# Patient Record
Sex: FEMALE | Race: WHITE | NOT HISPANIC OR LATINO | Employment: OTHER | URBAN - METROPOLITAN AREA
[De-identification: names, ages, dates, MRNs, and addresses within clinical notes are randomized per-mention and may not be internally consistent; named-entity substitution may affect disease eponyms.]

---

## 2017-01-16 DIAGNOSIS — I10 ESSENTIAL HYPERTENSION: ICD-10-CM

## 2017-01-16 RX ORDER — LOSARTAN POTASSIUM 25 MG/1
25 TABLET ORAL DAILY
Qty: 90 TAB | Refills: 0 | Status: SHIPPED | OUTPATIENT
Start: 2017-01-16 | End: 2017-03-08 | Stop reason: SDUPTHER

## 2017-01-29 ENCOUNTER — PATIENT OUTREACH (OUTPATIENT)
Dept: HEALTH INFORMATION MANAGEMENT | Facility: OTHER | Age: 72
End: 2017-01-29

## 2017-02-06 ENCOUNTER — TELEPHONE (OUTPATIENT)
Dept: HEALTH INFORMATION MANAGEMENT | Facility: OTHER | Age: 72
End: 2017-02-06

## 2017-02-06 DIAGNOSIS — Z12.11 SCREENING FOR COLON CANCER: ICD-10-CM

## 2017-02-06 NOTE — TELEPHONE ENCOUNTER
This patient is overdue for health maintenance topics that need orders so she can complete them.     Please review the pended orders in  to sign or make adjustments as appropriate.    Close the encounter when complete.  If declining these orders, please document a reason and route to the Outreach MA pool (p AMB  Outreach).  I will call the patient to cancel the appointment.

## 2017-02-06 NOTE — PROGRESS NOTES
Outcome:SCHEDULED AWV    Attempt # INCOMMING CALL    Annual Wellness Visit Scheduling  Scheduling Status: Scheduled    Care Gap Scheduling (Attempt to Schedule EACH Overdue Care Gap!)  Health Maintenance Due   Topic Date Due   • Annual Wellness Visit  SCHEDULED   • MAMMOGRAM  SCHEDULED   • COLONOSCOPY  ORDER SENT   • BONE DENSITY  SCHEDULED         MyChart Activation:  Already Active/Declined/Sent Activation Code

## 2017-02-08 ENCOUNTER — HOSPITAL ENCOUNTER (OUTPATIENT)
Dept: RADIOLOGY | Facility: MEDICAL CENTER | Age: 72
End: 2017-02-08
Attending: FAMILY MEDICINE
Payer: MEDICARE

## 2017-02-08 DIAGNOSIS — Z12.31 VISIT FOR SCREENING MAMMOGRAM: ICD-10-CM

## 2017-02-08 PROCEDURE — 77063 BREAST TOMOSYNTHESIS BI: CPT

## 2017-02-16 ENCOUNTER — TELEPHONE (OUTPATIENT)
Dept: MEDICAL GROUP | Age: 72
End: 2017-02-16

## 2017-02-16 DIAGNOSIS — R92.8 ABNORMAL MAMMOGRAM OF LEFT BREAST: Primary | ICD-10-CM

## 2017-02-16 NOTE — TELEPHONE ENCOUNTER
Called patient about mammogram-abnormal left breast finding.  Advice will order diagnostic mammogram and ultrasound for left breast.  Patient to call [phone] to schedule if she does not receive a call within the next couple days.

## 2017-02-20 ENCOUNTER — OFFICE VISIT (OUTPATIENT)
Dept: URGENT CARE | Facility: CLINIC | Age: 72
End: 2017-02-20
Payer: MEDICARE

## 2017-02-20 VITALS
HEIGHT: 64 IN | WEIGHT: 145 LBS | SYSTOLIC BLOOD PRESSURE: 114 MMHG | OXYGEN SATURATION: 97 % | HEART RATE: 84 BPM | DIASTOLIC BLOOD PRESSURE: 66 MMHG | TEMPERATURE: 97.9 F | BODY MASS INDEX: 24.75 KG/M2 | RESPIRATION RATE: 16 BRPM

## 2017-02-20 DIAGNOSIS — J39.2 PHARYNGEAL IRRITATION: ICD-10-CM

## 2017-02-20 PROCEDURE — G8482 FLU IMMUNIZE ORDER/ADMIN: HCPCS | Performed by: NURSE PRACTITIONER

## 2017-02-20 PROCEDURE — 4040F PNEUMOC VAC/ADMIN/RCVD: CPT | Performed by: NURSE PRACTITIONER

## 2017-02-20 PROCEDURE — 1101F PT FALLS ASSESS-DOCD LE1/YR: CPT | Mod: 8P | Performed by: NURSE PRACTITIONER

## 2017-02-20 PROCEDURE — 1036F TOBACCO NON-USER: CPT | Performed by: NURSE PRACTITIONER

## 2017-02-20 PROCEDURE — G8420 CALC BMI NORM PARAMETERS: HCPCS | Performed by: NURSE PRACTITIONER

## 2017-02-20 PROCEDURE — 3017F COLORECTAL CA SCREEN DOC REV: CPT | Mod: 1P | Performed by: NURSE PRACTITIONER

## 2017-02-20 PROCEDURE — 99213 OFFICE O/P EST LOW 20 MIN: CPT | Performed by: NURSE PRACTITIONER

## 2017-02-20 PROCEDURE — 3014F SCREEN MAMMO DOC REV: CPT | Performed by: NURSE PRACTITIONER

## 2017-02-20 PROCEDURE — G8432 DEP SCR NOT DOC, RNG: HCPCS | Performed by: NURSE PRACTITIONER

## 2017-02-20 ASSESSMENT — ENCOUNTER SYMPTOMS
CHILLS: 0
VOMITING: 0
NAUSEA: 0
FEVER: 0
NECK PAIN: 0
MYALGIAS: 0

## 2017-02-20 NOTE — PROGRESS NOTES
Subjective:      Katie Monroy is a 72 y.o. female who presents with Swallowed Foreign Body    Past Medical History   Diagnosis Date   • Arthritis    • Asthma    • Cancer (CMS-HCC)      skin   • Prediabetes    • Hypertension    • Irritable bowel syndrome    • Plantar fasciitis of right foot      Social History     Social History   • Marital Status:      Spouse Name: N/A   • Number of Children: N/A   • Years of Education: N/A     Occupational History   • retired-  , Couple      Social History Main Topics   • Smoking status: Never Smoker    • Smokeless tobacco: Never Used   • Alcohol Use: 0.0 oz/week     0 Standard drinks or equivalent per week      Comment: occasional   • Drug Use: No   • Sexual Activity: Not on file     Other Topics Concern   • Not on file     Social History Narrative    [place]ian.     .     2 daughters.          Family History   Problem Relation Age of Onset   • Cancer Mother      leukemia   • Cancer Father      lung   • Heart Disease Mother      arrhythmia   • Diabetes Neg Hx      Allergies: Review of patient's allergies indicates no known allergies.    This patient is a 72-year-old female who presents today with complaint of foreign body sensation to the right side of her throat. She states yesterday she was at a party and ate some fish. She believes a fishbone became lodged in her throat. She has not had any difficulty swallowing or speaking, and she can tolerate food and fluids. No shortness of breath or difficulty breathing.            Swallowed Foreign Body  This is a new problem. The problem occurs constantly. The problem has been unchanged. Pertinent negatives include no chills, fever, myalgias, nausea, neck pain or vomiting. Nothing aggravates the symptoms. She has tried nothing for the symptoms. The treatment provided no relief.       Review of Systems   Constitutional: Negative for fever and chills.   HENT:        FB sensation in the throat   "  Gastrointestinal: Negative for nausea and vomiting.   Musculoskeletal: Negative for myalgias and neck pain.       All other systems reviewed and are negative      Objective:     /66 mmHg  Pulse 84  Temp(Src) 36.6 °C (97.9 °F)  Resp 16  Ht 1.626 m (5' 4\")  Wt 65.772 kg (145 lb)  BMI 24.88 kg/m2  SpO2 97%     Physical Exam   Constitutional: She is oriented to person, place, and time. She appears well-developed and well-nourished. No distress.   HENT:   Mouth/Throat: Oropharynx is clear and moist. No oropharyngeal exudate.   Oropharynx is clear. There is no obvious FB noted.    Neck: Normal range of motion. Neck supple.   Cardiovascular: Normal rate and regular rhythm.    Neurological: She is alert and oriented to person, place, and time.   Skin: Skin is warm and dry. She is not diaphoretic.   Vitals reviewed.    Advised patient that after lengthy and careful inspection, I do not see an obvious foreign body. Patient still appears to be anxious regarding this. I recommended warm salt water gargles and Chloraseptic spray or lozenges for irritation. I advised her that she may have a scratch on the mucosal surface, and then this may take a day or 2 to heal. I advised her that if her symptoms are persistent, but I do recommend follow-up with ENT. Given strict emergency room precautions for any acute inability to swallow food or fluids, any difficulty speaking or breathing.            Assessment/Plan:   Pharyngeal irritation   -ER precautions as discussed above     -chloraseptic spray or lozenges   -warm saltwater gargles as needed.   There are no diagnoses linked to this encounter.      "

## 2017-02-20 NOTE — MR AVS SNAPSHOT
"        Katie Munozding   2017 9:00 AM   Office Visit   MRN: [id number]    Department:  [facility]   Dept Phone:  [phone]    Description:  Female : 1945   Provider:  Cathey J Hamman, A.P.N.           Reason for Visit     Swallowed Foreign Body possible fish bone, felt it yesterday and this morning can still feel it       Allergies as of 2017     No Known Allergies      You were diagnosed with     Pharyngeal irritation   [244026]         Vital Signs     Blood Pressure Pulse Temperature Respirations Height Weight    114/66 mmHg 84 36.6 °C (97.9 °F) 16 1.626 m (5' 4\") 65.772 kg (145 lb)    Body Mass Index Oxygen Saturation Smoking Status             24.88 kg/m2 97% Never Smoker          Basic Information     Date Of Birth Sex Race Ethnicity Preferred Language    1945 Female White Non- English      Your appointments     Mar 03, 2017  7:45 AM   US BREAST with [address] BREAST [facility] ([address])    [address]  [facility] [id number] [phone]           No deodorant, powder, perfume or lotion under the arm or breast area.            Mar 08, 2017 10:40 AM   ANNUAL WELLNESS with Barbi Guzman M.D., [facility]    [address] [address] [phone]              Problem List              ICD-10-CM Priority Class Noted - Resolved    Asthma, mild persistent J45.30   2016 - Present    Hyperlipidemia E78.5   2016 - Present    Essential hypertension I10   2016 - Present    Skin lesion of face L98.9   2016 - Present    History of skin cancer of unknown type Z80.8   2016 - Present    Arthritis M19.90   2016 - Present    Acute laryngopharyngitis J06.0   2016 - Present      Health Maintenance        Date Due Completion Dates    COLONOSCOPY 2/10/1996 ---    BONE DENSITY 2/10/2011 ---    MAMMOGRAM 2018    IMM DTaP/Tdap/Td Vaccine (2 - " Td) 8/31/2025 8/31/2015            Current Immunizations     13-VALENT PCV PREVNAR 9/3/2015    Influenza Vaccine Adult HD 10/27/2016, 9/14/2015    Pneumococcal polysaccharide vaccine (PPSV-23) 4/14/2011    SHINGLES VACCINE 11/4/2011    Tdap Vaccine 8/31/2015      Below and/or attached are the medications your provider expects you to take. Review all of your home medications and newly ordered medications with your provider and/or pharmacist. Follow medication instructions as directed by your provider and/or pharmacist. Please keep your medication list with you and share with your provider. Update the information when medications are discontinued, doses are changed, or new medications (including over-the-counter products) are added; and carry medication information at all times in the event of emergency situations     Allergies:  No Known Allergies          Medications  Valid as of: February 20, 2017 -  9:32 AM    Generic Name Brand Name Tablet Size Instructions for use    Alum & Mag Hydroxide-Simeth (MAALOX PLUS-BENADRYL-XYLOCAINE) maalox plus-benadryl-xylocaine  Take 5 mL by mouth every 6 hours as needed.        Amoxicillin-Pot Clavulanate (Tab) AUGMENTIN 875-125 MG Take 1 Tab by mouth 2 times a day.        Cholecalciferol (Tab) cholecalciferol 1000 UNIT Take 1,000 Units by mouth every day.        Coenzyme Q10   Take  by mouth.        Cyanocobalamin (Tab) VITAMIN B-12 500 MCG Take 500 mcg by mouth every day.        Losartan Potassium (Tab) COZAAR 25 MG Take 1 Tab by mouth every day.        Multiple Vitamins-Minerals (Tab) THERAGRAN-M  Take 1 Tab by mouth every day.        .                 Medicines prescribed today were sent to:     [facility] [address] [address] [facility]    [address] [facility] [place] [address]    Phone: [phone] Fax: [phone]    Open 24 Hours?: No      Medication refill instructions:       If your prescription bottle indicates you have medication refills left, it is not necessary to  call your provider’s office. Please contact your pharmacy and they will refill your medication.    If your prescription bottle indicates you do not have any refills left, you may request refills at any time through one of the following ways: The online [employer] system (except Urgent Care), by calling your provider’s office, or by asking your pharmacy to contact your provider’s office with a refill request. Medication refills are processed only during regular business hours and may not be available until the next business day. Your provider may request additional information or to have a follow-up visit with you prior to refilling your medication.   *Please Note: Medication refills are assigned a new Rx number when refilled electronically. Your pharmacy may indicate that no refills were authorized even though a new prescription for the same medication is available at the pharmacy. Please request the medicine by name with the pharmacy before contacting your provider for a refill.           [employer] Access Code: Activation code not generated  Current [employer] Status: Active

## 2017-02-22 ENCOUNTER — HOSPITAL ENCOUNTER (OUTPATIENT)
Dept: RADIOLOGY | Facility: MEDICAL CENTER | Age: 72
End: 2017-02-22

## 2017-03-01 ENCOUNTER — TELEPHONE (OUTPATIENT)
Dept: MEDICAL GROUP | Age: 72
End: 2017-03-01

## 2017-03-01 NOTE — TELEPHONE ENCOUNTER
ANNUAL WELLNESS VISIT PRE-VISIT PLANNING     1.  Reviewed last PCP office visit assessment and plan notes: Yes    2.  If any orders were placed last visit do we have Results/Consult Notes?        •  Labs? Yes complete       •  Imaging? Yes complete       •  Referrals? Yes pending for colonoscopy    3.  Patient Care Coordination Note was updated with diagnosis information:  Yes    4.  Patient is due for these Health Maintenance Topics:   Health Maintenance Due   Topic Date Due   • Annual Wellness Visit  1945   • COLONOSCOPY  02/10/1996   • BONE DENSITY  02/10/2011           5.  Immunizations were updated in [facility] using Web[employer]?: Yes       •  Web [employer] Recommendations:  Td, Hep A, Hep B       •  Is patient due for Tdap/Shingles? No.  If yes, was patient alerted of copay? No    6.  Patient has:       •   Diabetes: no       •   COPD: no       •   CHF: no       •   Depression: no    7.  Updated Care Team with [employer] and all specialists?        •   Gait devices, O2, CPAP, etc: N\A        •   Eye professional: yes       •   Other specialists (GYN, cardiology, endo, etc): yes    8.  Is patient in need of any refills prior to office visit? No       •    Separate refill encounter created?: no    9.  Patient was informed to arrive 15 min prior to their scheduled appointment and bring in their medication bottles? yes    10.  Patient was advised: “This is a free wellness visit. The provider will screen for medical conditions to help you stay healthy. If you have other concerns to address you may be asked to discuss these at a separate visit or there may be an additional fee.”  Yes

## 2017-03-03 ENCOUNTER — HOSPITAL ENCOUNTER (OUTPATIENT)
Dept: RADIOLOGY | Facility: MEDICAL CENTER | Age: 72
End: 2017-03-03
Attending: FAMILY MEDICINE
Payer: MEDICARE

## 2017-03-03 ENCOUNTER — TELEPHONE (OUTPATIENT)
Dept: MEDICAL GROUP | Age: 72
End: 2017-03-03

## 2017-03-03 DIAGNOSIS — N63.0 BREAST MASS: ICD-10-CM

## 2017-03-03 DIAGNOSIS — R92.8 ABNORMAL MAMMOGRAM OF LEFT BREAST: ICD-10-CM

## 2017-03-03 PROCEDURE — 19083 BX BREAST 1ST LESION US IMAG: CPT

## 2017-03-03 PROCEDURE — 88305 TISSUE EXAM BY PATHOLOGIST: CPT

## 2017-03-03 PROCEDURE — 76642 ULTRASOUND BREAST LIMITED: CPT | Mod: LT

## 2017-03-03 PROCEDURE — 88360 TUMOR IMMUNOHISTOCHEM/MANUAL: CPT | Mod: 91

## 2017-03-06 ENCOUNTER — TELEPHONE (OUTPATIENT)
Dept: RADIOLOGY | Facility: MEDICAL CENTER | Age: 72
End: 2017-03-06

## 2017-03-06 DIAGNOSIS — C50.412 MALIGNANT NEOPLASM OF UPPER-OUTER QUADRANT OF LEFT FEMALE BREAST (HCC): ICD-10-CM

## 2017-03-06 NOTE — TELEPHONE ENCOUNTER
Spoke to pt let her know that I spoke w/Megan's office and requested that a urgent referral be placed by another provider since Megan isn't in the office till Wed/also let her know RN Miguel Angel will be calling to follow up/chaim

## 2017-03-06 NOTE — TELEPHONE ENCOUNTER
Spoke to Megan's office requested an urgent referral to a breast surgeon be started by another provider since Megan is out of office/chaim

## 2017-03-07 ENCOUNTER — TELEPHONE (OUTPATIENT)
Dept: MEDICAL GROUP | Age: 72
End: 2017-03-07

## 2017-03-07 DIAGNOSIS — C50.912 INVASIVE DUCTAL CARCINOMA OF BREAST, LEFT (HCC): ICD-10-CM

## 2017-03-07 NOTE — TELEPHONE ENCOUNTER
Spoke with patient- she spoke with radiology, received biopsy results already yesterday. She is awaiting call for referral. Appears referral to cancer care coordinator done, but unclear.   New urgent referral done to Dr. Mcleod.   Please call pt when with appt scheduled.

## 2017-03-07 NOTE — TELEPHONE ENCOUNTER
1. Caller Name: Katie Monroy                                         Call Back Number: [phone] (home)       Patient approves a detailed voicemail message: yes    Patient called and was concerned about her results of biopsy of breast. She does have appointment on 3/8/17.   Phone Number Called: [phone] (home)     Message:Spoke with patient and let them know that results were not reviewed by provider. Patient is very upset and would like a call back.    Left Message for patient to call back: yes

## 2017-03-08 ENCOUNTER — OFFICE VISIT (OUTPATIENT)
Dept: MEDICAL GROUP | Age: 72
End: 2017-03-08
Payer: MEDICARE

## 2017-03-08 VITALS
OXYGEN SATURATION: 95 % | BODY MASS INDEX: 26.46 KG/M2 | HEIGHT: 64 IN | TEMPERATURE: 98.2 F | DIASTOLIC BLOOD PRESSURE: 78 MMHG | SYSTOLIC BLOOD PRESSURE: 102 MMHG | WEIGHT: 155 LBS | HEART RATE: 82 BPM

## 2017-03-08 DIAGNOSIS — Z00.00 MEDICARE ANNUAL WELLNESS VISIT, INITIAL: ICD-10-CM

## 2017-03-08 DIAGNOSIS — R10.9 RT FLANK PAIN: ICD-10-CM

## 2017-03-08 DIAGNOSIS — R79.89 ELEVATED TSH: ICD-10-CM

## 2017-03-08 DIAGNOSIS — Z78.0 POSTMENOPAUSAL: ICD-10-CM

## 2017-03-08 DIAGNOSIS — C50.412 MALIGNANT NEOPLASM OF UPPER-OUTER QUADRANT OF LEFT FEMALE BREAST (HCC): ICD-10-CM

## 2017-03-08 DIAGNOSIS — M54.9 UPPER BACK PAIN ON LEFT SIDE: ICD-10-CM

## 2017-03-08 DIAGNOSIS — D05.12 INTRADUCTAL CARCINOMA OF LEFT BREAST: ICD-10-CM

## 2017-03-08 DIAGNOSIS — E78.00 PURE HYPERCHOLESTEROLEMIA: ICD-10-CM

## 2017-03-08 DIAGNOSIS — Z12.11 ENCOUNTER FOR FIT (FECAL IMMUNOCHEMICAL TEST) SCREENING: ICD-10-CM

## 2017-03-08 DIAGNOSIS — Z13.820 SCREENING FOR OSTEOPOROSIS: ICD-10-CM

## 2017-03-08 DIAGNOSIS — I10 ESSENTIAL HYPERTENSION: ICD-10-CM

## 2017-03-08 DIAGNOSIS — M19.90 ARTHRITIS: ICD-10-CM

## 2017-03-08 DIAGNOSIS — J45.20 MILD INTERMITTENT ASTHMA WITHOUT COMPLICATION: ICD-10-CM

## 2017-03-08 DIAGNOSIS — H61.92 LESION OF SKIN OF LEFT EAR: ICD-10-CM

## 2017-03-08 LAB
APPEARANCE UR: CLEAR
BILIRUB UR STRIP-MCNC: NEGATIVE MG/DL
COLOR UR AUTO: NORMAL
GLUCOSE UR STRIP.AUTO-MCNC: NEGATIVE MG/DL
KETONES UR STRIP.AUTO-MCNC: NEGATIVE MG/DL
LEUKOCYTE ESTERASE UR QL STRIP.AUTO: NEGATIVE
NITRITE UR QL STRIP.AUTO: NEGATIVE
PH UR STRIP.AUTO: 5 [PH] (ref 5–8)
PROT UR QL STRIP: NEGATIVE MG/DL
RBC UR QL AUTO: NEGATIVE
SP GR UR STRIP.AUTO: 1.02
UROBILINOGEN UR STRIP-MCNC: NEGATIVE MG/DL

## 2017-03-08 PROCEDURE — G0438 PPPS, INITIAL VISIT: HCPCS | Performed by: FAMILY MEDICINE

## 2017-03-08 PROCEDURE — 81002 URINALYSIS NONAUTO W/O SCOPE: CPT | Performed by: FAMILY MEDICINE

## 2017-03-08 RX ORDER — LOSARTAN POTASSIUM 25 MG/1
25 TABLET ORAL DAILY
Qty: 90 TAB | Refills: 3 | Status: SHIPPED | OUTPATIENT
Start: 2017-03-08 | End: 2018-02-05 | Stop reason: SDUPTHER

## 2017-03-08 ASSESSMENT — PAIN SCALES - GENERAL: PAINLEVEL: 3=SLIGHT PAIN

## 2017-03-08 ASSESSMENT — PATIENT HEALTH QUESTIONNAIRE - PHQ9: CLINICAL INTERPRETATION OF PHQ2 SCORE: 0

## 2017-03-08 NOTE — TELEPHONE ENCOUNTER
Phone Number Called: [phone] (home)     Message: called pt notified that the referral information has been sent to Dr. Rebecca Mcleod, Dr. Mcleod will review information and contact patient based on severity of the case.     Left Message for patient to call back: no

## 2017-03-08 NOTE — MR AVS SNAPSHOT
"Katie Monroy   3/8/2017 10:40 AM   Office Visit   MRN: [id number]    Department:  [facility]   Dept Phone:  [phone]    Description:  Female : 1945   Provider:  Barbi Guzman M.D.; [facility]            Reason for Visit     Annual Wellness Visit           Allergies as of 3/8/2017     No Known Allergies      You were diagnosed with     Medicare annual wellness visit, initial   [078554]       Essential hypertension   [1659515]       Pure hypercholesterolemia   [272.0.ICD-9-CM]       Mild intermittent asthma without complication   [715879]       Arthritis   [109458]       Upper back pain on left side   [711018]       Rt flank pain   [981002]       Elevated TSH   [821317]       Malignant neoplasm of upper-outer quadrant of left female breast (CMS-HCC)   [857984]       Intraductal carcinoma of left breast   [9335695]       Lesion of skin of left ear   [2873774]       Postmenopausal   [573376]       Screening for osteoporosis   [377573]       Encounter for FIT (fecal immunochemical test) screening   [1712378]         Vital Signs     Blood Pressure Pulse Temperature Height Weight Body Mass Index    102/78 mmHg 82 36.8 °C (98.2 °F) 1.623 m (5' 3.9\") 70.308 kg (155 lb) 26.69 kg/m2    Oxygen Saturation Smoking Status                95% Never Smoker           Basic Information     Date Of Birth Sex Race Ethnicity Preferred Language    1945 Female White Non- English      Problem List              ICD-10-CM Priority Class Noted - Resolved    Asthma, mild persistent J45.30   2016 - Present    Hyperlipidemia E78.5   2016 - Present    Essential hypertension I10   2016 - Present    Skin lesion of face L98.9   2016 - Present    History of skin cancer of unknown type Z80.8   2016 - Present    Arthritis M19.90   2016 - Present    Malignant neoplasm of upper-outer quadrant of left female breast (CMS-HCC) C50.412   3/6/2017 - Present    Intraductal carcinoma " of left breast D05.12   3/8/2017 - Present      Health Maintenance        Date Due Completion Dates    COLONOSCOPY 2/10/1996 ---    BONE DENSITY 2/10/2011 ---    MAMMOGRAM 2/22/2018 2/22/2017, 2/22/2017, 2/8/2017    IMM DTaP/Tdap/Td Vaccine (2 - Td) 8/31/2025 8/31/2015            Current Immunizations     13-VALENT PCV PREVNAR 9/3/2015    Influenza Vaccine Adult HD 10/27/2016, 9/14/2015    Pneumococcal polysaccharide vaccine (PPSV-23) 4/14/2011    SHINGLES VACCINE 11/4/2011    Tdap Vaccine 8/31/2015      Below and/or attached are the medications your provider expects you to take. Review all of your home medications and newly ordered medications with your provider and/or pharmacist. Follow medication instructions as directed by your provider and/or pharmacist. Please keep your medication list with you and share with your provider. Update the information when medications are discontinued, doses are changed, or new medications (including over-the-counter products) are added; and carry medication information at all times in the event of emergency situations     Allergies:  No Known Allergies          Medications  Valid as of: March 08, 2017 - 12:54 PM    Generic Name Brand Name Tablet Size Instructions for use    Cholecalciferol (Tab) cholecalciferol 1000 UNIT Take 1,000 Units by mouth every day.        Coenzyme Q10   Take  by mouth.        Cyanocobalamin (Tab) VITAMIN B-12 500 MCG Take 500 mcg by mouth every day.        Losartan Potassium (Tab) COZAAR 25 MG Take 1 Tab by mouth every day.        Multiple Vitamins-Minerals (Tab) THERAGRAN-M  Take 1 Tab by mouth every day.        .                 Medicines prescribed today were sent to:     [facility] [address] [facility] - [address] [facility]    [address] [facility] [place] [address]    Phone: [phone] Fax: [phone]    Open 24 Hours?: No      Medication refill instructions:       If your prescription bottle indicates you have medication refills left, it is not necessary to  call your provider’s office. Please contact your pharmacy and they will refill your medication.    If your prescription bottle indicates you do not have any refills left, you may request refills at any time through one of the following ways: The online [employer] system (except Urgent Care), by calling your provider’s office, or by asking your pharmacy to contact your provider’s office with a refill request. Medication refills are processed only during regular business hours and may not be available until the next business day. Your provider may request additional information or to have a follow-up visit with you prior to refilling your medication.   *Please Note: Medication refills are assigned a new Rx number when refilled electronically. Your pharmacy may indicate that no refills were authorized even though a new prescription for the same medication is available at the pharmacy. Please request the medicine by name with the pharmacy before contacting your provider for a refill.        Your To Do List     Future Labs/Procedures Complete By Expires    CBC WITH DIFFERENTIAL  As directed 3/8/2018    COMP METABOLIC PANEL  As directed 9/5/2017    DS-BONE DENSITY STUDY (DEXA)  As directed 9/7/2017    FREE THYROXINE  As directed 3/8/2018    LIPID PROFILE  As directed 9/5/2017    OCCULT BLOOD FECES IMMUNOASSAY  As directed 3/8/2018    TRIIDOTHYRONINE  As directed 3/8/2018    TSH  As directed 3/8/2018    US-ABDOMEN COMPLETE SURVEY  As directed 3/8/2018      Referral     A referral request has been sent to our patient care coordination department. Please allow 3-5 business days for us to process this request and contact you either by phone or mail. If you do not hear from us by the 5th business day, please call us at [phone].           [employer] Access Code: Activation code not generated  Current [employer] Status: Active

## 2017-03-08 NOTE — PROGRESS NOTES
Chief Complaint   Patient presents with   • Annual Wellness Visit       HPI:  Katie is a 72 y.o. female here for Medicare Annual Wellness Visit  Hypertension- losartan 25 mg daily. Has tolerated medication well.     Hyperlipidemia- not on medication therapy. Fiber and not much red meat.     Asthma-  Inhaler as needed, currently without inhalers. Has dulera- as needed throughout the year.     Arthritis-uses ibuprofen as needed, a few times a week.     She had noticed some pain on left side of her shoulder blade area- vicks vapor rub helped. No further with symptoms currently.     Mild cold symptoms past few days but has been getting better.Sore throat, rhinorrhea better now. Had slight chills. A couple days ago.     Right side kidney area- seems to bother her, when she gets up, does not bother. Last couple months. Has a lipoma on left side back.   6-7 month right side flank pain- would like urine checked. Intermittent. No blood in urine or dysuria.     Elevated TSH.     Recent diagnosis of left side breast cancer- this week. Appointment with Dr. Mcleod on Monday scheduled. She feels the appointment may be too far out.   Hx of hormone therapy for 15 years. Still has occasional hot flashes.     Left ear lesion- noticed recently. Has seen dermatology in past.       Patient Active Problem List    Diagnosis Date Noted   • Intraductal carcinoma of left breast 03/08/2017   • Malignant neoplasm of upper-outer quadrant of left female breast (CMS-HCC) 03/06/2017   • Arthritis 07/08/2016   • Essential hypertension 05/25/2016   • Skin lesion of face 05/25/2016   • History of skin cancer of unknown type 05/25/2016   • Asthma, mild persistent 05/24/2016   • Hyperlipidemia 05/24/2016       Current Outpatient Prescriptions   Medication Sig Dispense Refill   • losartan (COZAAR) 25 MG Tab Take 1 Tab by mouth every day. 90 Tab 3   • Coenzyme Q10 (COQ-10 PO) Take  by mouth.     • therapeutic multivitamin-minerals (THERAGRAN-M) Tab Take 1  Tab by mouth every day.     • cyanocobalamin (VITAMIN B-12) 500 MCG Tab Take 500 mcg by mouth every day.     • vitamin D (CHOLECALCIFEROL) 1000 UNIT Tab Take 1,000 Units by mouth every day.       No current facility-administered medications for this visit.      The patient reports adherence to this regimen   Current supplements as per medication list.   Chronic narcotic pain medicines: no    Allergies: Review of patient's allergies indicates no known allergies.    Current social contact/activities: spending time with      Is patient current with immunizations?  yes       She  reports that she has never smoked. She has never used smokeless tobacco. She reports that she drinks alcohol. She reports that she does not use illicit drugs.  Counseling given: Yes        DPA/Advanced Directive:  Patient does not have an advanced directive.  If not on file, instructed to bring in a copy to scan into her chart. If no advanced directive exists, a packet and workshop information was provided    ROS:    Gait: Uses no assistive device   Ostomy: no   Other tubes: no   Amputations: no   Chronic oxygen use no   Last eye exam 2014   : Denies incontinence.     Depression Screening    Little interest or pleasure in doing things?  0 - not at all  Feeling down, depressed, or hopeless?  0 - not at all  Patient Health Questionnaire Score: 0    If depressive symptoms identified deferred to follow up visit unless specifically addressed in assessment and plan.    Screening for Cognitive Impairment    Three Minute Recall (banana, sunrise, fence)  3/3    Draw clock face with all 12 numbers set to the hand to show 10 minutes past 11 o'clock  1 5/5  Cognitive concerns identified deferred for follow up unless specifically addressed in assessment and plan.    Fall Risk Assessment    Has the patient had two or more falls in the last year or any fall with injury in the last year?  No    Safety Assessment    Throw rugs on floor.  Yes  Handrails  on all stairs.  Yes  Good lighting in all hallways.  Yes  Difficulty hearing.  No  Patient counseled about all safety risks that were identified.    Functional Assessment ADLs    Are there any barriers preventing you from cooking for yourself or meeting nutritional needs?  No.    Are there any barriers preventing you from driving safely or obtaining transportation?  No.    Are there any barriers preventing you from using a telephone or calling for help?  No.    Are there any barriers preventing you from shopping?  No.    Are there any barriers preventing you from taking care of your own finances?  No.    Are there any barriers preventing you from managing your medications?  No.    Are currently engaging any exercise or physical activity?  Yes.  Goes to gym 2-3 times    Health Maintenance Summary                Annual Wellness Visit Overdue 1945     COLONOSCOPY Overdue 2/10/1996     BONE DENSITY Overdue 2/10/2011     MAMMOGRAM Next Due 2/22/2018      Done 2/22/2017 [id number] FILM MAMMOGRAPHY     Patient has more history with this topic...    IMM DTaP/Tdap/Td Vaccine Next Due 8/31/2025      Done 8/31/2015 Imm Admin: Tdap Vaccine          Patient Care Team:  Barbi Guzman M.D. as PCP - General (Family Medicine)    Social History   Substance Use Topics   • Smoking status: Never Smoker    • Smokeless tobacco: Never Used   • Alcohol Use: 0.0 oz/week     0 Standard drinks or equivalent per week      Comment: occasional     Family History   Problem Relation Age of Onset   • Cancer Mother      leukemia   • Cancer Father      lung   • Heart Disease Mother      arrhythmia   • Diabetes Neg Hx      She  has a past medical history of Arthritis; Asthma; Prediabetes; Hypertension; Irritable bowel syndrome; Plantar fasciitis of right foot; Cancer (CMS-HCC); Breast cancer (CMS-HCC); and Bunion.   Past Surgical History   Procedure Laterality Date   • Other       right ovary removed       Exam:     Blood pressure 102/78,  "pulse 82, temperature 36.8 °C (98.2 °F), height 1.623 m (5' 3.9\"), weight 70.308 kg (155 lb), SpO2 95 %. Body mass index is 26.69 kg/(m^2).  Hearing good.    Dentition good  Alert, oriented in no acute distress.  Eye contact is good, speech goal directed, affect calm  Constitutional: She appears well-developed and well-nourished. She appears not diaphoretic. No distress.   HENT: Right Ear: External ear normal. Left Ear: External ear normal. Cerumen filled bilaterally. See skin below.   Nose: Nose normal.   Mouth/Throat: Oropharynx is clear and moist. No oropharyngeal exudate.     Eyes: Conjunctivae and extraocular motions are normal. Pupils are equal, round, and reactive to light. No scleral icterus.   Neck: Normal range of motion. Neck supple. No thyromegaly present.   Cardiovascular: Normal rate, regular rhythm, normal heart sounds and intact distal pulses.  Exam reveals no gallop and no friction rub.  No murmur heard. No carotid bruits.   Pulmonary/Chest: Effort normal and breath sounds normal. No respiratory distress. She has no wheezes. She has no rales.   Abdominal: Soft. Bowel sounds are normal. She exhibits no distension and no mass. No tenderness. She has no rebound and no guarding.   Lymphadenopathy:  She has no cervical adenopathy. No CVAT.   Neurological: She is alert. She has normal reflexes. No cranial nerve deficit. She exhibits normal muscle tone.   Skin: Skin is warm and dry. No rash noted. She is not diaphoretic. No erythema. Right ear -central external ear region with  ~2-3 mm skin colored papule  Psychiatric: She has a normal mood and affect. Her behavior is normal.   Musculoskeletal: She exhibits no edema.   BREAST EXAM: left breast with ecchymosis, left upper outer quadrant with palpable nodule ~1.5 cm. No nipple discharge or bleeding. No axillary MICHAEL.     Urine dip: negative.     Assessment and Plan. The following treatment and monitoring plan is recommended:      1. Medicare annual wellness " visit, initial   -Discussed healthy diet and routine exercise.      2. Essential hypertension- slightly low bp today. Advised to monitor BP. Otherwise asymptomatic.   losartan (COZAAR) 25 MG Tab    CBC WITH DIFFERENTIAL    COMP METABOLIC PANEL    Initial Wellness Visit - Includes PPPS ()   3. Pure hypercholesterolemia- stable, diet controlled. Monitor. Low cholesterol, high fiber diet.   TSH    FREE THYROXINE    TRIIDOTHYRONINE    LIPID PROFILE    Initial Wellness Visit - Includes PPPS ()   4. Mild intermittent asthma without complication- albuterol as needed. Dulera as needed.   Initial Wellness Visit - Includes PPPS ()   5. Arthritis - stable. Advised to limit ibuprofen use as needed, reviewed possible side effects.  Initial Wellness Visit - Includes PPPS ()   6. Upper back pain on left side- appears musculoskeletal in etiology. Currently symptoms resolved. Monitor. Reviewed possible signs of concern and ER precautions. Follow-up as needed.     7. Rt flank pain-no significant findings on exam today. Obtain ultrasound to assess kidneys and liver.  POCT Urinalysis    US-ABDOMEN COMPLETE SURVEY    Initial Wellness Visit - Includes PPPS ()   8. Elevated TSH-recheck lab work. Asymptomatic at this time.  TSH    FREE THYROXINE    TRIIDOTHYRONINE    Initial Wellness Visit - Includes PPPS ()   9. 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 She does have an appointment to see the surgeon next Monday, March 13. She would like a sooner appointment if possible. Advised we will inquire about any sooner appointments available with a surgeon. Had a long conversation about current findings and future treatment. Provided counseling and support.  Initial Wellness Visit - Includes PPPS ()   10. Intraductal carcinoma of left breast-as above.     11. Lesion of skin of left ear- we'll refer to dermatology to assess for possible basal cell  etiology.  REFERRAL TO DERMATOLOGY    Initial Wellness Visit - Includes PPPS ()   12. Postmenopausal  DS-BONE DENSITY STUDY (DEXA)    Initial Wellness Visit - Includes PPPS ()   13. Screening for osteoporosis  DS-BONE DENSITY STUDY (DEXA)    Initial Wellness Visit - Includes PPPS ()   14. Encounter for FIT (fecal immunochemical test) screening  OCCULT BLOOD FECES IMMUNOASSAY    Initial Wellness Visit - Includes PPPS ()     Services needed: Coordinated care to include -specialist appointment- for recent diagnosis of breast cancer.   Health Care Screening recommendations as per orders if indicated.  Referrals offered: PT/OT/Nutrition counseling/Behavioral Health/Smoking cessation as per orders if indicated.    Discussion today about general wellness and lifestyle habits:    · Prevent falls and reduce trip hazards; Cautioned about securing or removing rugs.  · Have a working fire alarm and carbon monoxide detector;   · Engage in regular physical activity and social activities    Follow-up: after imaging/labs.   Routine annual exam.

## 2017-03-15 DIAGNOSIS — Z01.810 PRE-OPERATIVE CARDIOVASCULAR EXAMINATION: ICD-10-CM

## 2017-03-15 DIAGNOSIS — Z01.812 PRE-OPERATIVE LABORATORY EXAMINATION: ICD-10-CM

## 2017-03-15 LAB
ANION GAP SERPL CALC-SCNC: 6 MMOL/L (ref 0–11.9)
BASOPHILS # BLD AUTO: 0.3 % (ref 0–1.8)
BASOPHILS # BLD: 0.02 K/UL (ref 0–0.12)
BUN SERPL-MCNC: 15 MG/DL (ref 8–22)
CALCIUM SERPL-MCNC: 9.5 MG/DL (ref 8.5–10.5)
CHLORIDE SERPL-SCNC: 102 MMOL/L (ref 96–112)
CO2 SERPL-SCNC: 26 MMOL/L (ref 20–33)
CREAT SERPL-MCNC: 0.52 MG/DL (ref 0.5–1.4)
EKG IMPRESSION: NORMAL
EOSINOPHIL # BLD AUTO: 0.14 K/UL (ref 0–0.51)
EOSINOPHIL NFR BLD: 2 % (ref 0–6.9)
ERYTHROCYTE [DISTWIDTH] IN BLOOD BY AUTOMATED COUNT: 42.1 FL (ref 35.9–50)
GFR SERPL CREATININE-BSD FRML MDRD: >60 ML/MIN/1.73 M 2
GLUCOSE SERPL-MCNC: 93 MG/DL (ref 65–99)
HCT VFR BLD AUTO: 46.1 % (ref 37–47)
HGB BLD-MCNC: 14.9 G/DL (ref 12–16)
IMM GRANULOCYTES # BLD AUTO: 0.04 K/UL (ref 0–0.11)
IMM GRANULOCYTES NFR BLD AUTO: 0.6 % (ref 0–0.9)
LYMPHOCYTES # BLD AUTO: 2.23 K/UL (ref 1–4.8)
LYMPHOCYTES NFR BLD: 31.1 % (ref 22–41)
MCH RBC QN AUTO: 29.1 PG (ref 27–33)
MCHC RBC AUTO-ENTMCNC: 32.3 G/DL (ref 33.6–35)
MCV RBC AUTO: 90 FL (ref 81.4–97.8)
MONOCYTES # BLD AUTO: 0.48 K/UL (ref 0–0.85)
MONOCYTES NFR BLD AUTO: 6.7 % (ref 0–13.4)
NEUTROPHILS # BLD AUTO: 4.26 K/UL (ref 2–7.15)
NEUTROPHILS NFR BLD: 59.3 % (ref 44–72)
NRBC # BLD AUTO: 0 K/UL
NRBC BLD AUTO-RTO: 0 /100 WBC
PLATELET # BLD AUTO: 230 K/UL (ref 164–446)
PMV BLD AUTO: 9.5 FL (ref 9–12.9)
POTASSIUM SERPL-SCNC: 4.3 MMOL/L (ref 3.6–5.5)
RBC # BLD AUTO: 5.12 M/UL (ref 4.2–5.4)
SODIUM SERPL-SCNC: 134 MMOL/L (ref 135–145)
WBC # BLD AUTO: 7.2 K/UL (ref 4.8–10.8)

## 2017-03-15 PROCEDURE — 80048 BASIC METABOLIC PNL TOTAL CA: CPT

## 2017-03-15 PROCEDURE — 36415 COLL VENOUS BLD VENIPUNCTURE: CPT

## 2017-03-15 PROCEDURE — 85025 COMPLETE CBC W/AUTO DIFF WBC: CPT

## 2017-03-17 ENCOUNTER — APPOINTMENT (OUTPATIENT)
Dept: RADIOLOGY | Facility: MEDICAL CENTER | Age: 72
End: 2017-03-17
Attending: SURGERY
Payer: MEDICARE

## 2017-03-17 ENCOUNTER — HOSPITAL ENCOUNTER (OUTPATIENT)
Facility: MEDICAL CENTER | Age: 72
End: 2017-03-17
Attending: SURGERY | Admitting: RADIOLOGY
Payer: MEDICARE

## 2017-03-17 VITALS
SYSTOLIC BLOOD PRESSURE: 134 MMHG | BODY MASS INDEX: 26.42 KG/M2 | RESPIRATION RATE: 22 BRPM | OXYGEN SATURATION: 95 % | TEMPERATURE: 97.4 F | HEART RATE: 71 BPM | HEIGHT: 64 IN | WEIGHT: 154.76 LBS | DIASTOLIC BLOOD PRESSURE: 66 MMHG

## 2017-03-17 DIAGNOSIS — C50.412 MALIGNANT NEOPLASM OF UPPER-OUTER QUADRANT OF LEFT FEMALE BREAST (HCC): ICD-10-CM

## 2017-03-17 PROBLEM — C50.419 MALIGNANT NEOPLASM OF UPPER-OUTER QUADRANT OF FEMALE BREAST (HCC): Status: ACTIVE | Noted: 2017-03-17

## 2017-03-17 PROCEDURE — 110382 HCHG SHELL REV 271: Performed by: SURGERY

## 2017-03-17 PROCEDURE — 160047 HCHG PACU  - EA ADDL 30 MINS PHASE II: Performed by: SURGERY

## 2017-03-17 PROCEDURE — 160041 HCHG SURGERY MINUTES - EA ADDL 1 MIN LEVEL 4: Performed by: SURGERY

## 2017-03-17 PROCEDURE — 88331 PATH CONSLTJ SURG 1 BLK 1SPC: CPT

## 2017-03-17 PROCEDURE — 500122 HCHG BOVIE, BLADE: Performed by: SURGERY

## 2017-03-17 PROCEDURE — 502240 HCHG MISC OR SUPPLY RC 0272: Performed by: SURGERY

## 2017-03-17 PROCEDURE — 500888 HCHG PACK, MINOR BASIN: Performed by: SURGERY

## 2017-03-17 PROCEDURE — 501838 HCHG SUTURE GENERAL: Performed by: SURGERY

## 2017-03-17 PROCEDURE — 160048 HCHG OR STATISTICAL LEVEL 1-5: Performed by: SURGERY

## 2017-03-17 PROCEDURE — A9270 NON-COVERED ITEM OR SERVICE: HCPCS

## 2017-03-17 PROCEDURE — 160046 HCHG PACU - 1ST 60 MINS PHASE II: Performed by: SURGERY

## 2017-03-17 PROCEDURE — 76098 X-RAY EXAM SURGICAL SPECIMEN: CPT | Mod: LT

## 2017-03-17 PROCEDURE — 700101 HCHG RX REV CODE 250

## 2017-03-17 PROCEDURE — 700111 HCHG RX REV CODE 636 W/ 250 OVERRIDE (IP)

## 2017-03-17 PROCEDURE — 88307 TISSUE EXAM BY PATHOLOGIST: CPT | Mod: 59

## 2017-03-17 PROCEDURE — 110371 HCHG SHELL REV 272: Performed by: SURGERY

## 2017-03-17 PROCEDURE — A4606 OXYGEN PROBE USED W OXIMETER: HCPCS | Performed by: SURGERY

## 2017-03-17 PROCEDURE — 38792 RA TRACER ID OF SENTINL NODE: CPT | Mod: LT

## 2017-03-17 PROCEDURE — 160035 HCHG PACU - 1ST 60 MINS PHASE I: Performed by: SURGERY

## 2017-03-17 PROCEDURE — 500700 HCHG HEMOCLIP, SMALL (RED): Performed by: SURGERY

## 2017-03-17 PROCEDURE — 160025 RECOVERY II MINUTES (STATS): Performed by: SURGERY

## 2017-03-17 PROCEDURE — 500887 HCHG PACK, MAJOR BASIN: Performed by: SURGERY

## 2017-03-17 PROCEDURE — 700102 HCHG RX REV CODE 250 W/ 637 OVERRIDE(OP)

## 2017-03-17 PROCEDURE — 501445 HCHG STAPLER, SKIN DISP: Performed by: SURGERY

## 2017-03-17 PROCEDURE — 160009 HCHG ANES TIME/MIN: Performed by: SURGERY

## 2017-03-17 PROCEDURE — 19285 PERQ DEV BREAST 1ST US IMAG: CPT | Mod: LT

## 2017-03-17 PROCEDURE — 160029 HCHG SURGERY MINUTES - 1ST 30 MINS LEVEL 4: Performed by: SURGERY

## 2017-03-17 PROCEDURE — A6402 STERILE GAUZE <= 16 SQ IN: HCPCS | Performed by: SURGERY

## 2017-03-17 PROCEDURE — 160002 HCHG RECOVERY MINUTES (STAT): Performed by: SURGERY

## 2017-03-17 RX ORDER — MIDAZOLAM HYDROCHLORIDE 1 MG/ML
INJECTION INTRAMUSCULAR; INTRAVENOUS
Status: DISPENSED
Start: 2017-03-17 | End: 2017-03-17

## 2017-03-17 RX ORDER — MIDAZOLAM HYDROCHLORIDE 1 MG/ML
INJECTION INTRAMUSCULAR; INTRAVENOUS
Status: DISCONTINUED
Start: 2017-03-17 | End: 2017-03-17 | Stop reason: HOSPADM

## 2017-03-17 RX ORDER — OXYCODONE HYDROCHLORIDE AND ACETAMINOPHEN 5; 325 MG/1; MG/1
TABLET ORAL
Status: COMPLETED
Start: 2017-03-17 | End: 2017-03-17

## 2017-03-17 RX ORDER — LIDOCAINE HYDROCHLORIDE 10 MG/ML
INJECTION, SOLUTION INFILTRATION; PERINEURAL
Status: COMPLETED
Start: 2017-03-17 | End: 2017-03-17

## 2017-03-17 RX ORDER — IBUPROFEN 200 MG
400 TABLET ORAL EVERY 8 HOURS PRN
COMMUNITY
End: 2019-05-15

## 2017-03-17 RX ORDER — SODIUM CHLORIDE, SODIUM LACTATE, POTASSIUM CHLORIDE, CALCIUM CHLORIDE 600; 310; 30; 20 MG/100ML; MG/100ML; MG/100ML; MG/100ML
1000 INJECTION, SOLUTION INTRAVENOUS
Status: COMPLETED | OUTPATIENT
Start: 2017-03-17 | End: 2017-03-17

## 2017-03-17 RX ADMIN — OXYCODONE AND ACETAMINOPHEN 1 TABLET: 5; 325 TABLET ORAL at 12:45

## 2017-03-17 RX ADMIN — FENTANYL CITRATE 25 MCG: 50 INJECTION, SOLUTION INTRAMUSCULAR; INTRAVENOUS at 13:30

## 2017-03-17 RX ADMIN — SODIUM CHLORIDE, SODIUM LACTATE, POTASSIUM CHLORIDE, CALCIUM CHLORIDE 1000 ML: 600; 310; 30; 20 INJECTION, SOLUTION INTRAVENOUS at 06:55

## 2017-03-17 RX ADMIN — FENTANYL CITRATE 25 MCG: 50 INJECTION, SOLUTION INTRAMUSCULAR; INTRAVENOUS at 12:45

## 2017-03-17 RX ADMIN — LIDOCAINE HYDROCHLORIDE: 10 INJECTION, SOLUTION INFILTRATION; PERINEURAL at 06:30

## 2017-03-17 ASSESSMENT — PAIN SCALES - GENERAL
PAINLEVEL_OUTOF10: 0
PAINLEVEL_OUTOF10: 3
PAINLEVEL_OUTOF10: 0
PAINLEVEL_OUTOF10: 0
PAINLEVEL_OUTOF10: 3
PAINLEVEL_OUTOF10: 8
PAINLEVEL_OUTOF10: 3

## 2017-03-17 NOTE — DISCHARGE INSTRUCTIONS
ACTIVITY: Rest and take it easy for the first 24 hours.  A responsible adult is recommended to remain with you during that time.  It is normal to feel sleepy.  We encourage you to not do anything that requires balance, judgment or coordination.    MILD FLU-LIKE SYMPTOMS ARE NORMAL. YOU MAY EXPERIENCE GENERALIZED MUSCLE ACHES, THROAT IRRITATION, HEADACHE AND/OR SOME NAUSEA.    FOR 24 HOURS DO NOT:  Drive, operate machinery or run household appliances.  Drink beer or alcoholic beverages.   Make important decisions or sign legal documents.    SPECIAL INSTRUCTIONS:Discharge Instructions for Lumpectomy and [place] Lymph Node Biospy:    On the day of surgery we will be removing the lump of your breast cancer (lumpectomy) and through a separate incision under your arm we will be taking out the 2-3 lymph nodes that drain your breast (sentinel lymph node dissection).    Wound Care  1. You will have 2 incisions: 1) on your breast (lumpectomy) and 2) under your arm (sentinel lymph node biopsy).  2. All of your stitches are buried under the skin and dissolveable. There are no sutures to remove. Your dressing is waterproog.  3. You can shower the day after your surgery and get your wound wet (it will be sealed by the waterproof dressings)  4. You may have some bruising after surgery. This is normal.  5. There may be a small amount of drainage from your wounds, this is usually normal and nothing to worry about. Place a dry gauze or bandage (available at any drug store) on the wound to absorb any drainage.  6. You can remove the dressing 2 days after surgery.    For Pain  1. Wear your bra as much as possible including to bed. The less your breast moves the less it will hurt.  2. You may take Tylenol or Advil every 4-6 hours as directed on the bottle.  3. You will be given a prescription for more severe pain. You can use it as needed.    Work  1. If you would like, you may return to work the day after your biopsy.  2. If you  need a work excuse for the day of surgery or for any days thereafter, please let us know.    When to call the doctor  1. If you have severe, uncontrolled pain at the biopsy site.  2. If you run at fever of 100.5°F or higher within a few days of the biopsy.  3. If you have a large amount of drainage that is soaking the bandage more than once a day.  4. If the area around your wound becomes red/inflamed.  5. Make sure you schedule and attend all follow-up visits with your doctor. You should have a follow up appointment in about a week after surgery.    If you have any additional questions, please do not hesitate to call the office and speak to either myself or the physician on call.    Office address:  [address] [place] [address]      Katy Gastelum MD  [facility]  [phone]    DIET: To avoid nausea, slowly advance diet as tolerated, avoiding spicy or greasy foods for the first day.  Add more substantial food to your diet according to your physician's instructions.  Babies can be fed formula or breast milk as soon as they are hungry.  INCREASE FLUIDS AND FIBER TO AVOID CONSTIPATION.    SURGICAL DRESSING/BATHING: MAY SHOWER TOMORROW    FOLLOW-UP APPOINTMENT:  A follow-up appointment should be arranged with your doctor in 1 WEEK; call to schedule.    You should CALL YOUR PHYSICIAN if you develop:  Fever greater than 101 degrees F.  Pain not relieved by medication, or persistent nausea or vomiting.  Excessive bleeding (blood soaking through dressing) or unexpected drainage from the wound.  Extreme redness or swelling around the incision site, drainage of pus or foul smelling drainage.  Inability to urinate or empty your bladder within 8 hours.  Problems with breathing or chest pain.    You should call 911 if you develop problems with breathing or chest pain.  If you are unable to contact your doctor or surgical center, you should go to the nearest emergency room or urgent care center.  Physician's  telephone #: DR AARON [phone]    If any questions arise, call your doctor.  If your doctor is not available, please feel free to call the Surgical Center at [phone].  The Center is open Monday through Friday from 7AM to 7PM.  You can also call the HEALTH HOTLINE open 24 hours/day, 7 days/week and speak to a nurse at [phone], or toll free at [phone].    A registered nurse may call you a few days after your surgery to see how you are doing after your procedure.    MEDICATIONS: Resume taking daily medication.  Take prescribed pain medication with food.  If no medication is prescribed, you may take non-aspirin pain medication if needed.  PAIN MEDICATION CAN BE VERY CONSTIPATING.  Take a stool softener or laxative such as senokot, pericolace, or milk of magnesia if needed.    Prescription given for NORCO  Last pain medication given at 1245    If your physician has prescribed pain medication that includes Acetaminophen (Tylenol), do not take additional Acetaminophen (Tylenol) while taking the prescribed medication.    Depression / Suicide Risk    As you are discharged from this [facility] facility, it is important to learn how to keep safe from harming yourself.    Recognize the warning signs:  · Abrupt changes in personality, positive or negative- including increase in energy   · Giving away possessions  · Change in eating patterns- significant weight changes-  positive or negative  · Change in sleeping patterns- unable to sleep or sleeping all the time   · Unwillingness or inability to communicate  · Depression  · Unusual sadness, discouragement and loneliness  · Talk of wanting to die  · Neglect of personal appearance   · Rebelliousness- reckless behavior  · Withdrawal from people/activities they love  · Confusion- inability to concentrate     If you or a loved one observes any of these behaviors or has concerns about self-harm, here's what you can do:  · Talk about it- your feelings and  reasons for harming yourself  · Remove any means that you might use to hurt yourself (examples: pills, rope, extension cords, firearm)  · Get professional help from the community (Mental Health, Substance Abuse, psychological counseling)  · Do not be alone:Call your Safe Contact- someone whom you trust who will be there for you.  · Call your local CRISIS HOTLINE 938-0644 or 042-567-6429  · Call your local Children's Mobile Crisis Response Team [place] (848) 891-5923 or www.Programeter  · Call the toll free National Suicide Prevention Hotlines   · National Suicide Prevention Lifeline 984-132-KOAL (7135)  · National Hope Line Network 800-SUICIDE (902-8122)

## 2017-03-17 NOTE — PROCEDURES
EXAMINATION:    HISTORY/REASON FOR EXAM:    Left upper outer quadrant mass    TECHNIQUE/EXAM DESCRIPTION:   Ultrasound wire localization with subsequent mammogram.    COMPARISON: 4/8/2010, 4/6/2010, 5/11/2010    Attending:  Dr. Mg performed the entire procedure.    Informed consent was confirmed.  There was a time out.  Comparisons were reviewed.    There is a mass in the left breast which was targeted with this localization.    After preparation in a sterile fashion, superficial and deeper local anesthesia was obtained with 1% lidocaine.  A 5-cm 20-gauge Grewal needle was then advanced into the mass with ultrasound guidance.  Longitudinal imaging then confirmed that the needle is in the mass.  The wire was then deployed and post procedure CC and ML imaging confirmed placement.    Printed mammographic and sonographic images were then marked by myself and transported with the patient.    The patient tolerated the procedure well and no complication was experienced.    IMPRESSION:  Sonographic localization of the left breast mass.          INTERPRETING LOCATION: [address], [id number]

## 2017-03-17 NOTE — PROCEDURES
EXAMINATION:  3/17/2017 7:45 AM    HISTORY/REASON FOR EXAM:  Left breast cancer         TECHNIQUE/EXAM DESCRIPTION AND NUMBER OF VIEWS:  [place] node injection.    COMPARISON: None    TECHNIQUE:    Four locations around the areola were anesthetized with lidocaine.  0.538 mCi technetium 99m sulphur colloid was given intradermally, at four locations surrounding the areola of the LEFT breast.    FINDINGS:    Procedure was performed under aseptic conditions with local anesthesia.  Radionuclide was injected intradermally at four locations surrounding the areola of the breast.  No complications were encountered.    IMPRESSION:  Successful injection of radionuclide at four locations surrounding the LEFT breast areola.

## 2017-03-17 NOTE — OP REPORT
Operative Report    Date: 3/17/2017    Surgeon: Katy Gastelum M.D.    Assistant: Nena VASQUES    Anesthesiologist: Jerzy HUBER     Pre-operative Diagnosis:  C50.412 Malignant neoplasm of upper-outer quadrant of left female breast    Post-operative Diagnosis: C50.412 Malignant neoplasm of upper-outer quadrant of left female breast     Procedure: left breast needle localized partial mastectomy, left axillary sentinel lymph node biopsy, injection for identification of sentinel lymph node    Findings: negative left axillary sentinel lymph node. Tumor and biopsy clip within excised specimen.     Procedure in detail: The patient was identified in the pre-operative holding area and brought to the operating room. Prior to the procedure, she underwent needle localization with radiology due to the non-palpable nature of the lesion. As well, she underwent radionucleotide injection by nuclear medicine.    Correct side and site were identified. Pre-operative antibiotics of ancef were administered prior to the procedure. Anesthesia was smoothly induced.    I injected 5 cc of methylene blue under the areola of the left breast, and the breast was then massaged briefly. The patient was then prepped and draped in the usual sterile fashion.    I began with the sentinel lymph node biopsy portion of the procedure. A transverse incision was made in the lower end of the axilla after anesthetizing the skin. The incision was carried deeper into the axillary tissue and a blue-stained lymphatic was identified and traced distally to a left lymph node, which was partially blue stained and highly radioactive. This was dissected free from its surrounding tissue. This was sent to pathology for immediate evaluation. The residual radioactivity in the surgical bed was <10% of the initial count. Pathology results no evidence of metastatic carcinoma.     After ensuring hemostasis, and irrigating the wound, I then closed the wound in two layers  with vicryl and monocryl.    I then turned my attention to the partial mastectomy. The skin was infiltrated with local anesthetic and a curvilinear incision was made in the upper outer quadrant. The breast tissue was grasped with Allis clamps and a core of tissue was removed around the localization wire. The specimen was then completely removed, marked with suture for orientation, and was sent to Radiology for mammogram. This revealed the mass and biopsy clip to be within the excised specimen. Additional tissue from the superior, inferior, deep, medial and lateral margins were excised, marked for new margin, and sent for permanent pathology.Meticulous hemostasis was achieved with electrocautery. The area was irrigated and suctioned. The skin was closed in two layers with vicryl and monocryl. Sterile dressings were applied.     The patient was awakened from anesthetic, and was taken to the recovery room in stable condition.    Sponge and needle counts were correct at the end of the case.     Specimen:   1. left axillary sentinel lymph node   2. left breast partial mastecomy  3. Additional tissue, deep margin, suture marks new margin  4. Additional tissue, lateral margin, suture marks new margin  5. Additional tissue, medial margin, suture marks new margin  6. Additional tissue, superior margin, suture marks new margin  7. Additional tissue, inferior margin, suture marks new margin    EBL: 50 mL    Dispo: stable, extubated, to PACU    Katy Gastelum M.D.  [facility]  [phone]

## 2017-03-17 NOTE — IP AVS SNAPSHOT
" Home Care Instructions                                                                                                                Name:Katie Monroy  Medical Record Number:[id number]  CSN: [id number]    YOB: 1945   Age: 72 y.o.  Sex: female  HT:1.626 m (5' 4\") WT: 70.2 kg (154 lb 12.2 oz)          Admit Date: 3/17/2017     Discharge Date:   Today's Date: 3/17/2017  Attending Doctor:  Katy Gastelum M.D.                  Allergies:  Review of patient's allergies indicates no known allergies.                Discharge Instructions         ACTIVITY: Rest and take it easy for the first 24 hours.  A responsible adult is recommended to remain with you during that time.  It is normal to feel sleepy.  We encourage you to not do anything that requires balance, judgment or coordination.    MILD FLU-LIKE SYMPTOMS ARE NORMAL. YOU MAY EXPERIENCE GENERALIZED MUSCLE ACHES, THROAT IRRITATION, HEADACHE AND/OR SOME NAUSEA.    FOR 24 HOURS DO NOT:  Drive, operate machinery or run household appliances.  Drink beer or alcoholic beverages.   Make important decisions or sign legal documents.    SPECIAL INSTRUCTIONS:Discharge Instructions for Lumpectomy and [place] Lymph Node Biospy:    On the day of surgery we will be removing the lump of your breast cancer (lumpectomy) and through a separate incision under your arm we will be taking out the 2-3 lymph nodes that drain your breast (sentinel lymph node dissection).    Wound Care  1. You will have 2 incisions: 1) on your breast (lumpectomy) and 2) under your arm (sentinel lymph node biopsy).  2. All of your stitches are buried under the skin and dissolveable. There are no sutures to remove. Your dressing is waterproog.  3. You can shower the day after your surgery and get your wound wet (it will be sealed by the waterproof dressings)  4. You may have some bruising after surgery. This is normal.  5. There may be a small amount of drainage from your wounds, this is usually " normal and nothing to worry about. Place a dry gauze or bandage (available at any drug store) on the wound to absorb any drainage.  6. You can remove the dressing 2 days after surgery.    For Pain  1. Wear your bra as much as possible including to bed. The less your breast moves the less it will hurt.  2. You may take Tylenol or Advil every 4-6 hours as directed on the bottle.  3. You will be given a prescription for more severe pain. You can use it as needed.    Work  1. If you would like, you may return to work the day after your biopsy.  2. If you need a work excuse for the day of surgery or for any days thereafter, please let us know.    When to call the doctor  1. If you have severe, uncontrolled pain at the biopsy site.  2. If you run at fever of 100.5°F or higher within a few days of the biopsy.  3. If you have a large amount of drainage that is soaking the bandage more than once a day.  4. If the area around your wound becomes red/inflamed.  5. Make sure you schedule and attend all follow-up visits with your doctor. You should have a follow up appointment in about a week after surgery.    If you have any additional questions, please do not hesitate to call the office and speak to either myself or the physician on call.    Office address:  [facility] [place] [address]      Katy Gastelum MD  [facility]  [phone]    DIET: To avoid nausea, slowly advance diet as tolerated, avoiding spicy or greasy foods for the first day.  Add more substantial food to your diet according to your physician's instructions.  Babies can be fed formula or breast milk as soon as they are hungry.  INCREASE FLUIDS AND FIBER TO AVOID CONSTIPATION.    SURGICAL DRESSING/BATHING: MAY SHOWER TOMORROW    FOLLOW-UP APPOINTMENT:  A follow-up appointment should be arranged with your doctor in 1 WEEK; call to schedule.    You should CALL YOUR PHYSICIAN if you develop:  Fever greater than 101 degrees F.  Pain not relieved by  medication, or persistent nausea or vomiting.  Excessive bleeding (blood soaking through dressing) or unexpected drainage from the wound.  Extreme redness or swelling around the incision site, drainage of pus or foul smelling drainage.  Inability to urinate or empty your bladder within 8 hours.  Problems with breathing or chest pain.    You should call 911 if you develop problems with breathing or chest pain.  If you are unable to contact your doctor or surgical center, you should go to the nearest emergency room or urgent care center.  Physician's telephone #: DR AARON [phone]    If any questions arise, call your doctor.  If your doctor is not available, please feel free to call the Surgical Center at [phone].  The Center is open Monday through Friday from 7AM to 7PM.  You can also call the [facility] HOTLINE open 24 hours/day, 7 days/week and speak to a nurse at [phone], or toll free at [phone].    A registered nurse may call you a few days after your surgery to see how you are doing after your procedure.    MEDICATIONS: Resume taking daily medication.  Take prescribed pain medication with food.  If no medication is prescribed, you may take non-aspirin pain medication if needed.  PAIN MEDICATION CAN BE VERY CONSTIPATING.  Take a stool softener or laxative such as senokot, pericolace, or milk of magnesia if needed.    Prescription given for NORCO  Last pain medication given at 7738    If your physician has prescribed pain medication that includes Acetaminophen (Tylenol), do not take additional Acetaminophen (Tylenol) while taking the prescribed medication.    Depression / Suicide Risk    As you are discharged from this [facility] facility, it is important to learn how to keep safe from harming yourself.    Recognize the warning signs:  · Abrupt changes in personality, positive or negative- including increase in energy   · Giving away possessions  · Change in eating patterns- significant  weight changes-  positive or negative  · Change in sleeping patterns- unable to sleep or sleeping all the time   · Unwillingness or inability to communicate  · Depression  · Unusual sadness, discouragement and loneliness  · Talk of wanting to die  · Neglect of personal appearance   · Rebelliousness- reckless behavior  · Withdrawal from people/activities they love  · Confusion- inability to concentrate     If you or a loved one observes any of these behaviors or has concerns about self-harm, here's what you can do:  · Talk about it- your feelings and reasons for harming yourself  · Remove any means that you might use to hurt yourself (examples: pills, rope, extension cords, firearm)  · Get professional help from the community (Mental Health, Substance Abuse, psychological counseling)  · Do not be alone:Call your Safe Contact- someone whom you trust who will be there for you.  · Call your local CRISIS HOTLINE 810-8140 or 782-062-4503  · Call your local Children's Mobile Crisis Response Team [place] (783) 887-1351 or [employer]  · Call the toll free National Suicide Prevention Hotlines   · National Suicide Prevention Lifeline 201-408-QFAR (9988)  · National Hope Line Network 800-SUICIDE (279-4188)       Medication List      ASK your doctor about these medications        Instructions    COQ-10 PO    Take 1 Tab by mouth every day.   Dose:  1 Tab       cyanocobalamin 500 MCG Tabs   Commonly known as:  VITAMIN B-12    Take 500 mcg by mouth every day.   Dose:  500 mcg       ibuprofen 200 MG Tabs   Commonly known as:  MOTRIN    Take 400 mg by mouth every 8 hours as needed.   Dose:  400 mg       losartan 25 MG Tabs   Commonly known as:  COZAAR    Take 1 Tab by mouth every day.   Dose:  25 mg       therapeutic multivitamin-minerals Tabs    Take 1 Tab by mouth every day.   Dose:  1 Tab       vitamin D 1000 UNIT Tabs   Commonly known as:  cholecalciferol    Take 1,000 Units by mouth every day.   Dose:  1000 Units                Medication Information     Above and/or attached are the medications your physician expects you to take upon discharge. Review all of your home medications and newly ordered medications with your doctor and/or pharmacist. Follow medication instructions as directed by your doctor and/or pharmacist. Please keep your medication list with you and share with your physician. Update the information when medications are discontinued, doses are changed, or new medications (including over-the-counter products) are added; and carry medication information at all times in the event of emergency situations.        Resources     Quit Smoking / Tobacco Use:    I understand the use of any tobacco products increases my chance of suffering from future heart disease or stroke and could cause other illnesses which may shorten my life. Quitting the use of tobacco products is the single most important thing I can do to improve my health. For further information on smoking / tobacco cessation call a Toll Free Quit Line at 1-744.626.4375 (*National Cancer [place]) or 1-271.396.6561 (American Lung Association) or you can access the web based program at www.lungEmergent One.org.    [place] Tobacco Users Help Line:  (887) 758-5063       Toll Free: 1-878.909.4674  Quit Tobacco Program [facility] (193)711-4033    Crisis Hotline:    [place] Crisis Hotline:  6-963-BFNQVYN or 1-136.761.8313    [place] Crisis Hotline:    1-510.741.4087 or 004-923-7782    Discharge Survey:   Thank you for choosing [facility]. We hope we did everything we could to make your hospital stay a pleasant one. You may be receiving a survey and we would appreciate your time and participation in answering the questions. Your input is very valuable to us in our efforts to improve our service to our patients and their families.            Signatures     My signature on this form indicates that:    1. I acknowledge receipt and understanding of  these Home Care Instruction.  2. My questions regarding this information have been answered to my satisfaction.  3. I have formulated a plan with my discharge nurse to obtain my prescribed medications for home.    __________________________________      __________________________________                   Patient Signature                                 Guardian/Responsible Adult Signature      __________________________________                 __________       ________                       Nurse Signature                                               Date                 Time

## 2017-03-17 NOTE — IP AVS SNAPSHOT
3/17/2017          Katie Monroy  [address] [place] [address]    Dear Kaite:    On license of [facility] wants to ensure your discharge home is safe and you or your loved ones have had all your questions answered regarding your care after you leave the hospital.    You may receive a telephone call within two days of your discharge.  This call is to make certain you understand your discharge instructions as well as ensure we provided you with the best care possible during your stay with us.     The call will only last approximately 3-5 minutes and will be done by a nurse.    Once again, we want to ensure your discharge home is safe and that you have a clear understanding of any next steps in your care.  If you have any questions or concerns, please do not hesitate to contact us, we are here for you.  Thank you for choosing [facility] for your healthcare needs.    Sincerely,    Yusef Raman    [facility]

## 2017-03-17 NOTE — DISCHARGE SUMMARY
Discharge Instructions for Lumpectomy and [place] Lymph Node Biospy:    On the day of surgery we will be removing the lump of your breast cancer (lumpectomy) and through a separate incision under your arm we will be taking out the 2-3 lymph nodes that drain your breast (sentinel lymph node dissection).    Wound Care  1. You will have 2 incisions: 1) on your breast (lumpectomy) and 2) under your arm (sentinel lymph node biopsy).  2. All of your stitches are buried under the skin and dissolveable. There are no sutures to remove. Your dressing is waterproog.  3. You can shower the day after your surgery and get your wound wet (it will be sealed by the waterproof dressings)  4. You may have some bruising after surgery. This is normal.  5. There may be a small amount of drainage from your wounds, this is usually normal and nothing to worry about. Place a dry gauze or bandage (available at any drug store) on the wound to absorb any drainage.  6. You can remove the dressing 2 days after surgery.     For Pain  1. Wear your bra as much as possible including to bed. The less your breast moves the less it will hurt.  2. You may take Tylenol or Advil every 4-6 hours as directed on the bottle.  3. You will be given a prescription for more severe pain. You can use it as needed.    Work  1. If you would like, you may return to work the day after your biopsy.  2. If you need a work excuse for the day of surgery or for any days thereafter, please let us know.    When to call the doctor  1. If you have severe, uncontrolled pain at the biopsy site.  2. If you run at fever of 100.5?F or higher within a few days of the biopsy.  3. If you have a large amount of drainage that is soaking the bandage more than once a day.  4. If the area around your wound becomes red/inflamed.  5. Make sure you schedule and attend all follow-up visits with your doctor. You should have a follow up appointment in about a week after surgery.    If you have  any additional questions, please do not hesitate to call the office and speak to either myself or the physician on call.    Office address:  [address] [place] [address]      Katy Gastelum MD  [facility]  [phone]

## 2017-04-07 ENCOUNTER — HOSPITAL ENCOUNTER (OUTPATIENT)
Dept: RADIATION ONCOLOGY | Facility: MEDICAL CENTER | Age: 72
End: 2017-04-30
Attending: RADIOLOGY
Payer: MEDICARE

## 2017-04-07 DIAGNOSIS — C50.412 MALIGNANT NEOPLASM OF UPPER-OUTER QUADRANT OF LEFT FEMALE BREAST (HCC): ICD-10-CM

## 2017-04-07 PROCEDURE — 99214 OFFICE O/P EST MOD 30 MIN: CPT | Performed by: RADIOLOGY

## 2017-04-07 PROCEDURE — 99205 OFFICE O/P NEW HI 60 MIN: CPT | Performed by: RADIOLOGY

## 2017-04-07 NOTE — CONSULTS
DATE OF SERVICE:  2017    REFERRING PHYSICIAN:  Katy Gastelum MD.    OTHER PHYSICIANS:  Rosalva Martins MD and Barbi Guzman MD.    Dear Katy, I had the pleasure of seeing this patient today.  As you know,   she is a 72-year-old female who was recently diagnosed with a T1cN0 triple   negative breast cancer, grade III, status post lumpectomy and node dissection   with negative margins.    HISTORY OF PRESENT ILLNESS:  She originally presented with a routine mammogram   on  showing a slightly ill-defined margined left upper lobe mass.   Ultrasound and mammogram were subsequently done and she ultimately underwent   a biopsy on  showing a triple negative, high-grade infiltrating   ductal carcinoma.  Lumpectomy and sentinel node dissection on  again   revealed triple negative breast cancer, margins negative.  One sentinel node   was taken out and negative.  There was no DCIS.  The tumor measured 1.1 cm in   greatest dimension.  There was no lymphovascular invasion.  Postoperatively,   she has been doing well.  She has a little bit of soreness.  She is here to   discuss radiation therapy.  She discussed chemotherapy with Dr. Martins who   is also going to be seeing her again to discuss it in 2 weeks.    ALLERGIES:  None.    MEDICATIONS:  1.  Omega-3 fatty acids.  2.  Motrin.  3.  Cozaar.  4.  Coenzyme Q10.  5.  Theragran.  6.  Vitamin B12.  7.  Cholecalciferol.    PAST MEDICAL HISTORY:  She has a history of arthritis, asthma, skin cancers   removed in the left nasal ala and in the left arm; hyperlipidemia, left breast   cancer as described above.    PAST SURGICAL HISTORY:  She had right ovary removed at age 22 due to assist   and the needle biopsy and partial mastectomy as described above.  She is    7, para 2, 4 interrupted pregnancies.  Menarche at age 14.  She is   postmenopausal following menopause.  She was on hormones for 10 years.  She   quit about 6 years  ago.    SOCIAL HISTORY:  She drinks about 2 glasses a month, does not smoke.  She is a   retired  for 40 years and her Gnosticist is Citizen of [place] Protestant.    She has 2 children, 1 grandchild.    FAMILY HISTORY:  Her father had lung cancer.    REVIEW OF SYSTEMS:  She has arthritis in the right knee, no pain at this   point, has hot flashes, easy bruising.  The rest of the review of systems is   negative and is in the EMR.    PHYSICAL EXAMINATION:  GENERAL:  She has a KPS of 100.  VITAL SIGNS:  Weight 155, height 64, pulse 85, respirations 16, temperature   97.9, blood pressure 117/70, and O2 sat is 95%.  HEENT:  Normocephalic and atraumatic.  Sclerae are anicteric.  Extraocular   movements are intact.  Ears, nose, and mouth, within normal limits.  No   preauricular neck, supraclavicular, or axillary rogerio adenopathy.  I looked at   her left ear and she does have an irregularity inside the right ear and on   the pinna.  HEART:  Regular rate.  LUNGS:  Clear.  ABDOMEN:  Obese without hepatomegaly.  EXTREMITIES:  Without clubbing, cyanosis, or edema.  NEUROLOGIC:  Nonfocal.  BREASTS:  On examination of the left breast, she has the lumpectomy site in   the upper outer quadrant and in the axilla, both are well healed.  There is   induration and there are associated ecchymotic changes throughout the breast.    She has dense fibroglandular changes in both breasts.    IMPRESSION:  A 72-year-old femal, status post lumpectomy and node dissection   for a triple negative breast cancer.    RECOMMENDATIONS:  Discussed with the patient, she is definitely a candidate   for radiation therapy to decrease her chance of local recurrence within the   breast.  She is trying to decide whether she wants chemotherapy or not.  I   told her that she is to discuss that with Dr. Martins.  She has another   appointment with Dr. Martins in 2 weeks and so at this point, she is   thinking she may not do chemotherapy.  Because of  that, we have her   tentatively scheduled for simulation the day after her appointment with Dr. Martins.  She notes we will cancel this if she does decide to proceed with   chemotherapy.  I have discussed the details of the radiation along with the   side effects, both acute and chronic including, but not exclusive to   generalized fatigue, damage to the tissues within the treatment field   including local soreness, sunburn type of reaction on the skin, damage to the   heart, lungs, and chest wall.  She understands that.  I have told her that the   abnormality in her left ear could be a pre skin cancer and she just needs to   make an appointment to see her dermatologist within the next 6 months.       ____________________________________     MD ANIBAL STOKES / CHETAN    DD:  04/07/2017 11:46:20  DT:  04/07/2017 12:14:04    D#:  [id number]  Job#:  [id number]

## 2017-04-07 NOTE — LETTER
April 7, 2017        Katie Monroy            [id number]        Current Outpatient Prescriptions   Medication Sig Dispense Refill   • Omega-3 Fatty Acids (OMEGA 3 PO) Take  by mouth.     • ibuprofen (MOTRIN) 200 MG Tab Take 400 mg by mouth every 8 hours as needed.     • losartan (COZAAR) 25 MG Tab Take 1 Tab by mouth every day. 90 Tab 3   • Coenzyme Q10 (COQ-10 PO) Take 1 Tab by mouth every day.     • therapeutic multivitamin-minerals (THERAGRAN-M) Tab Take 1 Tab by mouth every day.     • cyanocobalamin (VITAMIN B-12) 500 MCG Tab Take 500 mcg by mouth every day.     • vitamin D (CHOLECALCIFEROL) 1000 UNIT Tab Take 1,000 Units by mouth every day.       No current facility-administered medications for this encounter.                       Iliana Oliveira

## 2017-04-19 ENCOUNTER — HOSPITAL ENCOUNTER (OUTPATIENT)
Dept: RADIATION ONCOLOGY | Facility: MEDICAL CENTER | Age: 72
End: 2017-04-19

## 2017-04-19 PROCEDURE — 77290 THER RAD SIMULAJ FIELD CPLX: CPT | Performed by: RADIOLOGY

## 2017-04-19 PROCEDURE — 77263 THER RADIOLOGY TX PLNG CPLX: CPT | Performed by: RADIOLOGY

## 2017-04-19 PROCEDURE — 77290 THER RAD SIMULAJ FIELD CPLX: CPT | Mod: 26 | Performed by: RADIOLOGY

## 2017-04-19 PROCEDURE — 77334 RADIATION TREATMENT AID(S): CPT | Mod: 26 | Performed by: RADIOLOGY

## 2017-04-19 PROCEDURE — 77334 RADIATION TREATMENT AID(S): CPT | Performed by: RADIOLOGY

## 2017-04-27 PROCEDURE — 77295 3-D RADIOTHERAPY PLAN: CPT | Mod: 26 | Performed by: RADIOLOGY

## 2017-04-27 PROCEDURE — 77295 3-D RADIOTHERAPY PLAN: CPT | Performed by: RADIOLOGY

## 2017-04-27 PROCEDURE — 77334 RADIATION TREATMENT AID(S): CPT | Mod: 26 | Performed by: RADIOLOGY

## 2017-04-27 PROCEDURE — 77300 RADIATION THERAPY DOSE PLAN: CPT | Performed by: RADIOLOGY

## 2017-04-27 PROCEDURE — 77300 RADIATION THERAPY DOSE PLAN: CPT | Mod: 26 | Performed by: RADIOLOGY

## 2017-04-27 PROCEDURE — 77334 RADIATION TREATMENT AID(S): CPT | Performed by: RADIOLOGY

## 2017-05-01 ENCOUNTER — HOSPITAL ENCOUNTER (OUTPATIENT)
Dept: RADIATION ONCOLOGY | Facility: MEDICAL CENTER | Age: 72
End: 2017-05-01

## 2017-05-01 ENCOUNTER — HOSPITAL ENCOUNTER (OUTPATIENT)
Dept: RADIATION ONCOLOGY | Facility: MEDICAL CENTER | Age: 72
End: 2017-05-31
Attending: RADIOLOGY
Payer: MEDICARE

## 2017-05-01 PROCEDURE — 77280 THER RAD SIMULAJ FIELD SMPL: CPT | Mod: 26 | Performed by: RADIOLOGY

## 2017-05-01 PROCEDURE — 77412 RADIATION TX DELIVERY LVL 3: CPT | Performed by: RADIOLOGY

## 2017-05-01 PROCEDURE — 77280 THER RAD SIMULAJ FIELD SMPL: CPT | Performed by: RADIOLOGY

## 2017-05-02 PROCEDURE — 77412 RADIATION TX DELIVERY LVL 3: CPT | Performed by: RADIOLOGY

## 2017-05-02 PROCEDURE — 77417 THER RADIOLOGY PORT IMAGE(S): CPT | Performed by: RADIOLOGY

## 2017-05-03 PROCEDURE — 77336 RADIATION PHYSICS CONSULT: CPT | Performed by: RADIOLOGY

## 2017-05-03 PROCEDURE — 77412 RADIATION TX DELIVERY LVL 3: CPT | Performed by: RADIOLOGY

## 2017-05-04 PROCEDURE — 77412 RADIATION TX DELIVERY LVL 3: CPT | Performed by: RADIOLOGY

## 2017-05-05 PROCEDURE — 77412 RADIATION TX DELIVERY LVL 3: CPT | Performed by: RADIOLOGY

## 2017-05-05 PROCEDURE — 77427 RADIATION TX MANAGEMENT X5: CPT | Performed by: RADIOLOGY

## 2017-05-08 PROCEDURE — 77412 RADIATION TX DELIVERY LVL 3: CPT | Performed by: RADIOLOGY

## 2017-05-08 PROCEDURE — 77417 THER RADIOLOGY PORT IMAGE(S): CPT | Performed by: RADIOLOGY

## 2017-05-09 PROCEDURE — 77412 RADIATION TX DELIVERY LVL 3: CPT | Performed by: RADIOLOGY

## 2017-05-10 PROCEDURE — 77412 RADIATION TX DELIVERY LVL 3: CPT | Performed by: RADIOLOGY

## 2017-05-10 PROCEDURE — 77336 RADIATION PHYSICS CONSULT: CPT | Performed by: RADIOLOGY

## 2017-05-11 PROCEDURE — 77412 RADIATION TX DELIVERY LVL 3: CPT | Performed by: RADIOLOGY

## 2017-05-12 ENCOUNTER — PATIENT OUTREACH (OUTPATIENT)
Dept: OTHER | Facility: MEDICAL CENTER | Age: 72
End: 2017-05-12

## 2017-05-12 PROCEDURE — 77427 RADIATION TX MANAGEMENT X5: CPT | Performed by: RADIOLOGY

## 2017-05-12 PROCEDURE — 77412 RADIATION TX DELIVERY LVL 3: CPT | Performed by: RADIOLOGY

## 2017-05-12 NOTE — PROGRESS NOTES
Pt returns call.  Denies any barriers at this time.  Agreed to meet after her next XRT.  Request sent to PAR to call when pt checks in.

## 2017-05-15 ENCOUNTER — PATIENT OUTREACH (OUTPATIENT)
Dept: OTHER | Facility: MEDICAL CENTER | Age: 72
End: 2017-05-15

## 2017-05-15 ENCOUNTER — TELEPHONE (OUTPATIENT)
Dept: OTHER | Facility: MEDICAL CENTER | Age: 72
End: 2017-05-15

## 2017-05-15 PROCEDURE — 77417 THER RADIOLOGY PORT IMAGE(S): CPT | Performed by: RADIOLOGY

## 2017-05-15 PROCEDURE — 77412 RADIATION TX DELIVERY LVL 3: CPT | Performed by: RADIOLOGY

## 2017-05-15 NOTE — PROGRESS NOTES
Met with pt and  post XRT.  She states she has some mild redness at tx site.  She is using Cetaphil and feels it is working well.  She also reports bruising around her nipple that began last Thursday.  Requested she report this to her radiation oncologist for evaluation.    Education provided on lymphedema.  She states that this was discussed with her physician and massage techniques were reviewed.  A referral was mentioned but pt has not heard back.    Pt does also admit to fatigue.  She is interested in increasing her activity level with exercise.  Referred pt to [facility] program including information on [employer] scholarship.     admits that they are accessing [employer] funds to help cover cost of co pays.  Provided [employer] application and encouraged them to apply asap.    Contact information provided.  Will be available.

## 2017-05-16 PROCEDURE — 77412 RADIATION TX DELIVERY LVL 3: CPT | Performed by: RADIOLOGY

## 2017-05-17 PROCEDURE — 77334 RADIATION TREATMENT AID(S): CPT | Mod: 26 | Performed by: RADIOLOGY

## 2017-05-17 PROCEDURE — 77412 RADIATION TX DELIVERY LVL 3: CPT | Performed by: RADIOLOGY

## 2017-05-17 PROCEDURE — 77307 TELETHX ISODOSE PLAN CPLX: CPT | Mod: 26 | Performed by: RADIOLOGY

## 2017-05-17 PROCEDURE — 77336 RADIATION PHYSICS CONSULT: CPT | Mod: XU | Performed by: RADIOLOGY

## 2017-05-17 PROCEDURE — 77334 RADIATION TREATMENT AID(S): CPT | Performed by: RADIOLOGY

## 2017-05-17 PROCEDURE — 77307 TELETHX ISODOSE PLAN CPLX: CPT | Performed by: RADIOLOGY

## 2017-05-18 PROCEDURE — 77412 RADIATION TX DELIVERY LVL 3: CPT | Performed by: RADIOLOGY

## 2017-05-19 PROCEDURE — 77427 RADIATION TX MANAGEMENT X5: CPT | Performed by: RADIOLOGY

## 2017-05-19 PROCEDURE — 77412 RADIATION TX DELIVERY LVL 3: CPT | Performed by: RADIOLOGY

## 2017-05-22 ENCOUNTER — HOSPITAL ENCOUNTER (OUTPATIENT)
Dept: RADIATION ONCOLOGY | Facility: MEDICAL CENTER | Age: 72
End: 2017-05-22

## 2017-05-22 ENCOUNTER — PATIENT OUTREACH (OUTPATIENT)
Dept: HEALTH INFORMATION MANAGEMENT | Facility: OTHER | Age: 72
End: 2017-05-22

## 2017-05-22 PROCEDURE — 77280 THER RAD SIMULAJ FIELD SMPL: CPT | Performed by: RADIOLOGY

## 2017-05-22 PROCEDURE — 77014 PR CT GUIDANCE PLACEMENT RAD THERAPY FIELDS: CPT | Mod: 26 | Performed by: RADIOLOGY

## 2017-05-22 PROCEDURE — 77412 RADIATION TX DELIVERY LVL 3: CPT | Performed by: RADIOLOGY

## 2017-05-22 NOTE — PROGRESS NOTES
On May 22, 2017,  Ernestina Gupta met with pt. following radiation treatment.  Pt. shared she had a little discharge at the nipple this weekend.  Pt. informed radiation therapist and states she spoke to Dr. Burks. Pt. shared she was diagnosed with breast cancer in February 2017 and was unexpected.  Pt. began crying stating its been really hard and she does not like to talk about it with her  who is worried about her.  Pt. reports  is supportive and comes with her to all of her appointments.  Pt. is on her last week of radiation treatments.  Pt. had a total of 4 weeks of treatment.   Pt. shared financially it has been challenging as they have a $3400 copay amount.  Pt. has [facility].  Pt. states she is an 8 on distress primarily due to feeling overwhelmed.  Pt. was given information on [facility].  Pt. agreed she would call to make an appointment.  Pt. also informed ELIECER Gupta she completed the [employer] application this weekend even though she thinks other people who have more need should have the assistance before her.  Pt. declined counseling referral at this time.

## 2017-05-23 PROCEDURE — 77387 GUIDANCE FOR RADJ TX DLVR: CPT | Performed by: RADIOLOGY

## 2017-05-23 PROCEDURE — 77412 RADIATION TX DELIVERY LVL 3: CPT | Performed by: RADIOLOGY

## 2017-05-23 PROCEDURE — 77014 PR CT GUIDANCE PLACEMENT RAD THERAPY FIELDS: CPT | Mod: 26 | Performed by: RADIOLOGY

## 2017-05-24 PROCEDURE — 77336 RADIATION PHYSICS CONSULT: CPT | Performed by: RADIOLOGY

## 2017-05-24 PROCEDURE — 77014 PR CT GUIDANCE PLACEMENT RAD THERAPY FIELDS: CPT | Mod: 26 | Performed by: RADIOLOGY

## 2017-05-24 PROCEDURE — 77412 RADIATION TX DELIVERY LVL 3: CPT | Performed by: RADIOLOGY

## 2017-05-24 PROCEDURE — 77387 GUIDANCE FOR RADJ TX DLVR: CPT | Performed by: RADIOLOGY

## 2017-05-25 PROCEDURE — 77412 RADIATION TX DELIVERY LVL 3: CPT | Performed by: RADIOLOGY

## 2017-05-25 PROCEDURE — 77387 GUIDANCE FOR RADJ TX DLVR: CPT | Performed by: RADIOLOGY

## 2017-05-25 PROCEDURE — 77014 PR CT GUIDANCE PLACEMENT RAD THERAPY FIELDS: CPT | Mod: 26 | Performed by: RADIOLOGY

## 2017-05-26 PROCEDURE — 77014 PR CT GUIDANCE PLACEMENT RAD THERAPY FIELDS: CPT | Mod: 26 | Performed by: RADIOLOGY

## 2017-05-26 PROCEDURE — 77387 GUIDANCE FOR RADJ TX DLVR: CPT | Performed by: RADIOLOGY

## 2017-05-26 PROCEDURE — 77427 RADIATION TX MANAGEMENT X5: CPT | Performed by: RADIOLOGY

## 2017-05-26 PROCEDURE — 77412 RADIATION TX DELIVERY LVL 3: CPT | Performed by: RADIOLOGY

## 2017-06-08 ENCOUNTER — RX ONLY (OUTPATIENT)
Age: 72
Setting detail: RX ONLY
End: 2017-06-08

## 2017-06-08 PROBLEM — D49.2 NEOPLASM OF UNSPECIFIED BEHAVIOR OF BONE, SOFT TISSUE, AND SKIN: Status: ACTIVE | Noted: 2017-06-08

## 2017-06-09 DIAGNOSIS — T30.0 BURN OF SKIN: ICD-10-CM
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
## 2017-06-14 ENCOUNTER — NON-PROVIDER VISIT (OUTPATIENT)
Dept: WOUND CARE | Facility: MEDICAL CENTER | Age: 72
End: 2017-06-14
Attending: INTERNAL MEDICINE
Payer: MEDICARE

## 2017-06-14 PROCEDURE — 97161 PT EVAL LOW COMPLEX 20 MIN: CPT

## 2017-06-14 PROCEDURE — 97140 MANUAL THERAPY 1/> REGIONS: CPT

## 2017-06-14 NOTE — CERTIFICATION
"Advanced Wound Care  [facility]  [address]  [address]  [place] [address]  [phone] Fax: [phone]      Initial Evaluation  For Certification Period:      Referring Physician: Katy Gastelum MD  Primary Physician:      Barbi Guzman MD  Consulting Physicians:    Vitaliy     Lymphedema: L breast and UE      Start of Care:     6/14/17  Subjective:        HPI:        Pt felt fullness in left breast after lumpectomy on 3/17/17         Pain:        'Discomfort\" L breast    Past Medical History:  Past Medical History   Diagnosis Date   • Prediabetes    • Irritable bowel syndrome    • Plantar fasciitis of right foot    • Breast cancer (CMS-[facility])    • Bunion      both feet   • Arthritis      osteo   • Cancer (CMS-[facility])      skin   • Cancer (CMS-[facility]) 2017     breast   • Hypertension 2017     pt states well controlled on meds   • Asthma      does not use inhalers     Current Medications:  Current Outpatient Prescriptions on File Prior to Visit   Medication Sig Dispense Refill   • silver sulfADIAZINE (SILVADENE) 1 % Cream Apply 1 g to affected area(s) every day. 1 Each 1   • Omega-3 Fatty Acids (OMEGA 3 PO) Take  by mouth.     • ibuprofen (MOTRIN) 200 MG Tab Take 400 mg by mouth every 8 hours as needed.     • losartan (COZAAR) 25 MG Tab Take 1 Tab by mouth every day. 90 Tab 3   • Coenzyme Q10 (COQ-10 PO) Take 1 Tab by mouth every day.     • therapeutic multivitamin-minerals (THERAGRAN-M) Tab Take 1 Tab by mouth every day.     • cyanocobalamin (VITAMIN B-12) 500 MCG Tab Take 500 mcg by mouth every day.     • vitamin D (CHOLECALCIFEROL) 1000 UNIT Tab Take 1,000 Units by mouth every day.       No current facility-administered medications on file prior to visit.     Allergies:   Review of patient's allergies indicates no known allergies.  Past Surgical History:   Past Surgical History   Procedure Laterality Date   • Other       right ovary removed   • Mastectomy Left 3/17/2017     Procedure: MASTECTOMY - " PARTIAL, WIRE LOCALIZED;  Surgeon: Katy Gastelum M.D.;  Location: SURGERY [facility];  Service:    • Node biopsy sentinel Left 3/17/2017     Procedure: NODE BIOPSY SENTINEL;  Surgeon: Katy Gastelum M.D.;  Location: SURGERY [facility];  Service:      Social History:     pt is    Objective:    Tests and Measures:    Orthotic, protective, supportive devices:      Wound Characteristics                                                    Location:   Initial Evaluation  Date: 6/15/17   ADL    ROM    Posture Left scap wininging   Edema Pt states left breast cu size is a DD - used to be a 34 C 5 years ago and both breasts have enlarged over the years   activities Pt used to go to gym just prior to diagnosis, likes to swim   skin Pt has a small pink area in axilla that is resolving wound from radiation.  Slightly sunken appearing nipple, erythema, light brown discoloration, enlarged pores, hyperkeratosis lower half of left breast   tissue Left breast is full at midline with pt in supine (right is not).  Pt feels greater fullness at waist line on the left                             Measurements: Initial Evaluation  Date: 6/15/17                            Procedures: Pt has a power port on the right chest wall   PT evaluation manual therapy 2 units  MLD for left brest and upper arm using contra ax and ipsi inguinal  Taught pt self MLD  Garment - pt has a good bra she obtained from [employer].  Showed pt the wear ease bras and she may order a large of the basic bra.  Compression - placed a gray foam 1/4 inch in a tubi  and into lower bra to add support    Education - importance of managing lymphedema    Professional Collaboration: 6/14/17 eval sent referring provider      Assessment:      Lymphedema etiology: secondary to surgery    Progress: - pt states the edema seems to be improving     Rationale for Treatment: improve circulation of the left breast and upper arm    Patient tolerance/compliance: pt  agreeable to POC    Complicating factors: surgery, scar tissue    Need for ongoing Advanced Wound Care services: manual therapy, therex, functional activity      Plan:      Treatment Plan and Recommendations:  Diagnosis/ICD9: secondary lymphedema  Procedures/CPT:  Manual therapy, therex, functional therapy    Frequency: 2x/week      Treatment Goals:     Perfrom therex I     Perform self MLD I     Obtain compression garment     Left scap position more symmetrical to right                                         At the time of each visit a thorough assessment of the patient is completed to assure the  appropriateness of our plan of care.  The dressings or modalities may need to be adapted   from the original plan to address any significant changes in the wound environment.          Clinician Signature:_______________________________Date__________________      Physician Signature:______________________________Date:__________________

## 2017-06-15 PROCEDURE — 97161 PT EVAL LOW COMPLEX 20 MIN: CPT

## 2017-06-15 PROCEDURE — 97140 MANUAL THERAPY 1/> REGIONS: CPT

## 2017-06-28 ENCOUNTER — NON-PROVIDER VISIT (OUTPATIENT)
Dept: WOUND CARE | Facility: MEDICAL CENTER | Age: 72
End: 2017-06-28
Attending: INTERNAL MEDICINE
Payer: MEDICARE

## 2017-06-28 PROCEDURE — 97140 MANUAL THERAPY 1/> REGIONS: CPT

## 2017-06-28 NOTE — WOUND TEAM
"Advanced Wound Care  [facility]  [address]  [address]  [place] [address]  [phone] Fax: [phone]      Lymphedema Encounter Note  For Certification Period:  6-14-17 to 7-14-17      Referring Physician: Katy Gastelum MD  Primary Physician:      Barbi Guzman MD  Consulting Physicians:    Vitaliy     Lymphedema: L breast and UE      Start of Care:     6/14/17  Subjective:        SUBJECTIVE:  6-28-17 - pt is performing self massage daily but needs reinforcement for proper velocity and pressure.  C/o slight L nipple drainage after radiation.    HPI:        Pt felt fullness in left breast after lumpectomy on 3/17/17         Pain:        'Discomfort\" L breast    Past Medical History:  Past Medical History   Diagnosis Date   • Prediabetes    • Irritable bowel syndrome    • Plantar fasciitis of right foot    • Breast cancer (CMS-[facility])    • Bunion      both feet   • Arthritis      osteo   • Cancer (CMS-[facility])      skin   • Cancer (CMS-[facility]) 2017     breast   • Hypertension 2017     pt states well controlled on meds   • Asthma      does not use inhalers     Current Medications:  Current Outpatient Prescriptions on File Prior to Visit   Medication Sig Dispense Refill   • silver sulfADIAZINE (SILVADENE) 1 % Cream Apply 1 g to affected area(s) every day. 1 Each 1   • Omega-3 Fatty Acids (OMEGA 3 PO) Take  by mouth.     • ibuprofen (MOTRIN) 200 MG Tab Take 400 mg by mouth every 8 hours as needed.     • losartan (COZAAR) 25 MG Tab Take 1 Tab by mouth every day. 90 Tab 3   • Coenzyme Q10 (COQ-10 PO) Take 1 Tab by mouth every day.     • therapeutic multivitamin-minerals (THERAGRAN-M) Tab Take 1 Tab by mouth every day.     • cyanocobalamin (VITAMIN B-12) 500 MCG Tab Take 500 mcg by mouth every day.     • vitamin D (CHOLECALCIFEROL) 1000 UNIT Tab Take 1,000 Units by mouth every day.       No current facility-administered medications on file prior to visit.     Allergies:   Review of patient's allergies indicates " no known allergies.  Past Surgical History:   Past Surgical History   Procedure Laterality Date   • Other       right ovary removed   • Mastectomy Left 3/17/2017     Procedure: MASTECTOMY - PARTIAL, WIRE LOCALIZED;  Surgeon: Katy Gastelum M.D.;  Location: SURGERY [facility];  Service:    • Node biopsy sentinel Left 3/17/2017     Procedure: NODE BIOPSY SENTINEL;  Surgeon: Katy Gastelum M.D.;  Location: SURGERY [facility];  Service:      Social History:     pt is , is indep in home w/ self care, walks/hikes w/  3-4-x/wk, indep in kitchen, retired, wants to begin work due to boredom.  Belongs to a gym and will increase her treatment w/ pool and precautions.     Objective:    Tests and Measures:  Circumference B breasts    Orthotic, protective, supportive devices:   6-28-17 - no sleeves presently, new padded bra which is comfortable and sufficient     Wound Characteristics                                                    Location:   Initial Evaluation  Date: 6/15/17   ADL    ROM B shoulder wnl flexion   Posture Left scap winging   Edema Pt states left breast cu size is a DD - used to be a 34 C 5 years ago and both breasts have enlarged over the years   activities Pt used to go to gym just prior to diagnosis, likes to swim   skin Pt has a small pink area in axilla that is resolving wound from radiation.  Slightly sunken appearing nipple, erythema, light brown discoloration, enlarged pores, hyperkeratosis lower half of left breast   tissue Left breast is full at midline with pt in supine (right is not).  Pt feels greater fullness at waist line on the left                             Measurements: Initial Evaluation  Date: 6/15/17 6-28-17     Breast lateral carmenza -   R      At nipple height across from sternum to axilla  crease 37 cm   L 38 cm.                                            Procedures and Education:  6-28-17 -   Manual therapy 45 minutes - measured breasts, home program increased  to pt to reinforce her massage to smoother , gentler, direction.  MLD L to R breasts and axillae, up to subclavian, down to inguinals.  Pt is hiking and walking 3-4 x/wk w/ , now would like to begin swimming and will check w/ MD re: wounds.  Suggested she start w/ a  for equipment and pool.  Pt felt L breast was softer after rx..  Shoulder ROM is wnl    6-15-17 Pt has a power port on the right chest wall   PT evaluation manual therapy 2 units  MLD for left brest and upper arm using contra ax and ipsi inguinal  Taught pt self MLD  Garment - pt has a good bra she obtained from [employer].  Showed pt the wear ease bras and she may order a large of the basic bra.  Compression - placed a gray foam 1/4 inch in a tubi  and into lower bra to add support    Education - importance of managing lymphedema    Professional Collaboration: 6/14/17 eval sent referring provider      Assessment:      Lymphedema etiology: secondary to surgery    Progress: - pt states the edema seems to be improving     Rationale for Treatment: improve circulation of the left breast and upper arm    Patient tolerance/compliance: pt agreeable to POC    Complicating factors: surgery, scar tissue    Need for ongoing Advanced Wound Care services: manual therapy, therex, functional activity      Plan:      Treatment Plan and Recommendations:  Diagnosis/ICD9: secondary lymphedema  Procedures/CPT:  Manual therapy, therex, functional therapy    Frequency: 2x/week      Treatment Goals:     Perfrom therex I     Perform self MLD I     Obtain compression garment     Left scap position more symmetrical to right                                         At the time of each visit a thorough assessment of the patient is completed to assure the  appropriateness of our plan of care.  The dressings or modalities may need to be adapted   from the original plan to address any significant changes in the wound environment.          Clinician  Signature:_______________________________Date__________________      Physician Signature:______________________________Date:__________________

## 2017-07-06 ENCOUNTER — HOSPITAL ENCOUNTER (OUTPATIENT)
Dept: LAB | Facility: MEDICAL CENTER | Age: 72
End: 2017-07-06
Attending: FAMILY MEDICINE
Payer: MEDICARE

## 2017-07-06 DIAGNOSIS — R79.89 ELEVATED TSH: ICD-10-CM

## 2017-07-06 DIAGNOSIS — I10 ESSENTIAL HYPERTENSION: ICD-10-CM

## 2017-07-06 DIAGNOSIS — E78.00 PURE HYPERCHOLESTEROLEMIA: ICD-10-CM

## 2017-07-06 LAB
ALBUMIN SERPL BCP-MCNC: 4.1 G/DL (ref 3.2–4.9)
ALBUMIN/GLOB SERPL: 1.6 G/DL
ALP SERPL-CCNC: 68 U/L (ref 30–99)
ALT SERPL-CCNC: 16 U/L (ref 2–50)
ANION GAP SERPL CALC-SCNC: 6 MMOL/L (ref 0–11.9)
AST SERPL-CCNC: 18 U/L (ref 12–45)
BASOPHILS # BLD AUTO: 0.8 % (ref 0–1.8)
BASOPHILS # BLD: 0.04 K/UL (ref 0–0.12)
BILIRUB SERPL-MCNC: 0.6 MG/DL (ref 0.1–1.5)
BUN SERPL-MCNC: 12 MG/DL (ref 8–22)
CALCIUM SERPL-MCNC: 9.1 MG/DL (ref 8.5–10.5)
CHLORIDE SERPL-SCNC: 107 MMOL/L (ref 96–112)
CHOLEST SERPL-MCNC: 203 MG/DL (ref 100–199)
CO2 SERPL-SCNC: 25 MMOL/L (ref 20–33)
CREAT SERPL-MCNC: 0.66 MG/DL (ref 0.5–1.4)
EOSINOPHIL # BLD AUTO: 0.11 K/UL (ref 0–0.51)
EOSINOPHIL NFR BLD: 2.3 % (ref 0–6.9)
ERYTHROCYTE [DISTWIDTH] IN BLOOD BY AUTOMATED COUNT: 43.6 FL (ref 35.9–50)
GFR SERPL CREATININE-BSD FRML MDRD: >60 ML/MIN/1.73 M 2
GLOBULIN SER CALC-MCNC: 2.6 G/DL (ref 1.9–3.5)
GLUCOSE SERPL-MCNC: 99 MG/DL (ref 65–99)
HCT VFR BLD AUTO: 50.1 % (ref 37–47)
HDLC SERPL-MCNC: 53 MG/DL
HGB BLD-MCNC: 16 G/DL (ref 12–16)
IMM GRANULOCYTES # BLD AUTO: 0.03 K/UL (ref 0–0.11)
IMM GRANULOCYTES NFR BLD AUTO: 0.6 % (ref 0–0.9)
LDLC SERPL CALC-MCNC: 126 MG/DL
LYMPHOCYTES # BLD AUTO: 1.29 K/UL (ref 1–4.8)
LYMPHOCYTES NFR BLD: 26.7 % (ref 22–41)
MCH RBC QN AUTO: 29.2 PG (ref 27–33)
MCHC RBC AUTO-ENTMCNC: 31.9 G/DL (ref 33.6–35)
MCV RBC AUTO: 91.4 FL (ref 81.4–97.8)
MONOCYTES # BLD AUTO: 0.35 K/UL (ref 0–0.85)
MONOCYTES NFR BLD AUTO: 7.2 % (ref 0–13.4)
NEUTROPHILS # BLD AUTO: 3.02 K/UL (ref 2–7.15)
NEUTROPHILS NFR BLD: 62.4 % (ref 44–72)
NRBC # BLD AUTO: 0 K/UL
NRBC BLD AUTO-RTO: 0 /100 WBC
PLATELET # BLD AUTO: 211 K/UL (ref 164–446)
PMV BLD AUTO: 9.9 FL (ref 9–12.9)
POTASSIUM SERPL-SCNC: 4.3 MMOL/L (ref 3.6–5.5)
PROT SERPL-MCNC: 6.7 G/DL (ref 6–8.2)
RBC # BLD AUTO: 5.48 M/UL (ref 4.2–5.4)
SODIUM SERPL-SCNC: 138 MMOL/L (ref 135–145)
T3 SERPL-MCNC: 118.2 NG/DL (ref 60–181)
T4 FREE SERPL-MCNC: 1.04 NG/DL (ref 0.53–1.43)
TRIGL SERPL-MCNC: 118 MG/DL (ref 0–149)
TSH SERPL DL<=0.005 MIU/L-ACNC: 3.59 UIU/ML (ref 0.3–3.7)
WBC # BLD AUTO: 4.8 K/UL (ref 4.8–10.8)

## 2017-07-06 PROCEDURE — 84443 ASSAY THYROID STIM HORMONE: CPT

## 2017-07-06 PROCEDURE — 80061 LIPID PANEL: CPT

## 2017-07-06 PROCEDURE — 84480 ASSAY TRIIODOTHYRONINE (T3): CPT

## 2017-07-06 PROCEDURE — 85025 COMPLETE CBC W/AUTO DIFF WBC: CPT

## 2017-07-06 PROCEDURE — 80053 COMPREHEN METABOLIC PANEL: CPT

## 2017-07-06 PROCEDURE — 84439 ASSAY OF FREE THYROXINE: CPT

## 2017-07-06 PROCEDURE — 36415 COLL VENOUS BLD VENIPUNCTURE: CPT

## 2017-07-07 ENCOUNTER — HOSPITAL ENCOUNTER (OUTPATIENT)
Dept: RADIATION ONCOLOGY | Facility: MEDICAL CENTER | Age: 72
End: 2017-07-31
Attending: RADIOLOGY
Payer: MEDICARE

## 2017-07-07 ENCOUNTER — NON-PROVIDER VISIT (OUTPATIENT)
Dept: WOUND CARE | Facility: MEDICAL CENTER | Age: 72
End: 2017-07-07
Attending: INTERNAL MEDICINE
Payer: MEDICARE

## 2017-07-07 ENCOUNTER — PATIENT OUTREACH (OUTPATIENT)
Dept: OTHER | Facility: MEDICAL CENTER | Age: 72
End: 2017-07-07

## 2017-07-07 VITALS
BODY MASS INDEX: 26.46 KG/M2 | SYSTOLIC BLOOD PRESSURE: 113 MMHG | TEMPERATURE: 97.9 F | OXYGEN SATURATION: 97 % | HEART RATE: 66 BPM | WEIGHT: 154.2 LBS | DIASTOLIC BLOOD PRESSURE: 56 MMHG

## 2017-07-07 PROCEDURE — 99212 OFFICE O/P EST SF 10 MIN: CPT | Mod: 27 | Performed by: RADIOLOGY

## 2017-07-07 PROCEDURE — 97140 MANUAL THERAPY 1/> REGIONS: CPT

## 2017-07-07 PROCEDURE — 99211 OFF/OP EST MAY X REQ PHY/QHP: CPT

## 2017-07-07 ASSESSMENT — PAIN SCALES - GENERAL: PAINLEVEL_OUTOF10: 0

## 2017-07-07 NOTE — PROGRESS NOTES
RADIATION ONCOLOGY FOLLOW-UP    DATE OF SERVICE: 7/7/2017    IDENTIFICATION:   A 72 y.o. female with pathologic T1 cN0 stage IA triple negative, grade 3 invasive ductal carcinoma status post lumpectomy and sentinel node dissection. She refused chemotherapy completed radiation therapy 5/26/2017.      HISTORY OF PRESENT ILLNESS:   Patient developed a brisk erythematous reaction after she completed radiation therapy and she was seen by her dermatologist who gave her some Silvadene cream. She's also been getting some lymphedema therapy and the swelling in her left breast has improved. Her energy is good she still has a little bit of sensitivity in the left axilla, and mild fatigue.    CURRENT MEDICATIONS:  Current Outpatient Prescriptions   Medication Sig Dispense Refill   • Omega-3 Fatty Acids (OMEGA 3 PO) Take  by mouth.     • ibuprofen (MOTRIN) 200 MG Tab Take 400 mg by mouth every 8 hours as needed.     • losartan (COZAAR) 25 MG Tab Take 1 Tab by mouth every day. 90 Tab 3   • Coenzyme Q10 (COQ-10 PO) Take 1 Tab by mouth every day.     • therapeutic multivitamin-minerals (THERAGRAN-M) Tab Take 1 Tab by mouth every day.     • cyanocobalamin (VITAMIN B-12) 500 MCG Tab Take 500 mcg by mouth every day.     • vitamin D (CHOLECALCIFEROL) 1000 UNIT Tab Take 1,000 Units by mouth every day.     • silver sulfADIAZINE (SILVADENE) 1 % Cream Apply 1 g to affected area(s) every day. 1 Each 1     No current facility-administered medications for this encounter.       ALLERGIES:  Review of patient's allergies indicates no known allergies.    FAMILY HISTORY:    Family History   Problem Relation Age of Onset   • Cancer Mother      leukemia   • Cancer Father      lung   • Heart Disease Mother      arrhythmia   • Diabetes Neg Hx             SOCIAL HISTORY:     reports that she has never smoked. She has never used smokeless tobacco. She reports that she drinks alcohol. She reports that she does not use illicit drugs.    REVIEW OF SYSTEMS:  Pertinent positives consist of that mentioned in the HPI    PHYSICAL EXAM:    Filed Vitals:    07/07/17 0924   BP: 113/56   Pulse: 66   Temp: 36.6 °C (97.9 °F)   Weight: 69.945 kg (154 lb 3.2 oz)   SpO2: 97%    NO PAIN TODAY         GENERAL: Well-appearing alert and oriented ×3 in no apparent distress  Breasts: Bilaterally symmetrical pendulous fibroglandular. The left breast has a little bit of erythema there is also new skin formation in the left axilla and nipple. There is only a slight amount of lymphedema but there are skin changes adjacent to the lumpectomy and axillary node dissection site. No masses are appreciated.  HEENT:  Pupils are equal, round, and reactive to light.  Extraocular muscles   are intact. Sclerae nonicteric.  Conjunctivae pink.  Oral cavity, tongue   protrudes midline.   NECK:  Supple without evidence of thyromegaly.  NODES:  No peripheral adenopathy of the neck, supraclavicular fossa or axillae   bilaterally.     EXTREMITIES:  Without Edema.  NEUROLOGIC:  Cranial nerves II through XII were intact. Normal stance and gait. Motor and sensory grossly within normal limits.    ECOG PERFORMANCE STATUS:  1= Restricted in physically strenuous activity, but ambulatory and able to carry out work of a light sedentary nature, e.g., light housework, office work.    LABORATORY DATA:   Lab Results   Component Value Date/Time    SODIUM 138 07/06/2017 09:39 AM    POTASSIUM 4.3 07/06/2017 09:39 AM    CHLORIDE 107 07/06/2017 09:39 AM    CO2 25 07/06/2017 09:39 AM    GLUCOSE 99 07/06/2017 09:39 AM    BUN 12 07/06/2017 09:39 AM    CREATININE 0.66 07/06/2017 09:39 AM     Lab Results   Component Value Date/Time    ALKALINE PHOSPHATASE 68 07/06/2017 09:39 AM    AST(SGOT) 18 07/06/2017 09:39 AM    ALT(SGPT) 16 07/06/2017 09:39 AM    TOTAL BILIRUBIN 0.6 07/06/2017 09:39 AM      Lab Results   Component Value Date/Time    WBC 4.8 07/06/2017 09:39 AM    RBC 5.48* 07/06/2017 09:39 AM    HEMOGLOBIN 16.0 07/06/2017 09:39  AM    HEMATOCRIT 50.1* 07/06/2017 09:39 AM    MCV 91.4 07/06/2017 09:39 AM    MCH 29.2 07/06/2017 09:39 AM    MCHC 31.9* 07/06/2017 09:39 AM    MPV 9.9 07/06/2017 09:39 AM    NEUTROPHILS-POLYS 62.40 07/06/2017 09:39 AM    LYMPHOCYTES 26.70 07/06/2017 09:39 AM    MONOCYTES 7.20 07/06/2017 09:39 AM    EOSINOPHILS 2.30 07/06/2017 09:39 AM    BASOPHILS 0.80 07/06/2017 09:39 AM          IMPRESSION:    A 72 y.o. with all negative grade 3 stage I breast cancer status post radiation therapy refusing chemotherapy.    RECOMMENDATIONS:   She plans to see Dr. Rhodes sometime this summer and Dr. Gastelum in September after her ipsilateral mammogram, so I will see her back in the spring after she's had bilateral mammograms.      Thank you for the opportunity to participate in her care.  If any questions or comments, please do not hesitate in calling.      Please note that this dictation was created using voice recognition software. I have made every reasonable attempt to correct obvious errors, but I expect that there are errors of grammar and possibly content that I did not discover before finalizing the note.

## 2017-07-07 NOTE — NON-PROVIDER
Patient was seen today in clinic with Dr. Burks for follow up.  Vitals signs and weight were obtained and pain assessment was completed.  Allergies and medications were reviewed with the patient.  Toxicities of treatment assessed.     Vitals/Pain:  Filed Vitals:    07/07/17 0924   BP: 113/56   Pulse: 66   Temp: 36.6 °C (97.9 °F)   Weight: 69.945 kg (154 lb 3.2 oz)   SpO2: 97%      NO PAIN TODAY      Allergies:   Review of patient's allergies indicates no known allergies.    Current Medications:  Current Outpatient Prescriptions   Medication Sig Dispense Refill   • Omega-3 Fatty Acids (OMEGA 3 PO) Take  by mouth.     • ibuprofen (MOTRIN) 200 MG Tab Take 400 mg by mouth every 8 hours as needed.     • losartan (COZAAR) 25 MG Tab Take 1 Tab by mouth every day. 90 Tab 3   • Coenzyme Q10 (COQ-10 PO) Take 1 Tab by mouth every day.     • therapeutic multivitamin-minerals (THERAGRAN-M) Tab Take 1 Tab by mouth every day.     • cyanocobalamin (VITAMIN B-12) 500 MCG Tab Take 500 mcg by mouth every day.     • vitamin D (CHOLECALCIFEROL) 1000 UNIT Tab Take 1,000 Units by mouth every day.     • silver sulfADIAZINE (SILVADENE) 1 % Cream Apply 1 g to affected area(s) every day. 1 Each 1     No current facility-administered medications for this encounter.            PCP:  Megan Lloyd R.N.  7/7/2017  9:27 AM

## 2017-07-07 NOTE — CERTIFICATION
"Advanced Wound Care  [facility]  [address]  [address]  [place] [address]  [phone] Fax: [phone]      Lymphedema DISCHARGE Note  For Certification Period:  6-14-17 to 7-14-17      Referring Physician: Katy Gastelum MD  Primary Physician:      Barbi Guzman MD  Consulting Physicians:    Vitaliy     Lymphedema: L breast and UE      Start of Care:     6/14/17  Subjective:        SUBJECTIVE:  7-7-17 - DISCHARGE NOTE:  Pt has progressed well w/ her breast size w/ decreased redness, softer, indep home program and is ready to DC her lymphedema treatments here.  She has been assisting w/ care of her grandchild but needs reminding to be cautious.  Has started swimming regularly and feels improvement.    6-28-17 - pt is performing self massage daily but needs reinforcement for proper velocity and pressure.  C/o slight L nipple drainage after radiation.    HPI:        Pt felt fullness in left breast after lumpectomy on 3/17/17         Pain:        'Discomfort\" L breast    Past Medical History:    Current Medications:    Allergies:   Review of patient's allergies indicates no known allergies.  Past Surgical History:     Social History:     pt is , is indep in home w/ self care, walks/hikes w/  3-4-x/wk, indep in kitchen, retired, wants to begin work due to boredom.  Belongs to a gym and will increase her treatment w/ pool and precautions.     Objective:    Tests and Measures:  Circumference B breasts    Orthotic, protective, supportive devices:   6-28-17 - no sleeves presently, new padded bra which is comfortable and sufficient     Wound Characteristics                                                    Location:   Initial Evaluation  Date: 6/15/17   ADL    ROM B shoulder wnl flexion   Posture Left scap winging   Edema Pt states left breast cu size is a DD - used to be a 34 C 5 years ago and both breasts have enlarged over the years   activities Pt used to go to gym just prior to " diagnosis, likes to swim   skin Pt has a small pink area in axilla that is resolving wound from radiation.  Slightly sunken appearing nipple, erythema, light brown discoloration, enlarged pores, hyperkeratosis lower half of left breast   tissue Left breast is full at midline with pt in supine (right is not).  Pt feels greater fullness at waist line on the left                             Measurements: Initial Evaluation  Date: 6/15/17 6-28-17     Breast lateral carmenza -   R      At nipple height across from sternum to axilla  crease 37 cm   L 38 cm.                                            Procedures and Education:  6-28-17 -   Manual therapy 45 minutes - measured breasts, home program increased to pt to reinforce her massage to smoother , gentler, direction.  MLD L to R breasts and axillae, up to subclavian, down to inguinals.  Pt is hiking and walking 3-4 x/wk w/ , now would like to begin swimming and will check w/ MD re: wounds.  Suggested she start w/ a  for equipment and pool.  Pt felt L breast was softer after rx..  Shoulder ROM is wnl    6-15-17 Pt has a power port on the right chest wall   PT evaluation manual therapy 2 units  MLD for left brest and upper arm using contra ax and ipsi inguinal  Taught pt self MLD  Garment - pt has a good bra she obtained from [employer].  Showed pt the wear ease bras and she may order a large of the basic bra.  Compression - placed a gray foam 1/4 inch in a tubi  and into lower bra to add support    Education - importance of managing lymphedema    Professional Collaboration:  7-7-17 - DISCHARGE sent to Dr. Gastelum   6/14/17 eval sent referring provider      Assessment:      Lymphedema etiology: secondary to surgery    Progress: - pt states the edema seems to be improving     Rationale for Treatment: improve circulation of the left breast and upper arm    Patient tolerance/compliance: pt agreeable to POC    Complicating factors: surgery, scar tissue    Need for  ongoing Advanced Wound Care services: manual therapy, therex, functional activity      Plan:      Treatment Plan and Recommendations:  Diagnosis/ICD9: secondary lymphedema  Procedures/CPT:  Manual therapy, therex, functional therapy    Frequency: 2x/week      Treatment Goals: 7-7-17 DISCHARGE    Perfrom therex I met    Perform self MLD I met    Obtain compression garment Bra obtained    Left scap position more symmetrical to right met                                         At the time of each visit a thorough assessment of the patient is completed to assure the  appropriateness of our plan of care.  The dressings or modalities may need to be adapted   from the original plan to address any significant changes in the wound environment.          Clinician Signature:_______________________________Date__________________      Physician Signature:______________________________Date:__________________

## 2017-07-10 NOTE — PROGRESS NOTES
Met with pt to review Survivorship Care Plan.  Pt was diagnosed with triple negative breast cancer in March of 2017.  She underwnet a partial lumpectomy and SNB as well as radiation therapy.  No systemic therapy noted.  Reviewed potential long term side effects of cancer treatment.    Pt reported shanna numbness in her breast as well an intermittent shooting pain.  No family hx.  She is due to see her medical oncologist in August.  Her care will be transferred to another medical oncologist within [facility] due to the departure of Dr. George.  She is not sure who she will see for follow up.  She did report that her surgeon Dr. Gastelum has ordered her f/u mammograms.    The pt reports a 5/10 on distress citing finances as her primary concern.  She states that she has not yet received all of her medical bills and is not clear on what her cost will be.  Provided contact information for [facility].  She intends to reenter the work force as she has been financially impacted by her care.    She reports a weight gain of 10lbs over the last several months.  She is trying to address this with increasing her activity level and improving her diet.  RD referral offered, pt declined.    Education and resources provided.

## 2017-07-12 ENCOUNTER — HOSPITAL ENCOUNTER (OUTPATIENT)
Facility: MEDICAL CENTER | Age: 72
End: 2017-07-12
Attending: FAMILY MEDICINE
Payer: MEDICARE

## 2017-07-12 ENCOUNTER — APPOINTMENT (OUTPATIENT)
Dept: WOUND CARE | Facility: MEDICAL CENTER | Age: 72
End: 2017-07-12
Attending: INTERNAL MEDICINE
Payer: MEDICARE

## 2017-07-12 PROCEDURE — 82274 ASSAY TEST FOR BLOOD FECAL: CPT

## 2017-07-15 DIAGNOSIS — Z12.11 ENCOUNTER FOR FIT (FECAL IMMUNOCHEMICAL TEST) SCREENING: ICD-10-CM

## 2017-07-15 LAB — HEMOCCULT STL QL IA: NEGATIVE

## 2017-07-17 DIAGNOSIS — R71.8 ELEVATED HEMATOCRIT: ICD-10-CM

## 2017-08-14 ENCOUNTER — TELEPHONE (OUTPATIENT)
Dept: MEDICAL GROUP | Age: 72
End: 2017-08-14

## 2017-08-14 DIAGNOSIS — C50.412 MALIGNANT NEOPLASM OF UPPER-OUTER QUADRANT OF LEFT FEMALE BREAST (HCC): ICD-10-CM

## 2017-09-05 ENCOUNTER — TELEPHONE (OUTPATIENT)
Dept: RADIOLOGY | Facility: MEDICAL CENTER | Age: 72
End: 2017-09-05

## 2017-09-05 NOTE — TELEPHONE ENCOUNTER
Spoke w/ pt to conf apt @ [facility] on 9/6 @ 10:30 check in @ 10:15, reviewed prep and location

## 2017-09-06 ENCOUNTER — HOSPITAL ENCOUNTER (OUTPATIENT)
Dept: RADIOLOGY | Facility: MEDICAL CENTER | Age: 72
End: 2017-09-06
Attending: SURGERY
Payer: MEDICARE

## 2017-09-06 DIAGNOSIS — C50.419 MALIGNANT NEOPLASM OF UPPER-OUTER QUADRANT OF FEMALE BREAST, UNSPECIFIED LATERALITY: ICD-10-CM

## 2017-09-06 PROCEDURE — 76642 ULTRASOUND BREAST LIMITED: CPT | Mod: LT

## 2017-09-06 PROCEDURE — G0279 TOMOSYNTHESIS, MAMMO: HCPCS

## 2017-11-09 ENCOUNTER — APPOINTMENT (OUTPATIENT)
Dept: MEDICAL GROUP | Age: 72
End: 2017-11-09
Payer: MEDICARE

## 2017-11-14 ENCOUNTER — APPOINTMENT (OUTPATIENT)
Dept: LAB | Facility: MEDICAL CENTER | Age: 72
End: 2017-11-14
Attending: INTERNAL MEDICINE
Payer: MEDICARE

## 2017-11-14 ENCOUNTER — HOSPITAL ENCOUNTER (OUTPATIENT)
Dept: LAB | Facility: MEDICAL CENTER | Age: 72
End: 2017-11-14
Attending: FAMILY MEDICINE
Payer: MEDICARE

## 2017-11-14 LAB
ALBUMIN SERPL BCP-MCNC: 4.2 G/DL (ref 3.2–4.9)
ALBUMIN/GLOB SERPL: 1.4 G/DL
ALP SERPL-CCNC: 80 U/L (ref 30–99)
ALT SERPL-CCNC: 13 U/L (ref 2–50)
ANION GAP SERPL CALC-SCNC: 8 MMOL/L (ref 0–11.9)
AST SERPL-CCNC: 16 U/L (ref 12–45)
BASOPHILS # BLD AUTO: 0.7 % (ref 0–1.8)
BASOPHILS # BLD: 0.06 K/UL (ref 0–0.12)
BILIRUB SERPL-MCNC: 0.5 MG/DL (ref 0.1–1.5)
BUN SERPL-MCNC: 12 MG/DL (ref 8–22)
CALCIUM SERPL-MCNC: 9.3 MG/DL (ref 8.5–10.5)
CHLORIDE SERPL-SCNC: 104 MMOL/L (ref 96–112)
CO2 SERPL-SCNC: 25 MMOL/L (ref 20–33)
CREAT SERPL-MCNC: 0.51 MG/DL (ref 0.5–1.4)
EOSINOPHIL # BLD AUTO: 0.22 K/UL (ref 0–0.51)
EOSINOPHIL NFR BLD: 2.6 % (ref 0–6.9)
ERYTHROCYTE [DISTWIDTH] IN BLOOD BY AUTOMATED COUNT: 42.5 FL (ref 35.9–50)
GFR SERPL CREATININE-BSD FRML MDRD: >60 ML/MIN/1.73 M 2
GLOBULIN SER CALC-MCNC: 3 G/DL (ref 1.9–3.5)
GLUCOSE SERPL-MCNC: 97 MG/DL (ref 65–99)
HCT VFR BLD AUTO: 46.6 % (ref 37–47)
HGB BLD-MCNC: 15.1 G/DL (ref 12–16)
IMM GRANULOCYTES # BLD AUTO: 0.04 K/UL (ref 0–0.11)
IMM GRANULOCYTES NFR BLD AUTO: 0.5 % (ref 0–0.9)
LYMPHOCYTES # BLD AUTO: 1.68 K/UL (ref 1–4.8)
LYMPHOCYTES NFR BLD: 19.9 % (ref 22–41)
MCH RBC QN AUTO: 29 PG (ref 27–33)
MCHC RBC AUTO-ENTMCNC: 32.4 G/DL (ref 33.6–35)
MCV RBC AUTO: 89.6 FL (ref 81.4–97.8)
MONOCYTES # BLD AUTO: 0.53 K/UL (ref 0–0.85)
MONOCYTES NFR BLD AUTO: 6.3 % (ref 0–13.4)
NEUTROPHILS # BLD AUTO: 5.91 K/UL (ref 2–7.15)
NEUTROPHILS NFR BLD: 70 % (ref 44–72)
NRBC # BLD AUTO: 0 K/UL
NRBC BLD AUTO-RTO: 0 /100 WBC
PLATELET # BLD AUTO: 264 K/UL (ref 164–446)
PMV BLD AUTO: 10.2 FL (ref 9–12.9)
POTASSIUM SERPL-SCNC: 3.8 MMOL/L (ref 3.6–5.5)
PROT SERPL-MCNC: 7.2 G/DL (ref 6–8.2)
RBC # BLD AUTO: 5.2 M/UL (ref 4.2–5.4)
SODIUM SERPL-SCNC: 137 MMOL/L (ref 135–145)
WBC # BLD AUTO: 8.4 K/UL (ref 4.8–10.8)

## 2017-11-14 PROCEDURE — 80053 COMPREHEN METABOLIC PANEL: CPT

## 2017-11-14 PROCEDURE — 85025 COMPLETE CBC W/AUTO DIFF WBC: CPT

## 2017-11-14 PROCEDURE — 36415 COLL VENOUS BLD VENIPUNCTURE: CPT

## 2017-11-18 ENCOUNTER — APPOINTMENT (OUTPATIENT)
Dept: RADIOLOGY | Facility: IMAGING CENTER | Age: 72
End: 2017-11-18
Attending: PHYSICIAN ASSISTANT
Payer: MEDICARE

## 2017-11-18 ENCOUNTER — OFFICE VISIT (OUTPATIENT)
Dept: URGENT CARE | Facility: CLINIC | Age: 72
End: 2017-11-18
Payer: MEDICARE

## 2017-11-18 VITALS
RESPIRATION RATE: 18 BRPM | BODY MASS INDEX: 25.1 KG/M2 | OXYGEN SATURATION: 96 % | SYSTOLIC BLOOD PRESSURE: 122 MMHG | HEART RATE: 100 BPM | HEIGHT: 64 IN | DIASTOLIC BLOOD PRESSURE: 72 MMHG | WEIGHT: 147 LBS | TEMPERATURE: 98.6 F

## 2017-11-18 DIAGNOSIS — J01.40 ACUTE NON-RECURRENT PANSINUSITIS: ICD-10-CM

## 2017-11-18 DIAGNOSIS — J40 BRONCHITIS: ICD-10-CM

## 2017-11-18 DIAGNOSIS — J06.9 URI WITH COUGH AND CONGESTION: ICD-10-CM

## 2017-11-18 DIAGNOSIS — J40 BRONCHITIS: Primary | ICD-10-CM

## 2017-11-18 PROCEDURE — 99214 OFFICE O/P EST MOD 30 MIN: CPT | Performed by: PHYSICIAN ASSISTANT

## 2017-11-18 PROCEDURE — 71020 DX-CHEST-2 VIEWS: CPT | Mod: TC | Performed by: PHYSICIAN ASSISTANT

## 2017-11-18 RX ORDER — DOXYCYCLINE HYCLATE 100 MG
100 TABLET ORAL 2 TIMES DAILY
Qty: 14 TAB | Refills: 0 | Status: SHIPPED | OUTPATIENT
Start: 2017-11-18 | End: 2017-11-25

## 2017-11-18 RX ORDER — ALBUTEROL SULFATE 90 UG/1
2 AEROSOL, METERED RESPIRATORY (INHALATION) EVERY 6 HOURS PRN
Qty: 8.5 G | Refills: 0 | Status: SHIPPED | OUTPATIENT
Start: 2017-11-18 | End: 2017-11-28

## 2017-11-18 RX ORDER — METHYLPREDNISOLONE 4 MG/1
TABLET ORAL
Qty: 21 TAB | Refills: 0 | Status: SHIPPED | OUTPATIENT
Start: 2017-11-18 | End: 2018-02-01

## 2017-11-18 RX ORDER — PROMETHAZINE HYDROCHLORIDE AND CODEINE PHOSPHATE 6.25; 1 MG/5ML; MG/5ML
5 SYRUP ORAL 4 TIMES DAILY PRN
Qty: 240 ML | Refills: 0 | Status: SHIPPED | OUTPATIENT
Start: 2017-11-18 | End: 2017-12-02

## 2017-11-19 NOTE — PROGRESS NOTES
Subjective:      Pt is a 72 y.o. female who presents with Sinus Problem (cough, fever today)            HPI  PT presents to  clinic today complaining of sore throat,  pressure in ears, cough, fatigue, runny nose, wheezing and SOB. PT denies CP, NVD, abdominal pain, joint pain. PT states these symptoms began around 3 days ago. PT states the pain is a 6/10 with coughing fits, aching in nature and worse at night.  Pt has not taken any RX medications for this condition. The pt's medication list, problem list, and allergies have been evaluated and reviewed during today's visit.    PMH:  Past Medical History:   Diagnosis Date   • Arthritis     osteo   • Asthma     does not use inhalers   • Breast cancer (CMS-[facility])    • Bunion     both feet   • Cancer (CMS-[facility])     skin   • Cancer (CMS-[facility]) 2017    breast   • Hypertension 2017    pt states well controlled on meds   • Irritable bowel syndrome    • Plantar fasciitis of right foot    • Prediabetes        PSH:  Past Surgical History:   Procedure Laterality Date   • MASTECTOMY Left 3/17/2017    Procedure: MASTECTOMY - PARTIAL, WIRE LOCALIZED;  Surgeon: Katy Gastelum M.D.;  Location: SURGERY [facility];  Service:    • NODE BIOPSY SENTINEL Left 3/17/2017    Procedure: NODE BIOPSY SENTINEL;  Surgeon: Katy Gastelum M.D.;  Location: SURGERY [facility];  Service:    • OTHER      right ovary removed       Fam Hx:    family history includes Cancer in her father and mother; Heart Disease in her mother.  Family Status   Relation Status   • Mother    • Father    • Sister Alive   • Neg Hx        Soc HX:  Social History     Social History   • Marital status:      Spouse name: N/A   • Number of children: N/A   • Years of education: N/A     Occupational History   • retired-  , [employer]      Social History Main Topics   • Smoking status: Never Smoker   • Smokeless tobacco: Never Used   • Alcohol use 0.0 oz/week      Comment:  occasional   • Drug use: No   • Sexual activity: Yes     Partners: Male     Other Topics Concern   • Not on file     Social History Narrative    [place].     .     2 daughters.              Medications:    Current Outpatient Prescriptions:   •  Omega-3 Fatty Acids (OMEGA 3 PO), Take  by mouth., Disp: , Rfl:   •  ibuprofen (MOTRIN) 200 MG Tab, Take 400 mg by mouth every 8 hours as needed., Disp: , Rfl:   •  losartan (COZAAR) 25 MG Tab, Take 1 Tab by mouth every day., Disp: 90 Tab, Rfl: 3  •  Coenzyme Q10 (COQ-10 PO), Take 1 Tab by mouth every day., Disp: , Rfl:   •  therapeutic multivitamin-minerals (THERAGRAN-M) Tab, Take 1 Tab by mouth every day., Disp: , Rfl:   •  cyanocobalamin (VITAMIN B-12) 500 MCG Tab, Take 500 mcg by mouth every day., Disp: , Rfl:   •  vitamin D (CHOLECALCIFEROL) 1000 UNIT Tab, Take 1,000 Units by mouth every day., Disp: , Rfl:   •  silver sulfADIAZINE (SILVADENE) 1 % Cream, Apply 1 g to affected area(s) every day., Disp: 1 Each, Rfl: 1      Allergies:  Patient has no known allergies.    ROS  Review of Systems   Constitutional: Positive for malaise/fatigue. Negative for fever and diaphoresis.   HENT: Positive for congestion, ear pain and sore throat. Negative for ear discharge, hearing loss, nosebleeds and tinnitus.    Eyes: Negative for blurred vision, double vision and photophobia.   Respiratory: Positive for cough, sputum production, shortness of breath and wheezing. Negative for hemoptysis.    Cardiovascular: Negative for chest pain and palpitations.   Gastrointestinal: Negative for nausea, vomiting, abdominal pain, diarrhea and constipation.   Genitourinary: Negative for dysuria and flank pain.   Musculoskeletal: Negative for joint pain and myalgias.   Skin: Negative for itching and rash.   Neurological:  Negative for dizziness, tingling and weakness.   Endo/Heme/Allergies: Does not bruise/bleed easily.   Psychiatric/Behavioral: Negative for depression. The patient is not  "nervous/anxious.             Objective:     /72   Pulse 100   Temp 37 °C (98.6 °F)   Resp 18   Ht 1.626 m (5' 4\")   Wt 66.7 kg (147 lb)   SpO2 96%   Breastfeeding? No   BMI 25.23 kg/m²      Physical Exam       Physical Exam   Constitutional: PT is oriented to person, place, and time. PT appears well-developed and well-nourished. No distress.   HENT:   Head: Normocephalic and atraumatic.   Right Ear: Hearing, tympanic membrane, external ear and ear canal normal.   Left Ear: Hearing, tympanic membrane, external ear and ear canal normal.   Nose: Mucosal edema, rhinorrhea and sinus tenderness present. Right sinus exhibits frontal sinus tenderness. Left sinus exhibits frontal sinus tenderness.   Mouth/Throat: Uvula is midline. Mucous membranes are pale. Posterior oropharyngeal edema and posterior oropharyngeal erythema present. No oropharyngeal exudate.   Eyes: Conjunctivae normal and EOM are normal. Pupils are equal, round, and reactive to light. Right eye exhibits no discharge. Left eye exhibits no discharge.   Neck: Normal range of motion. Neck supple. No thyromegaly present.   Cardiovascular: Normal rate, regular rhythm, normal heart sounds and intact distal pulses.  Exam reveals no gallop and no friction rub.    No murmur heard.  Pulmonary/Chest: Effort normal. No respiratory distress. PT has wheezes. PT has no rales. PT exhibits tenderness.   Abdominal: Soft. Bowel sounds are normal. PT exhibits no distension and no mass. There is no tenderness. There is no rebound and no guarding.   Musculoskeletal: Normal range of motion. PT exhibits no edema and no tenderness.   Lymphadenopathy:     PT has no cervical adenopathy.   Neurological: Pt is alert and oriented to person, place, and time. Pt has normal reflexes. No cranial nerve deficit.   Skin: Skin is warm and dry. No rash noted. No erythema.   Psychiatric: PT has a normal mood and affect. Pt behavior is normal. Judgment and thought content normal. "     RADS:  Narrative       11/18/2017 4:11 PM    HISTORY/REASON FOR EXAM:  Cough    TECHNIQUE/EXAM DESCRIPTION AND NUMBER OF VIEWS:  Two views of the chest.    COMPARISON: 6/24/2016    FINDINGS:  There is linear atelectasis within the left lung base.  The heart is normal in size.  There is no evidence of pleural effusion.  Soft tissues and bony structures are unremarkable.     Impression       Linear atelectasis within the left lung base.   Reading Provider Reading Date   Preet Friend M.D. Nov 18, 2017   Signing Provider Signing Date Signing Time   Preet Friend M.D. Nov 18, 2017  4:30 PM          Assessment/Plan:     1. Bronchitis    - DX-CHEST-2 VIEWS; Future    2. URI with cough and congestion    - DX-CHEST-2 VIEWS; Future    3. Acute non-recurrent pansinusitis    Doxycycline  Codeine cough syrup  Medrol pack  Albuterol  Rest, fluids encouraged.  OTC decongestant for congestion/cough  AVS with medical info given.  Pt was in full understanding and agreement with the plan.  Follow-up as needed if symptoms worsen or fail to improve.

## 2017-11-19 NOTE — PATIENT INSTRUCTIONS
Acute Bronchitis  Bronchitis is inflammation of the airways that extend from the windpipe into the lungs (bronchi). The inflammation often causes mucus to develop. This leads to a cough, which is the most common symptom of bronchitis.   In acute bronchitis, the condition usually develops suddenly and goes away over time, usually in a couple weeks. Smoking, allergies, and asthma can make bronchitis worse. Repeated episodes of bronchitis may cause further lung problems.   CAUSES  Acute bronchitis is most often caused by the same virus that causes a cold. The virus can spread from person to person (contagious) through coughing, sneezing, and touching contaminated objects.  SIGNS AND SYMPTOMS   · Cough.    · Fever.    · Coughing up mucus.    · Body aches.    · Chest congestion.    · Chills.    · Shortness of breath.    · Sore throat.    DIAGNOSIS   Acute bronchitis is usually diagnosed through a physical exam. Your health care provider will also ask you questions about your medical history. Tests, such as chest X-rays, are sometimes done to rule out other conditions.   TREATMENT   Acute bronchitis usually goes away in a couple weeks. Oftentimes, no medical treatment is necessary. Medicines are sometimes given for relief of fever or cough. Antibiotic medicines are usually not needed but may be prescribed in certain situations. In some cases, an inhaler may be recommended to help reduce shortness of breath and control the cough. A cool mist vaporizer may also be used to help thin bronchial secretions and make it easier to clear the chest.   HOME CARE INSTRUCTIONS  · Get plenty of rest.    · Drink enough fluids to keep your urine clear or pale yellow (unless you have a medical condition that requires fluid restriction). Increasing fluids may help thin your respiratory secretions (sputum) and reduce chest congestion, and it will prevent dehydration.    · Take medicines only as directed by your health care provider.  · If  you were prescribed an antibiotic medicine, finish it all even if you start to feel better.  · Avoid smoking and secondhand smoke. Exposure to cigarette smoke or irritating chemicals will make bronchitis worse. If you are a smoker, consider using nicotine gum or skin patches to help control withdrawal symptoms. Quitting smoking will help your lungs heal faster.    · Reduce the chances of another bout of acute bronchitis by washing your hands frequently, avoiding people with cold symptoms, and trying not to touch your hands to your mouth, nose, or eyes.    · Keep all follow-up visits as directed by your health care provider.    SEEK MEDICAL CARE IF:  Your symptoms do not improve after 1 week of treatment.   SEEK IMMEDIATE MEDICAL CARE IF:  · You develop an increased fever or chills.    · You have chest pain.    · You have severe shortness of breath.  · You have bloody sputum.    · You develop dehydration.  · You faint or repeatedly feel like you are going to pass out.  · You develop repeated vomiting.  · You develop a severe headache.  MAKE SURE YOU:   · Understand these instructions.  · Will watch your condition.  · Will get help right away if you are not doing well or get worse.     This information is not intended to replace advice given to you by your health care provider. Make sure you discuss any questions you have with your health care provider.     Document Released: 01/25/2006 Document Revised: 01/08/2016 Document Reviewed: 06/10/2014  [employer] Interactive Patient Education ©2016 [employer]

## 2017-11-20 ENCOUNTER — APPOINTMENT (OUTPATIENT)
Dept: MEDICAL GROUP | Age: 72
End: 2017-11-20
Payer: MEDICARE

## 2017-12-17 ENCOUNTER — OFFICE VISIT (OUTPATIENT)
Dept: URGENT CARE | Facility: CLINIC | Age: 72
End: 2017-12-17
Payer: MEDICARE

## 2017-12-17 VITALS
OXYGEN SATURATION: 94 % | SYSTOLIC BLOOD PRESSURE: 118 MMHG | BODY MASS INDEX: 26.46 KG/M2 | DIASTOLIC BLOOD PRESSURE: 70 MMHG | HEIGHT: 64 IN | RESPIRATION RATE: 18 BRPM | HEART RATE: 104 BPM | WEIGHT: 155 LBS | TEMPERATURE: 97.4 F

## 2017-12-17 DIAGNOSIS — H61.23 BILATERAL IMPACTED CERUMEN: ICD-10-CM

## 2017-12-17 PROCEDURE — 99214 OFFICE O/P EST MOD 30 MIN: CPT | Mod: 25 | Performed by: PHYSICIAN ASSISTANT

## 2017-12-17 PROCEDURE — 69210 REMOVE IMPACTED EAR WAX UNI: CPT | Performed by: PHYSICIAN ASSISTANT

## 2017-12-17 ASSESSMENT — ENCOUNTER SYMPTOMS
VOMITING: 0
PALPITATIONS: 0
DIARRHEA: 0
CHILLS: 0
COUGH: 0
FEVER: 0
DIZZINESS: 0
HEADACHES: 0
NECK PAIN: 1
ABDOMINAL PAIN: 0
NAUSEA: 0
SORE THROAT: 0
RHINORRHEA: 0

## 2017-12-17 ASSESSMENT — PAIN SCALES - GENERAL: PAINLEVEL: 7=MODERATE-SEVERE PAIN

## 2017-12-17 NOTE — PROGRESS NOTES
Subjective:      Katie Monroy is a 72 y.o. female who presents with Otalgia (began yesterday, R side ear )            Otalgia    There is pain in the right ear. This is a new problem. The current episode started yesterday (after using a Q-tip to try and remove wax from right ear). The problem occurs constantly. The problem has been unchanged. There has been no fever. The pain is mild. Associated symptoms include neck pain (ear pain radaties to right side of neck). Pertinent negatives include no abdominal pain, coughing, diarrhea, ear discharge, headaches, hearing loss, rash, rhinorrhea, sore throat or vomiting. She has tried nothing for the symptoms.       Past Medical History:   Diagnosis Date   • Arthritis     osteo   • Asthma     does not use inhalers   • Breast cancer (CMS-HCC)    • Bunion     both feet   • Cancer (CMS-HCC)     skin   • Cancer (CMS-[facility]CC) 2017    breast   • Hypertension 2017    pt states well controlled on meds   • Irritable bowel syndrome    • Plantar fasciitis of right foot    • Prediabetes      Past Surgical History:   Procedure Laterality Date   • MASTECTOMY Left 3/17/2017    Procedure: MASTECTOMY - PARTIAL, WIRE LOCALIZED;  Surgeon: Katy Gastelum M.D.;  Location: SURGERY [facility];  Service:    • NODE BIOPSY SENTINEL Left 3/17/2017    Procedure: NODE BIOPSY SENTINEL;  Surgeon: Katy Gastelum M.D.;  Location: SURGERY [facility];  Service:    • OTHER      right ovary removed       Family History   Problem Relation Age of Onset   • Cancer Mother      leukemia   • Heart Disease Mother      arrhythmia   • Cancer Father      lung   • Diabetes Neg Hx        No Known Allergies    Medications, Allergies, and current problem list reviewed today in Epic    Review of Systems   Constitutional: Negative for chills, fever and malaise/fatigue.   HENT: Positive for ear pain. Negative for congestion, ear discharge, hearing loss, rhinorrhea and sore throat.    Respiratory: Negative for cough.  "   Cardiovascular: Negative for chest pain, palpitations and leg swelling.   Gastrointestinal: Negative for abdominal pain, diarrhea, nausea and vomiting.   Musculoskeletal: Positive for neck pain (ear pain radaties to right side of neck).   Skin: Negative for rash.   Neurological: Negative for dizziness and headaches.     All other systems reviewed and are negative.        Objective:     /70   Pulse (!) 104   Temp 36.3 °C (97.4 °F)   Resp 18   Ht 1.626 m (5' 4\")   Wt 70.3 kg (155 lb)   SpO2 94%   Breastfeeding? No   BMI 26.61 kg/m²      Physical Exam   Constitutional: She is oriented to person, place, and time. She appears well-developed and well-nourished. No distress.   HENT:   Head: Normocephalic and atraumatic.   Mouth/Throat: Uvula is midline and oropharynx is clear and moist.   Ears before lavage- Both ears with complete cerumen obstruction    After lavage:  Right-cerumen removed with curette. Ear canal without erythema or edema. TM intact without mid ear effusion.  Left- partial obstruction. TM intact without erythema or mid ear effusion   Eyes: Conjunctivae are normal.   Cardiovascular: Normal rate, regular rhythm and normal heart sounds.  Exam reveals no gallop and no friction rub.    No murmur heard.  Pulmonary/Chest: Effort normal. No respiratory distress. She has no wheezes. She has no rales.   Neurological: She is alert and oriented to person, place, and time. No cranial nerve deficit.   Skin: Skin is warm and dry. No rash noted.   Psychiatric: She has a normal mood and affect. Her behavior is normal. Judgment and thought content normal.               Assessment/Plan:     1. Bilateral impacted cerumen  carbamide peroxide (CARBAMOXIDE EAR DROPS) 6.5 % Solution    AK REMOVE IMPACTED EAR WAX       Cerumen removed with curette by me.      Current Outpatient Prescriptions:   •  carbamide peroxide (CARBAMOXIDE EAR DROPS) 6.5 % Solution, Place 5-10 Drops in ear 2 times a day. Administer drops in " both ears., Disp: 1 Bottle, Rfl: 1  •y., Disp: 1 Each, Rfl: 1     Differential diagnoses, Supportive care, and indications for immediate follow-up discussed with patient.   Instructed to return to clinic or nearest emergency department for any change in condition, further concerns, or worsening of symptoms.    The patient demonstrated a good understanding and agreed with the treatment plan.    Mony Berumen P.A.-C.

## 2018-01-22 ENCOUNTER — PATIENT OUTREACH (OUTPATIENT)
Dept: HEALTH INFORMATION MANAGEMENT | Facility: OTHER | Age: 73
End: 2018-01-22

## 2018-01-22 NOTE — PROGRESS NOTES
1. Attempt #: 1    2. [facility] Verified: yes    3. Verify PCP: yes    4. Care Team Updated:       •   DME Company (gait device, O2, CPAP, etc.): YES       •   Other Specialists (eye doctor, derm, GYN, cardiology, endo, etc): NO - pt sees other providers but she didn't remember their names     5.  Reviewed/Updated the following with patient:       •   Communication Preference Obtained? YES       •   Preferred Pharmacy? YES       •   Preferred Lab? YES       •   Family History (document living status of immediate family members and if + hx of cancer, diabetes, hypertension, hyperlipidemia, heart attack, stroke) YES. Was Abstract Encounter opened and chart updated? YES    6. Luminal Activation: already active    7. Luminal Dandre: no    8. Annual Wellness Visit Scheduling  Scheduling Status:Scheduled      9. Care Gap Scheduling (Attempt to Schedule EACH Overdue Care Gap!)     Health Maintenance Due   Topic Date Due   • COLONOSCOPY  02/10/1996   • BONE DENSITY  02/10/2011   • IMM INFLUENZA (1) 09/01/2017        Scheduled patient for Annual Wellness Visit      10. Patient was advised: “This is a free wellness visit. The provider will screen for medical conditions to help you stay healthy. If you have other concerns to address you may be asked to discuss these at a separate visit or there may be an additional fee.”     11. Patient was informed to arrive 15 min prior to their scheduled appointment and bring in their medication bottles.

## 2018-01-25 ENCOUNTER — TELEPHONE (OUTPATIENT)
Dept: MEDICAL GROUP | Age: 73
End: 2018-01-25

## 2018-01-25 NOTE — TELEPHONE ENCOUNTER
PVP WITH OUTREACH  Future Appointments       Provider Department Center    2/1/2018 10:20 AM Hailey Bradshaw M.D.; [facility]  [facility] [address] [facility] [address] [place]          ANNUAL WELLNESS VISIT PRE-VISIT PLANNING     1.  Immunizations were updated in Epic using WebIZ?: Epic matches WebIZ       •  WebIZ Recommendations: Patient is up to date on all vaccines       •  Is patient due for Tdap? NO       •  Is patient due for Shingles? NO     2.  Specialty Comments was updated with diagnosis information provided by [facility]: YES In your medical judgement, are the following conditions valid-present for 2018? If so, would you please assess and document?     C50.412 malignant neoplasm of upper-outer quadrant of left female breast (Dr. Mg 03-) Please state active treatment and any mets.

## 2018-02-01 ENCOUNTER — OFFICE VISIT (OUTPATIENT)
Dept: MEDICAL GROUP | Age: 73
End: 2018-02-01
Payer: MEDICARE

## 2018-02-01 ENCOUNTER — HOSPITAL ENCOUNTER (OUTPATIENT)
Facility: MEDICAL CENTER | Age: 73
End: 2018-02-01
Attending: FAMILY MEDICINE
Payer: MEDICARE

## 2018-02-01 VITALS
OXYGEN SATURATION: 100 % | DIASTOLIC BLOOD PRESSURE: 60 MMHG | TEMPERATURE: 97.6 F | SYSTOLIC BLOOD PRESSURE: 106 MMHG | BODY MASS INDEX: 26.12 KG/M2 | WEIGHT: 153 LBS | HEART RATE: 105 BPM | HEIGHT: 64 IN

## 2018-02-01 DIAGNOSIS — D05.12 INTRADUCTAL CARCINOMA OF LEFT BREAST: ICD-10-CM

## 2018-02-01 DIAGNOSIS — E78.00 PURE HYPERCHOLESTEROLEMIA: ICD-10-CM

## 2018-02-01 DIAGNOSIS — R10.9 CHRONIC LEFT FLANK PAIN: ICD-10-CM

## 2018-02-01 DIAGNOSIS — I10 ESSENTIAL HYPERTENSION: ICD-10-CM

## 2018-02-01 DIAGNOSIS — M85.89 OSTEOPENIA OF MULTIPLE SITES: ICD-10-CM

## 2018-02-01 DIAGNOSIS — G89.29 CHRONIC LEFT FLANK PAIN: ICD-10-CM

## 2018-02-01 DIAGNOSIS — E55.9 VITAMIN D INSUFFICIENCY: ICD-10-CM

## 2018-02-01 DIAGNOSIS — Z11.59 NEED FOR HEPATITIS C SCREENING TEST: ICD-10-CM

## 2018-02-01 DIAGNOSIS — J45.30 MILD PERSISTENT ASTHMA WITHOUT COMPLICATION: ICD-10-CM

## 2018-02-01 DIAGNOSIS — Z12.11 COLON CANCER SCREENING: ICD-10-CM

## 2018-02-01 DIAGNOSIS — R73.01 IMPAIRED FASTING BLOOD SUGAR: ICD-10-CM

## 2018-02-01 DIAGNOSIS — Z00.00 MEDICARE ANNUAL WELLNESS VISIT, SUBSEQUENT: ICD-10-CM

## 2018-02-01 DIAGNOSIS — M17.0 PRIMARY OSTEOARTHRITIS OF BOTH KNEES: ICD-10-CM

## 2018-02-01 PROBLEM — C50.419 MALIGNANT NEOPLASM OF UPPER-OUTER QUADRANT OF FEMALE BREAST (HCC): Status: RESOLVED | Noted: 2017-03-17 | Resolved: 2018-02-01

## 2018-02-01 PROBLEM — C50.412 MALIGNANT NEOPLASM OF UPPER-OUTER QUADRANT OF LEFT FEMALE BREAST (HCC): Status: RESOLVED | Noted: 2017-03-06 | Resolved: 2018-02-01

## 2018-02-01 LAB
APPEARANCE UR: CLEAR
BACTERIA #/AREA URNS HPF: NEGATIVE /HPF
BILIRUB UR QL STRIP.AUTO: NEGATIVE
COLOR UR: YELLOW
CULTURE IF INDICATED INDCX: YES UA CULTURE
EPI CELLS #/AREA URNS HPF: ABNORMAL /HPF
GLUCOSE UR STRIP.AUTO-MCNC: NEGATIVE MG/DL
HYALINE CASTS #/AREA URNS LPF: ABNORMAL /LPF
KETONES UR STRIP.AUTO-MCNC: NEGATIVE MG/DL
LEUKOCYTE ESTERASE UR QL STRIP.AUTO: ABNORMAL
MICRO URNS: ABNORMAL
NITRITE UR QL STRIP.AUTO: NEGATIVE
PH UR STRIP.AUTO: 5 [PH]
PROT UR QL STRIP: NEGATIVE MG/DL
RBC # URNS HPF: ABNORMAL /HPF
RBC UR QL AUTO: NEGATIVE
SP GR UR STRIP.AUTO: 1.02
UROBILINOGEN UR STRIP.AUTO-MCNC: 0.2 MG/DL
WBC #/AREA URNS HPF: ABNORMAL /HPF

## 2018-02-01 PROCEDURE — 87086 URINE CULTURE/COLONY COUNT: CPT

## 2018-02-01 PROCEDURE — G0439 PPPS, SUBSEQ VISIT: HCPCS | Performed by: FAMILY MEDICINE

## 2018-02-01 PROCEDURE — 81001 URINALYSIS AUTO W/SCOPE: CPT

## 2018-02-01 RX ORDER — ALBUTEROL SULFATE 90 UG/1
2 AEROSOL, METERED RESPIRATORY (INHALATION) EVERY 6 HOURS PRN
Qty: 8.5 G | Refills: 1
Start: 2018-02-01 | End: 2019-02-22

## 2018-02-01 ASSESSMENT — PATIENT HEALTH QUESTIONNAIRE - PHQ9: CLINICAL INTERPRETATION OF PHQ2 SCORE: 0

## 2018-02-01 ASSESSMENT — PAIN SCALES - GENERAL: PAINLEVEL: 7=MODERATE-SEVERE PAIN

## 2018-02-01 NOTE — PROGRESS NOTES
Chief Complaint   Patient presents with   • Annual Wellness Visit     SCP       HPI:  Katie is a 72 y.o. here for Medicare Annual Wellness Visit    Pt is doing well, no specific concerns.     Patient Active Problem List    Diagnosis Date Noted   • Osteopenia of multiple sites 02/01/2018   • Intraductal carcinoma of left breast 03/08/2017   • Primary osteoarthritis of both knees 07/08/2016   • Essential hypertension 05/25/2016   • History of skin cancer of unknown type 05/25/2016   • Asthma, mild persistent 05/24/2016   • Hyperlipidemia 05/24/2016   • Plantar fasciitis 02/03/2006       Current Outpatient Prescriptions   Medication Sig Dispense Refill   • albuterol 108 (90 Base) MCG/ACT Aero Soln inhalation aerosol Inhale 2 Puffs by mouth every 6 hours as needed for Shortness of Breath. 8.5 g 1   • Omega-3 Fatty Acids (OMEGA 3 PO) Take  by mouth.     • ibuprofen (MOTRIN) 200 MG Tab Take 400 mg by mouth every 8 hours as needed.     • losartan (COZAAR) 25 MG Tab Take 1 Tab by mouth every day. 90 Tab 3   • Coenzyme Q10 (COQ-10 PO) Take 1 Tab by mouth every day.     • therapeutic multivitamin-minerals (THERAGRAN-M) Tab Take 1 Tab by mouth every day.     • cyanocobalamin (VITAMIN B-12) 500 MCG Tab Take 500 mcg by mouth every day.     • vitamin D (CHOLECALCIFEROL) 1000 UNIT Tab Take 1,000 Units by mouth every day.       No current facility-administered medications for this visit.         Patient is taking medications as noted in medication list.  Current supplements as per medication list.     Allergies: Patient has no known allergies.    Current social contact/activities: get together with friends     Is patient current with immunizations? Yes.    She  reports that she has never smoked. She has never used smokeless tobacco. She reports that she drinks alcohol. She reports that she does not use drugs.  Counseling given: No        DPA/Advanced directive: Patient has Advanced Directive on file.     ROS:    Gait: Uses no  assistive device   Ostomy: no   Other tubes: no   Amputations: no   Chronic oxygen use no   Last eye exam 01/2018   Wears hearing aids: no   : Denies any urinary leakage during the last 6 months    Screening:    Depression Screening    Little interest or pleasure in doing things?  0 - not at all  Feeling down, depressed, or hopeless? 0 - not at all  Patient Health Questionnaire Score: 0  No depressive symptoms identified      Screening for Cognitive Impairment    Three Minute Recall (apple, watch, gurinder)   3/3 Table leader sunrise  3/3  Draw clock face with all 12 numbers set to the hand to show 10 minutes past 11 o'clock  1 5/5  No cognitive concerns identified     Fall Risk Assessment    Has the patient had two or more falls in the last year or any fall with injury in the last year?  No  No fall risk identified     Safety Assessment    Throw rugs on floor.  Yes  Handrails on all stairs.  Yes  Good lighting in all hallways.  Yes  Difficulty hearing.  No  Patient counseled about all safety risks that were identified.    Functional Assessment ADLs    Are there any barriers preventing you from cooking for yourself or meeting nutritional needs?  No.    Are there any barriers preventing you from driving safely or obtaining transportation?  No.    Are there any barriers preventing you from using a telephone or calling for help?  No.    Are there any barriers preventing you from shopping?  No.    Are there any barriers preventing you from taking care of your own finances?  No.    Are there any barriers preventing you from managing your medications?  No.    Are you currently engaging any exercise or physical activity?  Yes.  gym    Health Maintenance Summary                COLONOSCOPY Overdue 2/10/1996     BONE DENSITY Overdue 2/10/2011     Annual Wellness Visit Next Due 3/9/2018      Done 3/8/2017 Visit Dx: Medicare annual wellness visit, initial    MAMMOGRAM Next Due 9/6/2018      Done 9/6/2017 MA-MAMMO DIAGNOSTIC  "BILAT W/TOMOSYNTHESIS W/CAD     Patient has more history with this topic...    IMM DTaP/Tdap/Td Vaccine Next Due 8/31/2025      Done 8/31/2015 Imm Admin: Tdap Vaccine          Patient Care Team:  Barbi Guzman M.D. as PCP - General (Family Medicine)  Katy Gastelum M.D. (Surgery)  [facility] as Consulting Physician  Osman Rhodes M.D. as Consulting Physician (Oncology)  Belkys Duke M.D. as Consulting Physician (Gynecology)    Social History   Substance Use Topics   • Smoking status: Never Smoker   • Smokeless tobacco: Never Used   • Alcohol use 0.0 oz/week      Comment: occasional     Family History   Problem Relation Age of Onset   • Cancer Mother      leukemia   • Heart Disease Mother      arrhythmia   • Cancer Father      lung   • Lung Disease Father    • No Known Problems Sister    • Diabetes Neg Hx      She  has a past medical history of Arthritis; Asthma; Breast cancer (CMS-HCC); Bunion; Cancer (CMS-HCC); Cancer (CMS-HCC) (2017); Hypertension (2017); Irritable bowel syndrome; Plantar fasciitis of right foot; and Prediabetes.   Past Surgical History:   Procedure Laterality Date   • MASTECTOMY Left 3/17/2017    Procedure: MASTECTOMY - PARTIAL, WIRE LOCALIZED;  Surgeon: Katy Gastelum M.D.;  Location: SURGERY [facility];  Service:    • NODE BIOPSY SENTINEL Left 3/17/2017    Procedure: NODE BIOPSY SENTINEL;  Surgeon: Katy Gastelum M.D.;  Location: [facility];  Service:    • OTHER      right ovary removed           Exam:     Blood pressure 106/60, pulse (!) 105, temperature 36.4 °C (97.6 °F), height 1.619 m (5' 3.75\"), weight 69.4 kg (153 lb), SpO2 100 %. Body mass index is 26.47 kg/m².    Hearing good.    Dentition good  Alert, oriented in no acute distress.  Eye contact is good, speech goal directed, affect calm      Assessment and Plan. The following treatment and monitoring plan is recommended:    1. Colon cancer screening  OCCULT BLOOD FECES IMMUNOASSAY (FIT)   2. " Intraductal carcinoma of left breast  F/u with oncology   3. Primary osteoarthritis of both knees  Regular exercises  Tylenol/Ibuprofen PRN for symptoms  Weight loss.    4. Need for hepatitis C screening test  HEP C VIRUS ANTIBODY   5. Essential hypertension  Chronic, stable, well controlled with Losartan, will continue.   COMP METABOLIC PANEL    MICROALBUMIN CREAT RATIO URINE   6. Pure hypercholesterolemia  Diet control, doing well, will continue.   LIPID PROFILE   7. Mild persistent asthma without complication  albuterol 108 (90 Base) MCG/ACT Aero Soln inhalation aerosol   8. Chronic left flank pain  Chronic intermittent left flank pain with unremarkable urine dip in the past, previous PCP did not address this problem, which bothers patient. She has hx of breast cancer and is concerned of metastasis. Repeat urine dip was negative for blood, positive for trace LE.   Urinalysis with culture if indicated.   US-RENAL (pt declines CT due to concern of radiation)   9. Impaired fasting blood sugar  HEMOGLOBIN A1C   10. Vitamin D insufficiency  VITAMIN D,25 HYDROXY   11. Osteopenia of multiple sites  DS-BONE DENSITY STUDY (DEXA)  Calcium, vitamin D  Weight bearing exercises.          Services suggested: No services needed at this time  Health Care Screening recommendations as per orders if indicated.  Referrals offered: PT/OT/Nutrition counseling/Behavioral Health/Smoking cessation as per orders if indicated.    Discussion today about general wellness and lifestyle habits:    · Prevent falls and reduce trip hazards; Cautioned about securing or removing rugs.  · Have a working fire alarm and carbon monoxide detector;   · Engage in regular physical activity and social activities       Follow-up: Return in about 6 months (around 8/1/2018) for Annual wellness visit.

## 2018-02-01 NOTE — LETTER
[facility]  Barbi Guzman M.D.  [address] [place]  [address]  [place] [address]  Fax: [phone]   Authorization for Release/Disclosure of   Protected Health Information   Name: CARLI MONROY : 1945 SSN: [id number]   Address: [address] [place] [address]   [place] [address] Phone:    [phone] (home)    I authorize the entity listed below to release/disclose the PHI below to:   [facility]/Barbi Guzman M.D. and Hailey Bradshaw M.D.   Provider or Entity Name:     Address   City, State, Zip   Phone:      Fax:     Reason for request: continuity of care   Information to be released:    [  ] LAST COLONOSCOPY,  including any PATH REPORT and follow-up  [  ] LAST FIT/COLOGUARD RESULT [  ] LAST DEXA  [  ] LAST MAMMOGRAM  [  ] LAST PAP  [  ] LAST LABS [  ] RETINA EXAM REPORT  [  ] IMMUNIZATION RECORDS  [  ] Release all info      [  ] Check here and initial the line next to each item to release ALL health information INCLUDING  _____ Care and treatment for drug and / or alcohol abuse  _____ HIV testing, infection status, or AIDS  _____ Genetic Testing    DATES OF SERVICE OR TIME PERIOD TO BE DISCLOSED: _____________  I understand and acknowledge that:  * This Authorization may be revoked at any time by you in writing, except if your health information has already been used or disclosed.  * Your health information that will be used or disclosed as a result of you signing this authorization could be re-disclosed by the recipient. If this occurs, your re-disclosed health information may no longer be protected by State or Federal laws.  * You may refuse to sign this Authorization. Your refusal will not affect your ability to obtain treatment.  * This Authorization becomes effective upon signing and will  on (date) __________.      If no date is indicated, this Authorization will  one (1) year from the signature date.    Name: Carli Monroy    Signature:   Date:     2018       PLEASE FAX REQUESTED RECORDS BACK TO:  [phone]

## 2018-02-02 LAB
AMBIGUOUS DTTM AMBI4: NORMAL
SIGNIFICANT IND 70042: NORMAL
SITE SITE: NORMAL
SOURCE SOURCE: NORMAL

## 2018-02-04 LAB
BACTERIA UR CULT: NORMAL
SIGNIFICANT IND 70042: NORMAL
SITE SITE: NORMAL
SOURCE SOURCE: NORMAL

## 2018-02-05 ENCOUNTER — OFFICE VISIT (OUTPATIENT)
Dept: MEDICAL GROUP | Age: 73
End: 2018-02-05
Payer: MEDICARE

## 2018-02-05 ENCOUNTER — HOSPITAL ENCOUNTER (OUTPATIENT)
Dept: RADIOLOGY | Facility: MEDICAL CENTER | Age: 73
End: 2018-02-05
Attending: INTERNAL MEDICINE
Payer: MEDICARE

## 2018-02-05 VITALS
TEMPERATURE: 99 F | HEART RATE: 91 BPM | DIASTOLIC BLOOD PRESSURE: 72 MMHG | BODY MASS INDEX: 26.4 KG/M2 | SYSTOLIC BLOOD PRESSURE: 106 MMHG | OXYGEN SATURATION: 94 % | WEIGHT: 154.6 LBS | HEIGHT: 64 IN

## 2018-02-05 DIAGNOSIS — E78.5 DYSLIPIDEMIA: ICD-10-CM

## 2018-02-05 DIAGNOSIS — R07.9 EXERTIONAL CHEST PAIN: ICD-10-CM

## 2018-02-05 DIAGNOSIS — J02.9 SORE THROAT: ICD-10-CM

## 2018-02-05 DIAGNOSIS — I10 ESSENTIAL HYPERTENSION: ICD-10-CM

## 2018-02-05 DIAGNOSIS — R05.9 COUGH: ICD-10-CM

## 2018-02-05 DIAGNOSIS — J20.9 ACUTE BRONCHITIS, UNSPECIFIED ORGANISM: ICD-10-CM

## 2018-02-05 PROBLEM — R31.21 ASYMPTOMATIC MICROSCOPIC HEMATURIA: Status: ACTIVE | Noted: 2018-02-05

## 2018-02-05 LAB
FLUAV+FLUBV AG SPEC QL IA: NEGATIVE
INT CON NEG: NEGATIVE
INT CON NEG: NEGATIVE
INT CON POS: POSITIVE
INT CON POS: POSITIVE
S PYO AG THROAT QL: NEGATIVE

## 2018-02-05 PROCEDURE — 71046 X-RAY EXAM CHEST 2 VIEWS: CPT

## 2018-02-05 PROCEDURE — 87804 INFLUENZA ASSAY W/OPTIC: CPT | Performed by: INTERNAL MEDICINE

## 2018-02-05 PROCEDURE — 99204 OFFICE O/P NEW MOD 45 MIN: CPT | Performed by: INTERNAL MEDICINE

## 2018-02-05 PROCEDURE — 87880 STREP A ASSAY W/OPTIC: CPT | Performed by: INTERNAL MEDICINE

## 2018-02-05 RX ORDER — AMOXICILLIN AND CLAVULANATE POTASSIUM 875; 125 MG/1; MG/1
1 TABLET, FILM COATED ORAL 2 TIMES DAILY
Qty: 30 TAB | Refills: 1 | Status: SHIPPED | OUTPATIENT
Start: 2018-02-05 | End: 2018-02-14

## 2018-02-05 RX ORDER — LOSARTAN POTASSIUM 25 MG/1
25 TABLET ORAL DAILY
Qty: 90 TAB | Refills: 4 | Status: SHIPPED | OUTPATIENT
Start: 2018-02-05 | End: 2019-02-14

## 2018-02-05 ASSESSMENT — ENCOUNTER SYMPTOMS
GASTROINTESTINAL NEGATIVE: 1
PSYCHIATRIC NEGATIVE: 1
COUGH: 1
NEUROLOGICAL NEGATIVE: 1
SHORTNESS OF BREATH: 0
SORE THROAT: 1
PALPITATIONS: 0
WHEEZING: 0
CHILLS: 0
EYES NEGATIVE: 1
SPUTUM PRODUCTION: 1
HEMOPTYSIS: 0
FEVER: 0
ORTHOPNEA: 0
MUSCULOSKELETAL NEGATIVE: 1

## 2018-02-06 NOTE — PROGRESS NOTES
"Subjective:      Katie Monroy is a 72 y.o. female who presents with Cough and Pharyngitis (x 3 days)  AND  The patient is here for followup of chronic medical problems listed below. The patient is compliant with medications and having no side effects from them. Denies chest pain, abdominal pain, dyspnea, myalgias, or cough.   Patient Active Problem List    Diagnosis Date Noted   • Dyslipidemia- mild no meds 02/05/2018   • Asymptomatic microscopic hematuria 02/05/2018   • Osteopenia of multiple sites 02/01/2018   • Intraductal carcinoma of left breast 03/08/2017   • Primary osteoarthritis of both knees 07/08/2016   • Essential hypertension 05/25/2016   • History of skin cancer of unknown type 05/25/2016   • Asthma, mild persistent 05/24/2016   • Hyperlipidemia 05/24/2016   • Plantar fasciitis 02/03/2006     AND C/O EXERTIONAL CHEST PRESSURE X 2 MOS          HPI    Review of Systems   Constitutional: Positive for malaise/fatigue. Negative for chills and fever.   HENT: Positive for congestion and sore throat.    Eyes: Negative.    Respiratory: Positive for cough and sputum production. Negative for hemoptysis, shortness of breath and wheezing.    Cardiovascular: Positive for chest pain. Negative for palpitations, orthopnea and leg swelling.   Gastrointestinal: Negative.    Genitourinary: Negative.    Musculoskeletal: Negative.    Skin: Negative.    Neurological: Negative.    Endo/Heme/Allergies: Negative.    Psychiatric/Behavioral: Negative.           Objective:     /72   Pulse 91   Temp 37.2 °C (99 °F)   Ht 1.619 m (5' 3.75\")   Wt 70.1 kg (154 lb 9.6 oz)   SpO2 94%   BMI 26.75 kg/m²      Physical Exam   Constitutional: She is oriented to person, place, and time. She appears well-developed and well-nourished. No distress.   HENT:   Head: Normocephalic and atraumatic.   Right Ear: External ear normal.   Left Ear: External ear normal.   Nose: Nose normal.   Mouth/Throat: Oropharynx is clear and moist. No " oropharyngeal exudate.   Eyes: Conjunctivae and EOM are normal. Pupils are equal, round, and reactive to light. Right eye exhibits no discharge. Left eye exhibits no discharge. No scleral icterus.   Neck: Normal range of motion. Neck supple. No JVD present. No tracheal deviation present. No thyromegaly present.   Cardiovascular: Normal rate, regular rhythm, normal heart sounds and intact distal pulses.  Exam reveals no gallop and no friction rub.    No murmur heard.  Pulmonary/Chest: Effort normal and breath sounds normal. No stridor. No respiratory distress. She has no wheezes. She has no rales. She exhibits no tenderness.   Abdominal: Soft. Bowel sounds are normal. She exhibits no distension and no mass. There is no tenderness. There is no rebound and no guarding.   Musculoskeletal: Normal range of motion. She exhibits no edema or tenderness.   Lymphadenopathy:     She has no cervical adenopathy.   Neurological: She is alert and oriented to person, place, and time. She has normal reflexes. She displays normal reflexes. No cranial nerve deficit. Sensory deficit: .GOO. She exhibits normal muscle tone. Coordination normal.   Skin: Skin is warm and dry. No rash noted. She is not diaphoretic. No erythema. No pallor.   Psychiatric: She has a normal mood and affect. Her behavior is normal. Judgment and thought content normal.   Vitals reviewed.         Hospital Outpatient Visit on 02/01/2018   Component Date Value   • Color 02/01/2018 Yellow    • Character 02/01/2018 Clear    • Specific Gravity 02/01/2018 1.024    • Ph 02/01/2018 5.0    • Glucose 02/01/2018 Negative    • Ketones 02/01/2018 Negative    • Protein 02/01/2018 Negative    • Bilirubin 02/01/2018 Negative    • Urobilinogen, Urine 02/01/2018 0.2    • Nitrite 02/01/2018 Negative    • Leukocyte Esterase 02/01/2018 Small*   • Occult Blood 02/01/2018 Negative    • Micro Urine Req 02/01/2018 Microscopic    • Culture Indicated 02/01/2018 Yes    • Significant Indicator  02/01/2018 .    • Source 02/01/2018 UR    • Ambiguous Date/Time 02/01/2018                      Value:This specimen was received from your office without a  collection date and/or time. Collection date and/or time  defaulted to receipt date and/or time.     • Significant Indicator 02/01/2018 NEG    • Source 02/01/2018 UR    • Urine Culture 02/01/2018 No growth at 48 hours    • WBC 02/01/2018 0-2    • RBC 02/01/2018 10-20*   • Bacteria 02/01/2018 Negative    • Epithelial Cells 02/01/2018 Few    • Hyaline Cast 02/01/2018 3-5*      No results found for: HBA1C  Lab Results   Component Value Date/Time    SODIUM 137 11/14/2017 12:29 PM    POTASSIUM 3.8 11/14/2017 12:29 PM    CHLORIDE 104 11/14/2017 12:29 PM    CO2 25 11/14/2017 12:29 PM    GLUCOSE 97 11/14/2017 12:29 PM    BUN 12 11/14/2017 12:29 PM    CREATININE 0.51 11/14/2017 12:29 PM    ALKPHOSPHAT 80 11/14/2017 12:29 PM    ASTSGOT 16 11/14/2017 12:29 PM    ALTSGPT 13 11/14/2017 12:29 PM    TBILIRUBIN 0.5 11/14/2017 12:29 PM     No results found for: INR  Lab Results   Component Value Date/Time    CHOLSTRLTOT 203 (H) 07/06/2017 09:39 AM     (H) 07/06/2017 09:39 AM    HDL 53 07/06/2017 09:39 AM    TRIGLYCERIDE 118 07/06/2017 09:39 AM       No results found for: TESTOSTERONE  No results found for: TSH  Lab Results   Component Value Date/Time    FREET4 1.04 07/06/2017 09:39 AM     No results found for: URICACID  No components found for: VITB12  No results found for: 25HYDROXY       Assessment/Plan:     1. Cough        - POCT Influenza A/B-negative  - DX-CHEST-2 VIEWS; Future    2. Sore throat     - POCT Rapid Strep A-negative       4. Essential hypertension        Under good control. Continue same regimen.- losartan (COZAAR) 25 MG Tab; Take 1 Tab by mouth every day.  Dispense: 90 Tab; Refill: 4    5. Dyslipidemia- mild no meds      Under good control. Continue same regimen.-    6. Acute bronchitis, unspecified organism      - amoxicillin-clavulanate (AUGMENTIN)  875-125 MG Tab; Take 1 Tab by mouth 2 times a day.  Dispense: 30 Tab; Refill: 1    7. Exertional chest pain      - NM-CARDIAC STRESS TEST; Future    45 minute face-to-face encounter took place today.  More than half of this time was spent in the coordination of care of the above problems, as well as counseling.

## 2018-02-07 ENCOUNTER — HOSPITAL ENCOUNTER (OUTPATIENT)
Dept: RADIOLOGY | Facility: MEDICAL CENTER | Age: 73
End: 2018-02-07
Attending: FAMILY MEDICINE
Payer: MEDICARE

## 2018-02-07 DIAGNOSIS — M85.89 OSTEOPENIA OF MULTIPLE SITES: ICD-10-CM

## 2018-02-07 PROCEDURE — 76775 US EXAM ABDO BACK WALL LIM: CPT

## 2018-02-07 PROCEDURE — 77080 DXA BONE DENSITY AXIAL: CPT

## 2018-02-09 ENCOUNTER — OFFICE VISIT (OUTPATIENT)
Dept: URGENT CARE | Facility: CLINIC | Age: 73
End: 2018-02-09
Payer: MEDICARE

## 2018-02-09 VITALS
OXYGEN SATURATION: 97 % | SYSTOLIC BLOOD PRESSURE: 120 MMHG | HEART RATE: 86 BPM | RESPIRATION RATE: 14 BRPM | BODY MASS INDEX: 26.46 KG/M2 | DIASTOLIC BLOOD PRESSURE: 88 MMHG | WEIGHT: 155 LBS | TEMPERATURE: 98.8 F | HEIGHT: 64 IN

## 2018-02-09 DIAGNOSIS — R31.21 ASYMPTOMATIC MICROSCOPIC HEMATURIA: ICD-10-CM

## 2018-02-09 DIAGNOSIS — J22 LRTI (LOWER RESPIRATORY TRACT INFECTION): ICD-10-CM

## 2018-02-09 PROCEDURE — 99214 OFFICE O/P EST MOD 30 MIN: CPT | Performed by: PHYSICIAN ASSISTANT

## 2018-02-09 RX ORDER — METHYLPREDNISOLONE 4 MG/1
TABLET ORAL
Qty: 21 TAB | Refills: 0 | Status: SHIPPED | OUTPATIENT
Start: 2018-02-09 | End: 2018-02-14

## 2018-02-09 RX ORDER — DOXYCYCLINE HYCLATE 100 MG
100 TABLET ORAL 2 TIMES DAILY
Qty: 14 TAB | Refills: 0 | Status: SHIPPED | OUTPATIENT
Start: 2018-02-09 | End: 2018-02-16

## 2018-02-09 ASSESSMENT — ENCOUNTER SYMPTOMS
DIARRHEA: 0
COUGH: 1
CHILLS: 0
SHORTNESS OF BREATH: 1
SPUTUM PRODUCTION: 1
FOCAL WEAKNESS: 0
TINGLING: 0
FEVER: 0
ABDOMINAL PAIN: 0
SINUS PAIN: 0
WHEEZING: 1
SORE THROAT: 0
PALPITATIONS: 0
RHINORRHEA: 0
VOMITING: 0
HEADACHES: 0
SENSORY CHANGE: 0
NAUSEA: 0
MYALGIAS: 0

## 2018-02-09 ASSESSMENT — COPD QUESTIONNAIRES: COPD: 0

## 2018-02-09 NOTE — PROGRESS NOTES
The result was discussed with patient via My Chart.    Candi Schofield M.D., covering for Dr. Bradshaw

## 2018-02-10 ENCOUNTER — OFFICE VISIT (OUTPATIENT)
Dept: URGENT CARE | Facility: CLINIC | Age: 73
End: 2018-02-10
Payer: MEDICARE

## 2018-02-10 VITALS
TEMPERATURE: 97 F | DIASTOLIC BLOOD PRESSURE: 84 MMHG | OXYGEN SATURATION: 98 % | BODY MASS INDEX: 25.61 KG/M2 | WEIGHT: 150 LBS | HEIGHT: 64 IN | SYSTOLIC BLOOD PRESSURE: 160 MMHG | HEART RATE: 97 BPM

## 2018-02-10 DIAGNOSIS — I10 ESSENTIAL HYPERTENSION: ICD-10-CM

## 2018-02-10 DIAGNOSIS — J98.8 RTI (RESPIRATORY TRACT INFECTION): ICD-10-CM

## 2018-02-10 PROCEDURE — 99214 OFFICE O/P EST MOD 30 MIN: CPT | Performed by: FAMILY MEDICINE

## 2018-02-10 RX ORDER — PROMETHAZINE HYDROCHLORIDE AND CODEINE PHOSPHATE 6.25; 1 MG/5ML; MG/5ML
SYRUP ORAL
COMMUNITY
Start: 2017-11-18 | End: 2019-02-22

## 2018-02-10 ASSESSMENT — ENCOUNTER SYMPTOMS
DIZZINESS: 0
SEIZURES: 0
FOCAL WEAKNESS: 0
FEVER: 0
CHILLS: 0
COUGH: 1
SPUTUM PRODUCTION: 0
HEADACHES: 1
LOSS OF CONSCIOUSNESS: 0

## 2018-02-10 NOTE — PROGRESS NOTES
Subjective:      Katie Monroy is a 72 y.o. female who presents with Cough (seen on Monday and given meds, not getting better, seen in Nov. and  gave meds that work: doxycycline, methylprednisolone)            Cough   This is a new problem. Episode onset: 1 week. The problem has been unchanged. The problem occurs constantly. The cough is productive of sputum. Associated symptoms include shortness of breath and wheezing. Pertinent negatives include no chest pain, chills, ear congestion, ear pain, fever, headaches, myalgias, nasal congestion, postnasal drip, rash, rhinorrhea or sore throat. The symptoms are aggravated by lying down. She has tried a beta-agonist inhaler (augmentin prescribed by PCP) for the symptoms. The treatment provided no relief. Her past medical history is significant for asthma and bronchitis. There is no history of COPD or pneumonia.       Past Medical History:   Diagnosis Date   • Arthritis     osteo   • Asthma     does not use inhalers   • Breast cancer (CMS-HCC)    • Bunion     both feet   • Cancer (CMS-HCC)     skin   • Cancer (CMS-HCC) 2017    breast   • Hypertension 2017    pt states well controlled on meds   • Irritable bowel syndrome    • Plantar fasciitis of right foot    • Prediabetes      Past Surgical History:   Procedure Laterality Date   • MASTECTOMY Left 3/17/2017    Procedure: MASTECTOMY - PARTIAL, WIRE LOCALIZED;  Surgeon: Katy Gastelum M.D.;  Location: SURGERY [facility];  Service:    • NODE BIOPSY SENTINEL Left 3/17/2017    Procedure: NODE BIOPSY SENTINEL;  Surgeon: Katy Gastelum M.D.;  Location: SURGERY [facility];  Service:    • OTHER      right ovary removed       Family History   Problem Relation Age of Onset   • Cancer Mother      leukemia   • Heart Disease Mother      arrhythmia   • Cancer Father      lung   • Lung Disease Father    • No Known Problems Sister    • Diabetes Neg Hx        No Known Allergies    Medications, Allergies, and current  "problem list reviewed today in Epic    Review of Systems   Constitutional: Positive for malaise/fatigue. Negative for chills and fever.   HENT: Negative for congestion, ear discharge, ear pain, postnasal drip, rhinorrhea, sinus pain and sore throat.    Respiratory: Positive for cough, sputum production, shortness of breath and wheezing.    Cardiovascular: Negative for chest pain, palpitations and leg swelling.   Gastrointestinal: Negative for abdominal pain, diarrhea, nausea and vomiting.   Musculoskeletal: Negative for myalgias.   Skin: Negative for rash.   Neurological: Negative for tingling, sensory change, focal weakness and headaches.     All other systems reviewed and are negative.        Objective:     /88   Pulse 86   Temp 37.1 °C (98.8 °F)   Resp 14   Ht 1.619 m (5' 3.75\")   Wt 70.3 kg (155 lb)   SpO2 97%   BMI 26.81 kg/m²      Physical Exam   Constitutional: She is oriented to person, place, and time. She appears well-developed and well-nourished. No distress.   HENT:   Head: Normocephalic and atraumatic.   Right Ear: Tympanic membrane, external ear and ear canal normal.   Left Ear: Tympanic membrane, external ear and ear canal normal.   Nose: Nose normal.   Mouth/Throat: Uvula is midline, oropharynx is clear and moist and mucous membranes are normal. No oropharyngeal exudate.   Neck: Neck supple.   Cardiovascular: Normal rate, regular rhythm and normal heart sounds.  Exam reveals no gallop and no friction rub.    No murmur heard.  Pulmonary/Chest: Effort normal. No respiratory distress. She has wheezes (slight diffuse wheezes). She exhibits no tenderness.   Lymphadenopathy:     She has no cervical adenopathy.   Neurological: She is alert and oriented to person, place, and time. No cranial nerve deficit.   Skin: Skin is warm and dry. No rash noted.   Psychiatric: She has a normal mood and affect. Her behavior is normal. Judgment and thought content normal.               Assessment/Plan:     1. " LRTI (lower respiratory tract infection)    - doxycycline (VIBRAMYCIN) 100 MG Tab; Take 1 Tab by mouth 2 times a day for 7 days.  Dispense: 14 Tab; Refill: 0  - MethylPREDNISolone (MEDROL DOSEPAK) 4 MG Tablet Therapy Pack; Take as directed on package. 1 pack.  Dispense: 21 Tab; Refill: 0     Differential diagnoses, Supportive care, and indications for immediate follow-up discussed with patient.   Instructed to return to clinic or nearest emergency department for any change in condition, further concerns, or worsening of symptoms.    The patient demonstrated a good understanding and agreed with the treatment plan.    Mony Berumen P.A.-C.

## 2018-02-11 NOTE — PROGRESS NOTES
Subjective:      Katie Monroy is a 73 y.o. female who presents with Medication Reaction (Pt complains of blood pressure spike after taking MethylPREDNISlone. Took first dose of 6 pills at the same time.)    Chief Complaint   Patient presents with   • Medication Reaction     Pt complains of blood pressure spike after taking MethylPREDNISlone. Took first dose of 6 pills at the same time.        - This is a very pleasant 73 y.o. female with complaints of seen yesterday. Took all her 1st day dose of medrolpak at once last night and has had a headache since and anxious and BP was high at home 190/100. No cp/sob. She took an extra 1/2 of bp med.           ALLERGIES:  Patient has no known allergies.     PMH:  Past Medical History:   Diagnosis Date   • Arthritis     osteo   • Asthma     does not use inhalers   • Breast cancer (CMS-HCC)    • Bunion     both feet   • Cancer (CMS-HCC)     skin   • Cancer (CMS-HCC) 2017    breast   • Hypertension 2017    pt states well controlled on meds   • Irritable bowel syndrome    • Plantar fasciitis of right foot    • Prediabetes         MEDS:    Current Outpatient Prescriptions:   •  promethazine-codeine (PHENERGAN-CODEINE) 6.25-10 MG/5ML Syrup, , Disp: , Rfl:   •  ALPRAZOLAM PO, Take  by mouth., Disp: , Rfl:   •  MethylPREDNISolone (MEDROL DOSEPAK) 4 MG Tablet Therapy Pack, Take as directed on package. 1 pack., Disp: 21 Tab, Rfl: 0  •  losartan (COZAAR) 25 MG Tab, Take 1 Tab by mouth every day., Disp: 90 Tab, Rfl: 4  •  amoxicillin-clavulanate (AUGMENTIN) 875-125 MG Tab, Take 1 Tab by mouth 2 times a day., Disp: 30 Tab, Rfl: 1  •  albuterol 108 (90 Base) MCG/ACT Aero Soln inhalation aerosol, Inhale 2 Puffs by mouth every 6 hours as needed for Shortness of Breath., Disp: 8.5 g, Rfl: 1  •  ibuprofen (MOTRIN) 200 MG Tab, Take 400 mg by mouth every 8 hours as needed., Disp: , Rfl:   •  Coenzyme Q10 (COQ-10 PO), Take 1 Tab by mouth every day., Disp: , Rfl:   •  doxycycline (VIBRAMYCIN)  "100 MG Tab, Take 1 Tab by mouth 2 times a day for 7 days., Disp: 14 Tab, Rfl: 0  •  Omega-3 Fatty Acids (OMEGA 3 PO), Take  by mouth., Disp: , Rfl:   •  therapeutic multivitamin-minerals (THERAGRAN-M) Tab, Take 1 Tab by mouth every day., Disp: , Rfl:   •  cyanocobalamin (VITAMIN B-12) 500 MCG Tab, Take 500 mcg by mouth every day., Disp: , Rfl:   •  vitamin D (CHOLECALCIFEROL) 1000 UNIT Tab, Take 1,000 Units by mouth every day., Disp: , Rfl:     ** I have documented what I find to be significant in regards to past medical, social, family and surgical history  in my HPI or under PMH/PSH/FH review section, otherwise it is contributory **           HPI    Review of Systems   Constitutional: Negative for chills and fever.   Respiratory: Positive for cough. Negative for sputum production.    Neurological: Positive for headaches. Negative for dizziness, focal weakness, seizures and loss of consciousness.          Objective:     /84   Pulse 97   Temp 36.1 °C (97 °F)   Ht 1.626 m (5' 4\")   Wt 68 kg (150 lb)   SpO2 98%   Breastfeeding? No   BMI 25.75 kg/m²      Physical Exam   Constitutional: She appears well-developed. No distress.   HENT:   Head: Normocephalic and atraumatic.   Mouth/Throat: Oropharynx is clear and moist.   Eyes: Conjunctivae are normal.   Neck: Neck supple.   Cardiovascular: Regular rhythm.    No murmur heard.  Pulmonary/Chest: Effort normal and breath sounds normal. No respiratory distress.   Neurological: She is alert. No cranial nerve deficit. She exhibits normal muscle tone.   Skin: Skin is warm and dry.   Psychiatric: She has a normal mood and affect. Judgment normal.   Nursing note and vitals reviewed.              Assessment/Plan:         1. RTI (respiratory tract infection)     2. Essential hypertension         Discussed /c rest of medrol matilde today and tomorrow and Monday just take 3 tabs in am if BP return to normal range. If BP still increasing or symptoms worse go to ER      Dx & " d/c instructions discussed w/ patient and/or family members. Follow up w/ Prvt Dr or here in 3-4 days if not getting better, sooner if needed,  ER if worse and UC/PCP unavailable.        Possible side effects (i.e. Rash, GI upset/constipation, sedation, elevation of BP or sugars) of any medications given discussed.

## 2018-02-14 ENCOUNTER — OFFICE VISIT (OUTPATIENT)
Dept: MEDICAL GROUP | Age: 73
End: 2018-02-14
Payer: MEDICARE

## 2018-02-14 VITALS
OXYGEN SATURATION: 95 % | WEIGHT: 155.4 LBS | BODY MASS INDEX: 26.53 KG/M2 | TEMPERATURE: 98.6 F | HEIGHT: 64 IN | HEART RATE: 96 BPM | DIASTOLIC BLOOD PRESSURE: 80 MMHG | SYSTOLIC BLOOD PRESSURE: 134 MMHG

## 2018-02-14 DIAGNOSIS — J00 ACUTE NASOPHARYNGITIS: ICD-10-CM

## 2018-02-14 DIAGNOSIS — I10 ESSENTIAL HYPERTENSION: ICD-10-CM

## 2018-02-14 DIAGNOSIS — F41.9 ANXIETY: ICD-10-CM

## 2018-02-14 PROCEDURE — 99214 OFFICE O/P EST MOD 30 MIN: CPT | Performed by: INTERNAL MEDICINE

## 2018-02-14 RX ORDER — FLUTICASONE PROPIONATE 50 MCG
1 SPRAY, SUSPENSION (ML) NASAL DAILY
COMMUNITY
End: 2019-02-22

## 2018-02-14 ASSESSMENT — PAIN SCALES - GENERAL: PAINLEVEL: NO PAIN

## 2018-02-15 NOTE — PROGRESS NOTES
Subjective:   Katie Monroy is a 73 y.o. female here today for evaluation and management of:      Acute nasopharyngitis  Patient is a regular patient of Dr. Bradshaw. Patient was seen in clinic with one of our provider on 2/5/18 for sore throat and cough. She was treated with Augmentin, albuterol inhaler and Medrol Dosepak for acute bronchitis. Her chest x-ray on 2/5/18 was negative for pneumonia. She stated that she only took Medrol Dosepak for 2 days and she could not tolerate the side effects from taking it as her blood pressure increased to 190/90 in second days of Medrol Dosepak. She stopped taking Medrol Dosepak. She also could not tolerate Augmentin as medication caused her stomach upset and nausea. She stopped taking Augmentin after one or 2 doses. Her symptoms did not improve. So she went to urgent care on 2/9/18 and she was treated with doxycycline for lower respiratory tract infection. She tolerates doxycycline and she is still taking it twice a day. She also tried Flonase nasal spray, old prescription Phenergan-codeine at night. She stated that her symptoms gradually improved.  She still has productive cough, runny nose and mild sinus congestion.    Essential hypertension  Patient is prescribed to take losartan 25 mg daily. However, patient adjust the dose of medication by herself. She only took half tablet a losartan last night as she noticed her blood pressure was low. Her blood pressure in clinic today was 134/80 and recheck blood pressure by myself, which is 130/80. Patient stated that her blood pressure was high up to 190/90 when she was taking Medrol dose pack. She is very concerning of side effects from taking Medrol Dosepak. I advised patient to stop taking Medrol Dosepak and advised her to check blood pressure regularly. I also discussed with her to take losartan 25 mg daily, but she can decrease the dose of losartan to half tablet if her blood pressure is less than 110/60. Patient understands and  agrees with the plan. She denied chest pain, shortness of breath or palpitation or dizzy.    Anxiety  Patient stated that she has anxiety once in a while and she needs to take Xanax as needed. She wants to know that she is safe to take Xanax when she is having respiratory tract infection and high blood pressure. I have a long discussion with patient to try to limit use of benzodiazepine due to potential side effects and dizziness. I do not recommend her to use benzodiazepine when she has bronchitis or asthma attack. Patient and  agree with the plan. I advised patient to follow with PCP, Dr. Bradshaw to discuss treatment if she has anxiety persistently.         Current medicines (including changes today)  Current Outpatient Prescriptions   Medication Sig Dispense Refill   • fluticasone (FLONASE) 50 MCG/ACT nasal spray Spray 1 Spray in nose every day.     • promethazine-codeine (PHENERGAN-CODEINE) 6.25-10 MG/5ML Syrup      • ALPRAZOLAM PO Take  by mouth.     • doxycycline (VIBRAMYCIN) 100 MG Tab Take 1 Tab by mouth 2 times a day for 7 days. 14 Tab 0   • losartan (COZAAR) 25 MG Tab Take 1 Tab by mouth every day. 90 Tab 4   • albuterol 108 (90 Base) MCG/ACT Aero Soln inhalation aerosol Inhale 2 Puffs by mouth every 6 hours as needed for Shortness of Breath. 8.5 g 1   • Omega-3 Fatty Acids (OMEGA 3 PO) Take  by mouth.     • ibuprofen (MOTRIN) 200 MG Tab Take 400 mg by mouth every 8 hours as needed.     • Coenzyme Q10 (COQ-10 PO) Take 1 Tab by mouth every day.     • therapeutic multivitamin-minerals (THERAGRAN-M) Tab Take 1 Tab by mouth every day.     • cyanocobalamin (VITAMIN B-12) 500 MCG Tab Take 500 mcg by mouth every day.     • vitamin D (CHOLECALCIFEROL) 1000 UNIT Tab Take 1,000 Units by mouth every day.       No current facility-administered medications for this visit.      She  has a past medical history of Arthritis; Asthma; Breast cancer (CMS-[facility]); Bunion; Cancer (CMS-[facility]); Cancer (CMS-[facility]) (2017); Hypertension  "(2017); Irritable bowel syndrome; Plantar fasciitis of right foot; and Prediabetes.    ROS   No chest pain, no shortness of breath, no abdominal pain       Objective:     Blood pressure 134/80, pulse 96, temperature 37 °C (98.6 °F), height 1.626 m (5' 4\"), weight 70.5 kg (155 lb 6.4 oz), SpO2 95 %, not currently breastfeeding. Body mass index is 26.67 kg/m².   Physical Exam:  General: Alert, oriented and no acute distress.  Eye contact is good, speech goal directed, affect calm  HEENT: conjunctiva non-injected, sclera non-icteric. Mild erythema and watery discharge on both nasal cavities.  Oral mucous membranes pink and moist with no lesions.  Pinna normal. TM pearly gray.   Neck No supraclavicular, submandibular, submental lymphadenopathy or masses in the neck or supraclavicular regions.  Lungs: Normal respiratory effort, no wheezing or rhonchi on exam.  CV: regular rate and rhythm. No murmurs.   Abdomen: soft, non distended, nontender, Bowel sound normal.  Ext: no edema, color normal, vascularity normal, temperature normal        Assessment and Plan:   The following treatment plan was discussed     1. Essential hypertension  - We discussed to keep losartan 25 mg daily and monitor blood pressure regularly. She can cut down the dose of losartan to half if her blood pressure consistently lower than 110/60.  - She does not have any chest pain or shortness of breath or dizziness or headache. Her blood pressure is 134/80 in clinic which is acceptable range.    2. Acute nasopharyngitis  - Discussed at length to rinse sinuses with over the counter nasal wash or Netti pot once or twice a day and then use flonase nasal spray once or twice a day after rinsing sinuses  - Advised to try nasal steam therapy.   - Discontinue Augmentin and Medrol Dosepak due to side effects as mentioned in history of present illness.  - Discussed to continue doxycycline 100 mg twice a day as prescribed by urgent care and advised to take probiotic " once a day.  - Recommend to gargle her throat with warm salt water to 3 times a day and drink more water.  - Recommend to take Tylenol as needed for sore throat. Discussed to avoid ibuprofen or any NSAIDs due to risks of high blood pressure.  - Advised patient to continue albuterol inhaler as needed.    3. Anxiety  - I discussed with patient not to take Xanax a lot or any benzodiazepine when she has asthma attack of bronchitis or respiratory tract infection.  - We discussed treating exercise, meditation, relaxation technique to cope stress.  - Patient is advised to follow with PCP to discuss to take SSRI or SSRI if she has multiple anxiety attack.    Face-to-face time spent 30 minutes with patient and more than half of that time spent for counseling and cooperating of care for medical problems listed above.       Followup: Return if symptoms worsen or fail to improve. I advised patient to return to clinic in 2-4 weeks. Patient declined it. She would like to keep her regular follow-up appointment on 7/2/18.      Please note that this dictation was created using voice recognition software. I have made every reasonable attempt to correct obvious errors, but I expect that there may have unintended errors in text, spelling, punctuation, or grammar that I did not discover.

## 2018-02-15 NOTE — ASSESSMENT & PLAN NOTE
Patient is prescribed to take losartan 25 mg daily. However, patient adjust the dose of medication by herself. She only took half tablet a losartan last night as she noticed her blood pressure was low. Her blood pressure in clinic today was 134/80 and recheck blood pressure by myself, which is 130/80. Patient stated that her blood pressure was high up to 190/90 when she was taking Medrol dose pack. She is very concerning of side effects from taking Medrol Dosepak. I advised patient to stop taking Medrol Dosepak and advised her to check blood pressure regularly. I also discussed with her to take losartan 25 mg daily, but she can decrease the dose of losartan to half tablet if her blood pressure is less than 110/60. Patient understands and agrees with the plan. She denied chest pain, shortness of breath or palpitation or dizzy.

## 2018-02-15 NOTE — ASSESSMENT & PLAN NOTE
Patient stated that she has anxiety once in a while and she needs to take Xanax as needed. She wants to know that she is safe to take Xanax when she is having respiratory tract infection and high blood pressure. I have a long discussion with patient to try to limit use of benzodiazepine due to potential side effects and dizziness. I do not recommend her to use benzodiazepine when she has bronchitis or asthma attack. Patient and  agree with the plan. I advised patient to follow with PCP, Dr. Bradshaw to discuss treatment if she has anxiety persistently.

## 2018-02-15 NOTE — ASSESSMENT & PLAN NOTE
Patient is a regular patient of Dr. Bradshaw. Patient was seen in clinic with one of our provider on 2/5/18 for sore throat and cough. She was treated with Augmentin, albuterol inhaler and Medrol Dosepak for acute bronchitis. Her chest x-ray on 2/5/18 was negative for pneumonia. She stated that she only took Medrol Dosepak for 2 days and she could not tolerate the side effects from taking it as her blood pressure increased to 190/90 in second days of Medrol Dosepak. She stopped taking Medrol Dosepak. She also could not tolerate Augmentin as medication caused her stomach upset and nausea. She stopped taking Augmentin after one or 2 doses. Her symptoms did not improve. So she went to urgent care on 2/9/18 and she was treated with doxycycline for lower respiratory tract infection. She tolerates doxycycline and she is still taking it twice a day. She also tried Flonase nasal spray, old prescription Phenergan-codeine at night. She stated that her symptoms gradually improved.  She still has productive cough, runny nose and mild sinus congestion.

## 2018-02-20 ENCOUNTER — APPOINTMENT (OUTPATIENT)
Dept: RADIOLOGY | Facility: MEDICAL CENTER | Age: 73
End: 2018-02-20
Attending: INTERNAL MEDICINE
Payer: MEDICARE

## 2018-02-22 ENCOUNTER — HOSPITAL ENCOUNTER (OUTPATIENT)
Dept: RADIOLOGY | Facility: MEDICAL CENTER | Age: 73
End: 2018-02-22
Attending: INTERNAL MEDICINE
Payer: MEDICARE

## 2018-02-22 ENCOUNTER — HOSPITAL ENCOUNTER (OUTPATIENT)
Dept: LAB | Facility: MEDICAL CENTER | Age: 73
End: 2018-02-22
Attending: INTERNAL MEDICINE
Payer: MEDICARE

## 2018-02-22 DIAGNOSIS — C50.412 MALIGNANT NEOPLASM OF UPPER-OUTER QUADRANT OF LEFT FEMALE BREAST, UNSPECIFIED ESTROGEN RECEPTOR STATUS (HCC): ICD-10-CM

## 2018-02-22 DIAGNOSIS — R05.9 COUGH: ICD-10-CM

## 2018-02-22 LAB
BASOPHILS # BLD AUTO: 0.4 % (ref 0–1.8)
BASOPHILS # BLD: 0.03 K/UL (ref 0–0.12)
EOSINOPHIL # BLD AUTO: 0.19 K/UL (ref 0–0.51)
EOSINOPHIL NFR BLD: 2.2 % (ref 0–6.9)
ERYTHROCYTE [DISTWIDTH] IN BLOOD BY AUTOMATED COUNT: 43.9 FL (ref 35.9–50)
HCT VFR BLD AUTO: 43.5 % (ref 37–47)
HGB BLD-MCNC: 13.9 G/DL (ref 12–16)
IMM GRANULOCYTES # BLD AUTO: 0.06 K/UL (ref 0–0.11)
IMM GRANULOCYTES NFR BLD AUTO: 0.7 % (ref 0–0.9)
LYMPHOCYTES # BLD AUTO: 1.65 K/UL (ref 1–4.8)
LYMPHOCYTES NFR BLD: 19.3 % (ref 22–41)
MCH RBC QN AUTO: 29 PG (ref 27–33)
MCHC RBC AUTO-ENTMCNC: 32 G/DL (ref 33.6–35)
MCV RBC AUTO: 90.8 FL (ref 81.4–97.8)
MONOCYTES # BLD AUTO: 0.63 K/UL (ref 0–0.85)
MONOCYTES NFR BLD AUTO: 7.4 % (ref 0–13.4)
NEUTROPHILS # BLD AUTO: 5.97 K/UL (ref 2–7.15)
NEUTROPHILS NFR BLD: 70 % (ref 44–72)
NRBC # BLD AUTO: 0 K/UL
NRBC BLD-RTO: 0 /100 WBC
PLATELET # BLD AUTO: 211 K/UL (ref 164–446)
PMV BLD AUTO: 9.8 FL (ref 9–12.9)
RBC # BLD AUTO: 4.79 M/UL (ref 4.2–5.4)
WBC # BLD AUTO: 8.5 K/UL (ref 4.8–10.8)

## 2018-02-22 PROCEDURE — 36415 COLL VENOUS BLD VENIPUNCTURE: CPT

## 2018-02-22 PROCEDURE — 85025 COMPLETE CBC W/AUTO DIFF WBC: CPT

## 2018-02-22 PROCEDURE — 71046 X-RAY EXAM CHEST 2 VIEWS: CPT

## 2018-02-24 ENCOUNTER — HOSPITAL ENCOUNTER (OUTPATIENT)
Facility: MEDICAL CENTER | Age: 73
End: 2018-02-24
Attending: FAMILY MEDICINE
Payer: MEDICARE

## 2018-02-24 PROCEDURE — 82274 ASSAY TEST FOR BLOOD FECAL: CPT

## 2018-02-27 DIAGNOSIS — Z12.11 COLON CANCER SCREENING: ICD-10-CM

## 2018-02-27 LAB — AMBIGUOUS DTTM AMBI4: NORMAL

## 2018-02-28 LAB — HEMOCCULT STL QL IA: NEGATIVE

## 2018-03-14 ENCOUNTER — HOSPITAL ENCOUNTER (OUTPATIENT)
Facility: MEDICAL CENTER | Age: 73
End: 2018-03-14
Attending: OBSTETRICS & GYNECOLOGY
Payer: MEDICARE

## 2018-03-14 PROCEDURE — 88175 CYTOPATH C/V AUTO FLUID REDO: CPT

## 2018-03-15 LAB — CYTOLOGY REG CYTOL: NORMAL

## 2018-03-28 ENCOUNTER — TELEPHONE (OUTPATIENT)
Dept: RADIOLOGY | Facility: MEDICAL CENTER | Age: 73
End: 2018-03-28

## 2018-04-03 ENCOUNTER — TELEPHONE (OUTPATIENT)
Dept: RADIOLOGY | Facility: MEDICAL CENTER | Age: 73
End: 2018-04-03

## 2018-04-04 ENCOUNTER — HOSPITAL ENCOUNTER (OUTPATIENT)
Dept: RADIOLOGY | Facility: MEDICAL CENTER | Age: 73
End: 2018-04-04
Attending: SURGERY
Payer: MEDICARE

## 2018-04-04 DIAGNOSIS — C50.419 MALIGNANT NEOPLASM OF UPPER-OUTER QUADRANT OF FEMALE BREAST, UNSPECIFIED ESTROGEN RECEPTOR STATUS, UNSPECIFIED LATERALITY (HCC): ICD-10-CM

## 2018-04-04 PROCEDURE — G0279 TOMOSYNTHESIS, MAMMO: HCPCS | Mod: LT

## 2018-04-18 ENCOUNTER — APPOINTMENT (RX ONLY)
Dept: URBAN - METROPOLITAN AREA CLINIC 4 | Facility: CLINIC | Age: 73
Setting detail: DERMATOLOGY
End: 2018-04-18

## 2018-04-18 DIAGNOSIS — L82.1 OTHER SEBORRHEIC KERATOSIS: ICD-10-CM

## 2018-04-18 DIAGNOSIS — D18.0 HEMANGIOMA: ICD-10-CM

## 2018-04-18 DIAGNOSIS — D22 MELANOCYTIC NEVI: ICD-10-CM

## 2018-04-18 DIAGNOSIS — L81.4 OTHER MELANIN HYPERPIGMENTATION: ICD-10-CM

## 2018-04-18 PROBLEM — Z85.828 PERSONAL HISTORY OF OTHER MALIGNANT NEOPLASM OF SKIN: Status: ACTIVE | Noted: 2018-04-18

## 2018-04-18 PROBLEM — D22.9 MELANOCYTIC NEVI, UNSPECIFIED: Status: ACTIVE | Noted: 2018-04-18

## 2018-04-18 PROBLEM — D18.01 HEMANGIOMA OF SKIN AND SUBCUTANEOUS TISSUE: Status: ACTIVE | Noted: 2018-04-18

## 2018-04-18 PROCEDURE — ? COUNSELING

## 2018-04-18 PROCEDURE — 99213 OFFICE O/P EST LOW 20 MIN: CPT

## 2018-05-02 PROBLEM — Z85.3 PERSONAL HISTORY OF MALIGNANT NEOPLASM OF BREAST: Status: ACTIVE | Noted: 2017-03-08

## 2018-05-18 ENCOUNTER — HOSPITAL ENCOUNTER (OUTPATIENT)
Dept: LAB | Facility: MEDICAL CENTER | Age: 73
End: 2018-05-18
Attending: FAMILY MEDICINE
Payer: MEDICARE

## 2018-05-18 ENCOUNTER — HOSPITAL ENCOUNTER (OUTPATIENT)
Dept: LAB | Facility: MEDICAL CENTER | Age: 73
End: 2018-05-18
Attending: INTERNAL MEDICINE
Payer: MEDICARE

## 2018-05-18 DIAGNOSIS — E55.9 VITAMIN D INSUFFICIENCY: ICD-10-CM

## 2018-05-18 DIAGNOSIS — R73.01 IMPAIRED FASTING BLOOD SUGAR: ICD-10-CM

## 2018-05-18 DIAGNOSIS — I10 ESSENTIAL HYPERTENSION: ICD-10-CM

## 2018-05-18 DIAGNOSIS — Z11.59 NEED FOR HEPATITIS C SCREENING TEST: ICD-10-CM

## 2018-05-18 DIAGNOSIS — E78.00 PURE HYPERCHOLESTEROLEMIA: ICD-10-CM

## 2018-05-18 LAB
25(OH)D3 SERPL-MCNC: 35 NG/ML (ref 30–100)
ALBUMIN SERPL BCP-MCNC: 4.1 G/DL (ref 3.2–4.9)
ALBUMIN SERPL BCP-MCNC: 4.2 G/DL (ref 3.2–4.9)
ALBUMIN/GLOB SERPL: 1.7 G/DL
ALBUMIN/GLOB SERPL: 1.8 G/DL
ALP SERPL-CCNC: 56 U/L (ref 30–99)
ALP SERPL-CCNC: 59 U/L (ref 30–99)
ALT SERPL-CCNC: 14 U/L (ref 2–50)
ALT SERPL-CCNC: 14 U/L (ref 2–50)
ANION GAP SERPL CALC-SCNC: 7 MMOL/L (ref 0–11.9)
ANION GAP SERPL CALC-SCNC: 7 MMOL/L (ref 0–11.9)
AST SERPL-CCNC: 16 U/L (ref 12–45)
AST SERPL-CCNC: 18 U/L (ref 12–45)
BASOPHILS # BLD AUTO: 0.6 % (ref 0–1.8)
BASOPHILS # BLD: 0.03 K/UL (ref 0–0.12)
BILIRUB SERPL-MCNC: 0.5 MG/DL (ref 0.1–1.5)
BILIRUB SERPL-MCNC: 0.6 MG/DL (ref 0.1–1.5)
BUN SERPL-MCNC: 13 MG/DL (ref 8–22)
BUN SERPL-MCNC: 13 MG/DL (ref 8–22)
CALCIUM SERPL-MCNC: 9.4 MG/DL (ref 8.5–10.5)
CALCIUM SERPL-MCNC: 9.4 MG/DL (ref 8.5–10.5)
CHLORIDE SERPL-SCNC: 105 MMOL/L (ref 96–112)
CHLORIDE SERPL-SCNC: 105 MMOL/L (ref 96–112)
CHOLEST SERPL-MCNC: 191 MG/DL (ref 100–199)
CO2 SERPL-SCNC: 28 MMOL/L (ref 20–33)
CO2 SERPL-SCNC: 28 MMOL/L (ref 20–33)
COMMENT 1642: NORMAL
CREAT SERPL-MCNC: 0.45 MG/DL (ref 0.5–1.4)
CREAT SERPL-MCNC: 0.45 MG/DL (ref 0.5–1.4)
CREAT UR-MCNC: 73.3 MG/DL
EOSINOPHIL # BLD AUTO: 0.16 K/UL (ref 0–0.51)
EOSINOPHIL NFR BLD: 3.1 % (ref 0–6.9)
ERYTHROCYTE [DISTWIDTH] IN BLOOD BY AUTOMATED COUNT: 42.3 FL (ref 35.9–50)
EST. AVERAGE GLUCOSE BLD GHB EST-MCNC: 126 MG/DL
GLOBULIN SER CALC-MCNC: 2.4 G/DL (ref 1.9–3.5)
GLOBULIN SER CALC-MCNC: 2.4 G/DL (ref 1.9–3.5)
GLUCOSE SERPL-MCNC: 93 MG/DL (ref 65–99)
GLUCOSE SERPL-MCNC: 96 MG/DL (ref 65–99)
HBA1C MFR BLD: 6 % (ref 0–5.6)
HCT VFR BLD AUTO: 45.6 % (ref 37–47)
HCV AB SER QL: NEGATIVE
HDLC SERPL-MCNC: 62 MG/DL
HGB BLD-MCNC: 15.1 G/DL (ref 12–16)
IMM GRANULOCYTES # BLD AUTO: 0.02 K/UL (ref 0–0.11)
IMM GRANULOCYTES NFR BLD AUTO: 0.4 % (ref 0–0.9)
LDLC SERPL CALC-MCNC: 112 MG/DL
LYMPHOCYTES # BLD AUTO: 1.33 K/UL (ref 1–4.8)
LYMPHOCYTES NFR BLD: 26.2 % (ref 22–41)
MCH RBC QN AUTO: 29.3 PG (ref 27–33)
MCHC RBC AUTO-ENTMCNC: 33.1 G/DL (ref 33.6–35)
MCV RBC AUTO: 88.4 FL (ref 81.4–97.8)
MICROALBUMIN UR-MCNC: <0.7 MG/DL
MICROALBUMIN/CREAT UR: NORMAL MG/G (ref 0–30)
MONOCYTES # BLD AUTO: 0.43 K/UL (ref 0–0.85)
MONOCYTES NFR BLD AUTO: 8.5 % (ref 0–13.4)
MORPHOLOGY BLD-IMP: NORMAL
NEUTROPHILS # BLD AUTO: 3.11 K/UL (ref 2–7.15)
NEUTROPHILS NFR BLD: 61.2 % (ref 44–72)
NRBC # BLD AUTO: 0 K/UL
NRBC BLD-RTO: 0 /100 WBC
PLATELET # BLD AUTO: 221 K/UL (ref 164–446)
PLATELET BLD QL SMEAR: NORMAL
PMV BLD AUTO: 10 FL (ref 9–12.9)
POTASSIUM SERPL-SCNC: 4.1 MMOL/L (ref 3.6–5.5)
POTASSIUM SERPL-SCNC: 4.3 MMOL/L (ref 3.6–5.5)
PROT SERPL-MCNC: 6.5 G/DL (ref 6–8.2)
PROT SERPL-MCNC: 6.6 G/DL (ref 6–8.2)
RBC # BLD AUTO: 5.16 M/UL (ref 4.2–5.4)
RBC BLD AUTO: NORMAL
SODIUM SERPL-SCNC: 140 MMOL/L (ref 135–145)
SODIUM SERPL-SCNC: 140 MMOL/L (ref 135–145)
TRIGL SERPL-MCNC: 84 MG/DL (ref 0–149)
WBC # BLD AUTO: 5.1 K/UL (ref 4.8–10.8)

## 2018-05-18 PROCEDURE — 82306 VITAMIN D 25 HYDROXY: CPT

## 2018-05-18 PROCEDURE — 85025 COMPLETE CBC W/AUTO DIFF WBC: CPT

## 2018-05-18 PROCEDURE — 80053 COMPREHEN METABOLIC PANEL: CPT | Mod: 91

## 2018-05-18 PROCEDURE — 82043 UR ALBUMIN QUANTITATIVE: CPT

## 2018-05-18 PROCEDURE — 36415 COLL VENOUS BLD VENIPUNCTURE: CPT

## 2018-05-18 PROCEDURE — 86803 HEPATITIS C AB TEST: CPT

## 2018-05-18 PROCEDURE — 80061 LIPID PANEL: CPT

## 2018-05-18 PROCEDURE — 83036 HEMOGLOBIN GLYCOSYLATED A1C: CPT

## 2018-05-18 PROCEDURE — 80053 COMPREHEN METABOLIC PANEL: CPT

## 2018-05-18 PROCEDURE — 82570 ASSAY OF URINE CREATININE: CPT

## 2018-05-21 PROBLEM — R73.03 PREDIABETES: Status: ACTIVE | Noted: 2018-05-21

## 2018-05-21 PROBLEM — J00 ACUTE NASOPHARYNGITIS: Status: RESOLVED | Noted: 2018-02-14 | Resolved: 2018-05-21

## 2018-06-28 ENCOUNTER — TELEPHONE (OUTPATIENT)
Dept: MEDICAL GROUP | Age: 73
End: 2018-06-28

## 2018-06-28 NOTE — TELEPHONE ENCOUNTER
Future Appointments       Provider Department Center    7/2/2018 11:00 AM Hailey Bradshaw M.D. [facility] [address]        ESTABLISHED PATIENT PRE-VISIT PLANNING     Note: Patient will not be contacted if there is no indication to call.     1.  Reviewed notes from the last few office visits within the medical group: Yes    2.  If any orders were placed at last visit or intended to be done for this visit (i.e. 6 mos follow-up), do we have Results/Consult Notes?        •  Labs - Labs ordered, completed on 5/18/18 and results are in chart.   Note: If patient appointment is for lab review and patient did not complete labs, check with provider if OK to reschedule patient until labs completed.       •  Imaging - Imaging ordered, completed and results are in chart.       •  Referrals - Referral ordered, patient has NOT been seen.    3. Is this appointment scheduled as a Hospital Follow-Up? No    4.  Immunizations were updated in Epic using WebIZ?: Epic matches WebIZ       •  Web Iz Recommendations: Patient is up to date on all vaccines    5.  Patient is due for the following Health Maintenance Topics:   There are no preventive care reminders to display for this patient.    - Patient is up-to-date on all Health Maintenance topics. No records have been requested at this time.    6.  MDX printed for Provider? NO    7.  Patient was NOT informed to arrive 15 min prior to their scheduled appointment and bring in their medication bottles.

## 2018-07-02 ENCOUNTER — OFFICE VISIT (OUTPATIENT)
Dept: URGENT CARE | Facility: CLINIC | Age: 73
End: 2018-07-02
Payer: MEDICARE

## 2018-07-02 VITALS
BODY MASS INDEX: 25.95 KG/M2 | RESPIRATION RATE: 14 BRPM | HEART RATE: 77 BPM | OXYGEN SATURATION: 96 % | WEIGHT: 152 LBS | SYSTOLIC BLOOD PRESSURE: 118 MMHG | DIASTOLIC BLOOD PRESSURE: 80 MMHG | TEMPERATURE: 97.3 F | HEIGHT: 64 IN

## 2018-07-02 DIAGNOSIS — R23.4 SCAB: ICD-10-CM

## 2018-07-02 PROCEDURE — 99213 OFFICE O/P EST LOW 20 MIN: CPT | Performed by: FAMILY MEDICINE

## 2018-07-03 NOTE — PROGRESS NOTES
Subjective:      Katie Monroy is a 73 y.o. female who presents with Otalgia    Pleasant patient presents to urgent care with a lesion on the back of her left earlobe.  She cannot recall any obvious trauma to the area.  She gets very concerned she has a history of breast cancer no h/o  skin cancers.  Denies any recent weight loss or malaise or fatigue.  No fevers or chills.  No discharge from the area.      HPI  ROS Review of Systems performed. All other systems are negative except for what is listed above.       PMH:  has a past medical history of Arthritis; Asthma; Breast cancer (HCC); Bunion; Cancer (HCC); Cancer (HCC) (2017); Hypertension (2017); Irritable bowel syndrome; Plantar fasciitis of right foot; and Prediabetes.  MEDS:   Current Outpatient Prescriptions:   •  fluticasone (FLONASE) 50 MCG/ACT nasal spray, Spray 1 Spray in nose every day., Disp: , Rfl:   •  promethazine-codeine (PHENERGAN-CODEINE) 6.25-10 MG/5ML Syrup, , Disp: , Rfl:   •  ALPRAZOLAM PO, Take  by mouth., Disp: , Rfl:   •  losartan (COZAAR) 25 MG Tab, Take 1 Tab by mouth every day., Disp: 90 Tab, Rfl: 4  •  albuterol 108 (90 Base) MCG/ACT Aero Soln inhalation aerosol, Inhale 2 Puffs by mouth every 6 hours as needed for Shortness of Breath., Disp: 8.5 g, Rfl: 1  •  Omega-3 Fatty Acids (OMEGA 3 PO), Take  by mouth., Disp: , Rfl:   •  ibuprofen (MOTRIN) 200 MG Tab, Take 400 mg by mouth every 8 hours as needed., Disp: , Rfl:   •  Coenzyme Q10 (COQ-10 PO), Take 1 Tab by mouth every day., Disp: , Rfl:   •  therapeutic multivitamin-minerals (THERAGRAN-M) Tab, Take 1 Tab by mouth every day., Disp: , Rfl:   •  cyanocobalamin (VITAMIN B-12) 500 MCG Tab, Take 500 mcg by mouth every day., Disp: , Rfl:   •  vitamin D (CHOLECALCIFEROL) 1000 UNIT Tab, Take 1,000 Units by mouth every day., Disp: , Rfl:   ALLERGIES:   Allergies   Allergen Reactions   • Tetanus-Diphth-Acell Pertussis      States that she had excessive swelling      SURGHX:   Past Surgical  "History:   Procedure Laterality Date   • MASTECTOMY Left 3/17/2017    Procedure: MASTECTOMY - PARTIAL, WIRE LOCALIZED;  Surgeon: Katy Gastelum M.D.;  Location: SURGERY [facility];  Service:    • NODE BIOPSY SENTINEL Left 3/17/2017    Procedure: NODE BIOPSY SENTINEL;  Surgeon: Katy Gastelum M.D.;  Location: SURGERY [facility];  Service:    • OTHER      right ovary removed     SOCHX:  reports that she has never smoked. She has never used smokeless tobacco. She reports that she drinks alcohol. She reports that she does not use drugs.  FH: Family history was reviewed, no pertinent findings to report   Objective:     /80   Pulse 77   Temp 36.3 °C (97.3 °F)   Resp 14   Ht 1.626 m (5' 4\")   Wt 68.9 kg (152 lb)   SpO2 96%   BMI 26.09 kg/m²      Physical Exam   Constitutional: She is oriented to person, place, and time. She appears well-developed and well-nourished. No distress.   HENT:   Mouth/Throat: Oropharynx is clear and moist.   Eyes: Conjunctivae are normal.   Neck: Normal range of motion.   Cardiovascular: Normal rate.    Pulmonary/Chest: Effort normal.   Musculoskeletal: Normal range of motion.   Lymphadenopathy:     She has no cervical adenopathy.   Neurological: She is alert and oriented to person, place, and time.   Skin: Skin is warm. She is not diaphoretic.   Well-healed scab is noted on the left posterior earlobe.  I removed it myself underneath his healthy skin visible            Assessment/Plan:     1. Scab  Removed and reassured    Differential diagnosis, natural history, supportive care discussed. Follow-up with primary care provider within 7-10 days, emergency room precautions discussed.  Patient and/or family appears understanding of information.      "

## 2018-08-10 ENCOUNTER — HOSPITAL ENCOUNTER (OUTPATIENT)
Dept: LAB | Facility: MEDICAL CENTER | Age: 73
End: 2018-08-10
Attending: INTERNAL MEDICINE
Payer: MEDICARE

## 2018-08-10 LAB
ALBUMIN SERPL BCP-MCNC: 4.3 G/DL (ref 3.2–4.9)
ALBUMIN/GLOB SERPL: 1.7 G/DL
ALP SERPL-CCNC: 69 U/L (ref 30–99)
ALT SERPL-CCNC: 14 U/L (ref 2–50)
ANION GAP SERPL CALC-SCNC: 7 MMOL/L (ref 0–11.9)
AST SERPL-CCNC: 19 U/L (ref 12–45)
BASOPHILS # BLD AUTO: 0.5 % (ref 0–1.8)
BASOPHILS # BLD: 0.03 K/UL (ref 0–0.12)
BILIRUB SERPL-MCNC: 0.5 MG/DL (ref 0.1–1.5)
BUN SERPL-MCNC: 14 MG/DL (ref 8–22)
CALCIUM SERPL-MCNC: 9.3 MG/DL (ref 8.5–10.5)
CHLORIDE SERPL-SCNC: 106 MMOL/L (ref 96–112)
CO2 SERPL-SCNC: 27 MMOL/L (ref 20–33)
CREAT SERPL-MCNC: 0.64 MG/DL (ref 0.5–1.4)
EOSINOPHIL # BLD AUTO: 0.18 K/UL (ref 0–0.51)
EOSINOPHIL NFR BLD: 2.9 % (ref 0–6.9)
ERYTHROCYTE [DISTWIDTH] IN BLOOD BY AUTOMATED COUNT: 44.2 FL (ref 35.9–50)
GLOBULIN SER CALC-MCNC: 2.6 G/DL (ref 1.9–3.5)
GLUCOSE SERPL-MCNC: 91 MG/DL (ref 65–99)
HCT VFR BLD AUTO: 46 % (ref 37–47)
HGB BLD-MCNC: 14.9 G/DL (ref 12–16)
IMM GRANULOCYTES # BLD AUTO: 0.01 K/UL (ref 0–0.11)
IMM GRANULOCYTES NFR BLD AUTO: 0.2 % (ref 0–0.9)
LYMPHOCYTES # BLD AUTO: 1.41 K/UL (ref 1–4.8)
LYMPHOCYTES NFR BLD: 22.9 % (ref 22–41)
MCH RBC QN AUTO: 29.6 PG (ref 27–33)
MCHC RBC AUTO-ENTMCNC: 32.4 G/DL (ref 33.6–35)
MCV RBC AUTO: 91.3 FL (ref 81.4–97.8)
MONOCYTES # BLD AUTO: 0.4 K/UL (ref 0–0.85)
MONOCYTES NFR BLD AUTO: 6.5 % (ref 0–13.4)
NEUTROPHILS # BLD AUTO: 4.13 K/UL (ref 2–7.15)
NEUTROPHILS NFR BLD: 67 % (ref 44–72)
NRBC # BLD AUTO: 0 K/UL
NRBC BLD-RTO: 0 /100 WBC
PLATELET # BLD AUTO: 224 K/UL (ref 164–446)
PMV BLD AUTO: 9.8 FL (ref 9–12.9)
POTASSIUM SERPL-SCNC: 4 MMOL/L (ref 3.6–5.5)
PROT SERPL-MCNC: 6.9 G/DL (ref 6–8.2)
RBC # BLD AUTO: 5.04 M/UL (ref 4.2–5.4)
SODIUM SERPL-SCNC: 140 MMOL/L (ref 135–145)
WBC # BLD AUTO: 6.2 K/UL (ref 4.8–10.8)

## 2018-08-10 PROCEDURE — 85025 COMPLETE CBC W/AUTO DIFF WBC: CPT

## 2018-08-10 PROCEDURE — 36415 COLL VENOUS BLD VENIPUNCTURE: CPT

## 2018-08-10 PROCEDURE — 80053 COMPREHEN METABOLIC PANEL: CPT

## 2018-10-17 ENCOUNTER — HOSPITAL ENCOUNTER (OUTPATIENT)
Dept: RADIOLOGY | Facility: MEDICAL CENTER | Age: 73
End: 2018-10-17
Attending: SURGERY
Payer: MEDICARE

## 2018-10-17 DIAGNOSIS — R92.8 ABNORMAL MAMMOGRAM: ICD-10-CM

## 2018-10-17 PROCEDURE — G0279 TOMOSYNTHESIS, MAMMO: HCPCS

## 2018-11-02 ENCOUNTER — HOSPITAL ENCOUNTER (OUTPATIENT)
Dept: LAB | Facility: MEDICAL CENTER | Age: 73
End: 2018-11-02
Attending: INTERNAL MEDICINE
Payer: MEDICARE

## 2018-11-02 LAB
ALBUMIN SERPL BCP-MCNC: 4.3 G/DL (ref 3.2–4.9)
ALBUMIN/GLOB SERPL: 1.5 G/DL
ALP SERPL-CCNC: 75 U/L (ref 30–99)
ALT SERPL-CCNC: 20 U/L (ref 2–50)
ANION GAP SERPL CALC-SCNC: 8 MMOL/L (ref 0–11.9)
AST SERPL-CCNC: 23 U/L (ref 12–45)
BASOPHILS # BLD AUTO: 0.4 % (ref 0–1.8)
BASOPHILS # BLD: 0.03 K/UL (ref 0–0.12)
BILIRUB SERPL-MCNC: 0.5 MG/DL (ref 0.1–1.5)
BUN SERPL-MCNC: 18 MG/DL (ref 8–22)
CALCIUM SERPL-MCNC: 9.7 MG/DL (ref 8.5–10.5)
CHLORIDE SERPL-SCNC: 103 MMOL/L (ref 96–112)
CO2 SERPL-SCNC: 29 MMOL/L (ref 20–33)
CREAT SERPL-MCNC: 0.59 MG/DL (ref 0.5–1.4)
EOSINOPHIL # BLD AUTO: 0.16 K/UL (ref 0–0.51)
EOSINOPHIL NFR BLD: 2 % (ref 0–6.9)
ERYTHROCYTE [DISTWIDTH] IN BLOOD BY AUTOMATED COUNT: 42.5 FL (ref 35.9–50)
GLOBULIN SER CALC-MCNC: 2.9 G/DL (ref 1.9–3.5)
GLUCOSE SERPL-MCNC: 139 MG/DL (ref 65–99)
HCT VFR BLD AUTO: 45.2 % (ref 37–47)
HGB BLD-MCNC: 15.2 G/DL (ref 12–16)
IMM GRANULOCYTES # BLD AUTO: 0.04 K/UL (ref 0–0.11)
IMM GRANULOCYTES NFR BLD AUTO: 0.5 % (ref 0–0.9)
LYMPHOCYTES # BLD AUTO: 1.77 K/UL (ref 1–4.8)
LYMPHOCYTES NFR BLD: 21.9 % (ref 22–41)
MCH RBC QN AUTO: 30 PG (ref 27–33)
MCHC RBC AUTO-ENTMCNC: 33.6 G/DL (ref 33.6–35)
MCV RBC AUTO: 89.3 FL (ref 81.4–97.8)
MONOCYTES # BLD AUTO: 0.5 K/UL (ref 0–0.85)
MONOCYTES NFR BLD AUTO: 6.2 % (ref 0–13.4)
NEUTROPHILS # BLD AUTO: 5.59 K/UL (ref 2–7.15)
NEUTROPHILS NFR BLD: 69 % (ref 44–72)
NRBC # BLD AUTO: 0 K/UL
NRBC BLD-RTO: 0 /100 WBC
PLATELET # BLD AUTO: 240 K/UL (ref 164–446)
PMV BLD AUTO: 9.8 FL (ref 9–12.9)
POTASSIUM SERPL-SCNC: 4.2 MMOL/L (ref 3.6–5.5)
PROT SERPL-MCNC: 7.2 G/DL (ref 6–8.2)
RBC # BLD AUTO: 5.06 M/UL (ref 4.2–5.4)
SODIUM SERPL-SCNC: 140 MMOL/L (ref 135–145)
WBC # BLD AUTO: 8.1 K/UL (ref 4.8–10.8)

## 2018-11-02 PROCEDURE — 36415 COLL VENOUS BLD VENIPUNCTURE: CPT

## 2018-11-02 PROCEDURE — 80053 COMPREHEN METABOLIC PANEL: CPT

## 2018-11-02 PROCEDURE — 85025 COMPLETE CBC W/AUTO DIFF WBC: CPT

## 2019-01-25 ENCOUNTER — TELEPHONE (OUTPATIENT)
Dept: MEDICAL GROUP | Age: 74
End: 2019-01-25

## 2019-01-25 DIAGNOSIS — E78.2 MIXED HYPERLIPIDEMIA: ICD-10-CM

## 2019-01-25 DIAGNOSIS — R73.03 PREDIABETES: ICD-10-CM

## 2019-01-25 DIAGNOSIS — I10 ESSENTIAL HYPERTENSION: ICD-10-CM

## 2019-01-26 NOTE — TELEPHONE ENCOUNTER
1. Caller Name: Katie Monroy                                           Call Back Number: [phone] (home)       Patient called and would like to get lab order for lipid panel and to check her sugar.    Patient would like to be informed once the lab order is approved.    Please advise

## 2019-01-28 NOTE — TELEPHONE ENCOUNTER
Patient is due for an annual wellness visit.  Please schedule appointment for annual wellness visit in February 2019.  Please advise patient to have blood test done prior to office visit.  The blood test is fasting.  Thank you

## 2019-01-29 ENCOUNTER — PATIENT MESSAGE (OUTPATIENT)
Dept: MEDICAL GROUP | Age: 74
End: 2019-01-29

## 2019-01-31 NOTE — TELEPHONE ENCOUNTER
Phone Number Called: [phone] (home)       Message: Left VM for Pt stating labs have been ordered and Pt needs to schedule an annual wellness visit    Left Message for patient to call back: yes

## 2019-02-01 ENCOUNTER — HOSPITAL ENCOUNTER (OUTPATIENT)
Dept: LAB | Facility: MEDICAL CENTER | Age: 74
End: 2019-02-01
Attending: FAMILY MEDICINE
Payer: MEDICARE

## 2019-02-01 DIAGNOSIS — I10 ESSENTIAL HYPERTENSION: ICD-10-CM

## 2019-02-01 DIAGNOSIS — E78.2 MIXED HYPERLIPIDEMIA: ICD-10-CM

## 2019-02-01 DIAGNOSIS — R73.03 PREDIABETES: ICD-10-CM

## 2019-02-01 LAB
ALBUMIN SERPL BCP-MCNC: 3.9 G/DL (ref 3.2–4.9)
ALBUMIN/GLOB SERPL: 1.3 G/DL
ALP SERPL-CCNC: 63 U/L (ref 30–99)
ALT SERPL-CCNC: 12 U/L (ref 2–50)
ANION GAP SERPL CALC-SCNC: 6 MMOL/L (ref 0–11.9)
AST SERPL-CCNC: 18 U/L (ref 12–45)
BASOPHILS # BLD AUTO: 0.4 % (ref 0–1.8)
BASOPHILS # BLD: 0.04 K/UL (ref 0–0.12)
BILIRUB SERPL-MCNC: 0.5 MG/DL (ref 0.1–1.5)
BUN SERPL-MCNC: 17 MG/DL (ref 8–22)
CALCIUM SERPL-MCNC: 8.9 MG/DL (ref 8.5–10.5)
CHLORIDE SERPL-SCNC: 104 MMOL/L (ref 96–112)
CHOLEST SERPL-MCNC: 182 MG/DL (ref 100–199)
CO2 SERPL-SCNC: 27 MMOL/L (ref 20–33)
CREAT SERPL-MCNC: 0.61 MG/DL (ref 0.5–1.4)
CREAT UR-MCNC: 77.8 MG/DL
EOSINOPHIL # BLD AUTO: 0.29 K/UL (ref 0–0.51)
EOSINOPHIL NFR BLD: 3 % (ref 0–6.9)
ERYTHROCYTE [DISTWIDTH] IN BLOOD BY AUTOMATED COUNT: 45.1 FL (ref 35.9–50)
EST. AVERAGE GLUCOSE BLD GHB EST-MCNC: 117 MG/DL
FASTING STATUS PATIENT QL REPORTED: NORMAL
GLOBULIN SER CALC-MCNC: 2.9 G/DL (ref 1.9–3.5)
GLUCOSE SERPL-MCNC: 88 MG/DL (ref 65–99)
HBA1C MFR BLD: 5.7 % (ref 0–5.6)
HCT VFR BLD AUTO: 44.6 % (ref 37–47)
HDLC SERPL-MCNC: 49 MG/DL
HGB BLD-MCNC: 14.6 G/DL (ref 12–16)
IMM GRANULOCYTES # BLD AUTO: 0.05 K/UL (ref 0–0.11)
IMM GRANULOCYTES NFR BLD AUTO: 0.5 % (ref 0–0.9)
LDLC SERPL CALC-MCNC: 112 MG/DL
LYMPHOCYTES # BLD AUTO: 1.38 K/UL (ref 1–4.8)
LYMPHOCYTES NFR BLD: 14.2 % (ref 22–41)
MCH RBC QN AUTO: 30 PG (ref 27–33)
MCHC RBC AUTO-ENTMCNC: 32.7 G/DL (ref 33.6–35)
MCV RBC AUTO: 91.8 FL (ref 81.4–97.8)
MICROALBUMIN UR-MCNC: <0.7 MG/DL
MICROALBUMIN/CREAT UR: NORMAL MG/G (ref 0–30)
MONOCYTES # BLD AUTO: 0.56 K/UL (ref 0–0.85)
MONOCYTES NFR BLD AUTO: 5.8 % (ref 0–13.4)
NEUTROPHILS # BLD AUTO: 7.4 K/UL (ref 2–7.15)
NEUTROPHILS NFR BLD: 76.1 % (ref 44–72)
NRBC # BLD AUTO: 0 K/UL
NRBC BLD-RTO: 0 /100 WBC
PLATELET # BLD AUTO: 233 K/UL (ref 164–446)
PMV BLD AUTO: 9.8 FL (ref 9–12.9)
POTASSIUM SERPL-SCNC: 3.8 MMOL/L (ref 3.6–5.5)
PROT SERPL-MCNC: 6.8 G/DL (ref 6–8.2)
RBC # BLD AUTO: 4.86 M/UL (ref 4.2–5.4)
SODIUM SERPL-SCNC: 137 MMOL/L (ref 135–145)
TRIGL SERPL-MCNC: 104 MG/DL (ref 0–149)
WBC # BLD AUTO: 9.7 K/UL (ref 4.8–10.8)

## 2019-02-01 PROCEDURE — 80061 LIPID PANEL: CPT

## 2019-02-01 PROCEDURE — 83036 HEMOGLOBIN GLYCOSYLATED A1C: CPT

## 2019-02-01 PROCEDURE — 82043 UR ALBUMIN QUANTITATIVE: CPT

## 2019-02-01 PROCEDURE — 36415 COLL VENOUS BLD VENIPUNCTURE: CPT

## 2019-02-01 PROCEDURE — 80053 COMPREHEN METABOLIC PANEL: CPT

## 2019-02-01 PROCEDURE — 82570 ASSAY OF URINE CREATININE: CPT

## 2019-02-01 PROCEDURE — 85025 COMPLETE CBC W/AUTO DIFF WBC: CPT

## 2019-02-11 ENCOUNTER — NON-PROVIDER VISIT (OUTPATIENT)
Dept: URGENT CARE | Facility: CLINIC | Age: 74
End: 2019-02-11

## 2019-02-11 DIAGNOSIS — Z02.1 PRE-EMPLOYMENT DRUG SCREENING: ICD-10-CM

## 2019-02-11 LAB
AMP AMPHETAMINE: NORMAL
BAR BARBITURATES: NORMAL
BZO BENZODIAZEPINES: NORMAL
COC COCAINE: NORMAL
INT CON NEG: NORMAL
INT CON POS: NORMAL
MDMA ECSTASY: NORMAL
MET METHAMPHETAMINES: NORMAL
MTD METHADONE: NORMAL
OPI OPIATES: NORMAL
OXY OXYCODONE: NORMAL
PCP PHENCYCLIDINE: NORMAL
POC URINE DRUG SCREEN OCDRS: NEGATIVE
THC: NORMAL

## 2019-02-11 PROCEDURE — 80305 DRUG TEST PRSMV DIR OPT OBS: CPT | Performed by: NURSE PRACTITIONER

## 2019-02-14 DIAGNOSIS — I10 ESSENTIAL HYPERTENSION: ICD-10-CM

## 2019-02-14 RX ORDER — OLMESARTAN MEDOXOMIL 20 MG/1
20 TABLET ORAL DAILY
Qty: 90 TAB | Refills: 1 | Status: SHIPPED | OUTPATIENT
Start: 2019-02-14 | End: 2019-02-22

## 2019-02-19 ENCOUNTER — TELEPHONE (OUTPATIENT)
Dept: MEDICAL GROUP | Age: 74
End: 2019-02-19

## 2019-02-19 NOTE — TELEPHONE ENCOUNTER
ESTABLISHED PATIENT PRE-VISIT PLANNING     Patient was NOT contacted to complete PVP.     Note: Patient will not be contacted if there is no indication to call.     1.  Reviewed notes from the last few office visits within the medical group: Yes    2.  If any orders were placed at last visit or intended to be done for this visit (i.e. 6 mos follow-up), do we have Results/Consult Notes?        •  Labs - Labs ordered, completed on 2/1/19 and results are in chart.   Note: If patient appointment is for lab review and patient did not complete labs, check with provider if OK to reschedule patient until labs completed.       •  Imaging - Imaging ordered, completed and results are in chart.       •  Referrals - No referrals were ordered at last office visit.    3. Is this appointment scheduled as a Hospital Follow-Up? No    4.  Immunizations were updated in Epic using WebIZ?: Epic matches WebIZ       •  Web Iz Recommendations: SHINGRIX (Shingles)    5.  Patient is due for the following Health Maintenance Topics:   Health Maintenance Due   Topic Date Due   • IMM ZOSTER VACCINES (2 of 3) 12/30/2011   • Annual Wellness Visit  02/02/2019   • COLON CANCER SCREENING ANNUAL FIT  02/24/2019           6. Orders for overdue Health Maintenance topics pended in Pre-Charting? N\A    7.  AHA (MDX) form printed for Provider? YES    8.  Patient was NOT informed to arrive 15 min prior to their scheduled appointment and bring in their medication bottles.

## 2019-02-22 ENCOUNTER — OFFICE VISIT (OUTPATIENT)
Dept: MEDICAL GROUP | Age: 74
End: 2019-02-22
Payer: MEDICARE

## 2019-02-22 VITALS
HEIGHT: 64 IN | HEART RATE: 73 BPM | BODY MASS INDEX: 25.99 KG/M2 | WEIGHT: 152.2 LBS | SYSTOLIC BLOOD PRESSURE: 118 MMHG | OXYGEN SATURATION: 96 % | DIASTOLIC BLOOD PRESSURE: 66 MMHG | TEMPERATURE: 98.6 F

## 2019-02-22 DIAGNOSIS — I10 ESSENTIAL HYPERTENSION: Primary | ICD-10-CM

## 2019-02-22 DIAGNOSIS — H61.23 BILATERAL IMPACTED CERUMEN: ICD-10-CM

## 2019-02-22 DIAGNOSIS — F41.9 ANXIETY: ICD-10-CM

## 2019-02-22 DIAGNOSIS — Z85.3 PERSONAL HISTORY OF MALIGNANT NEOPLASM OF BREAST: ICD-10-CM

## 2019-02-22 PROCEDURE — 8041 PR SCP AHA: Performed by: FAMILY MEDICINE

## 2019-02-22 PROCEDURE — 69210 REMOVE IMPACTED EAR WAX UNI: CPT | Performed by: FAMILY MEDICINE

## 2019-02-22 PROCEDURE — 99214 OFFICE O/P EST MOD 30 MIN: CPT | Mod: 25 | Performed by: FAMILY MEDICINE

## 2019-02-22 RX ORDER — CITALOPRAM HYDROBROMIDE 10 MG/1
20 TABLET ORAL DAILY
Qty: 90 TAB | Refills: 1 | Status: SHIPPED | OUTPATIENT
Start: 2019-02-22 | End: 2019-02-27 | Stop reason: SDUPTHER

## 2019-02-22 RX ORDER — ALPRAZOLAM 0.25 MG/1
0.25 TABLET ORAL NIGHTLY PRN
Qty: 30 TAB | Refills: 0 | Status: SHIPPED | OUTPATIENT
Start: 2019-02-22 | End: 2019-03-24

## 2019-02-22 RX ORDER — AMLODIPINE BESYLATE 5 MG/1
5 TABLET ORAL DAILY
Qty: 30 TAB | Refills: 0 | Status: SHIPPED | OUTPATIENT
Start: 2019-02-22 | End: 2019-03-27 | Stop reason: SDUPTHER

## 2019-02-22 ASSESSMENT — PATIENT HEALTH QUESTIONNAIRE - PHQ9: CLINICAL INTERPRETATION OF PHQ2 SCORE: 0

## 2019-02-22 NOTE — PROGRESS NOTES
Annual Health Assessment Questions:    1.  Are you currently engaging in any exercise or physical activity? Yes    2.  How would you describe your mood or emotional well-being today? good    3.  Have you had any falls in the last year? No    4.  Have you noticed any problems with your balance or had difficulty walking? No    5.  In the last six months have you experienced any leakage of urine? No    6. DPA/Advanced Directive: Patient does not have an Advanced Directive.  A packet and workshop information was given on Advanced Directives.

## 2019-02-22 NOTE — PROGRESS NOTES
Subjective:   CC: HTN f/u     HPI:     Katie Monroy is a 74 y.o. female who is here as an established patient and presents with the following concerns:    Patient presents today for review of her hypertension medication. Patient previously took Losartan and reported that it was effective for controlling her hypertension. However, the medication caused her to experience dry mouth and sore throat. During her last visit, her Losartan was replaced with Benicar. Patient tried taking the medication but noticed that her blood pressure dropped to 95/60. She decreased her dose to 10 mg but was still experiencing low blood pressure while on the medication. She tried taking Benicar 5 mg two days ago, but began experiencing shortness of breath and bilateral ear blockage. She has asthma but states that her asthma is under good control. She has not used her inhalers for several months. She would like to try another hypertension medication at this time. Pt has hx of PEDRO, previously taking Xanax as needed for symptoms. However, she is concerned of side effects and has not been taking them. She c/o worsening anxiety over the past few months w/o obvious triggers. She is interested in treatment for PEDRO but declined the need of seeing therapist.     Current medicines (including changes today)  Current Outpatient Prescriptions   Medication Sig Dispense Refill   • amLODIPine (NORVASC) 5 MG Tab Take 1 Tab by mouth every day. 30 Tab 0   • citalopram (CELEXA) 10 MG tablet Take 2 Tabs by mouth every day. 90 Tab 1   • ALPRAZolam (XANAX) 0.25 MG Tab Take 1 Tab by mouth at bedtime as needed for Anxiety for up to 30 days. 30 Tab 0   • carbamide peroxide (CARBAMOXIDE EAR DROPS) 6.5 % Solution Place 6 Drops in left ear 2 times a day. 1 Bottle 0   • ibuprofen (MOTRIN) 200 MG Tab Take 400 mg by mouth every 8 hours as needed.     • Coenzyme Q10 (COQ-10 PO) Take 1 Tab by mouth every day.     • therapeutic multivitamin-minerals (THERAGRAN-M) Tab  "Take 1 Tab by mouth every day.     • cyanocobalamin (VITAMIN B-12) 500 MCG Tab Take 500 mcg by mouth every day.     • vitamin D (CHOLECALCIFEROL) 1000 UNIT Tab Take 1,000 Units by mouth every day.       No current facility-administered medications for this visit.      She  has a past medical history of Arthritis; Asthma; Breast cancer (HCC); Bunion; Cancer (HCC); Cancer (HCC) (2017); Hypertension (2017); Irritable bowel syndrome; Plantar fasciitis of right foot; and Prediabetes.    I personally reviewed patient's problem list, allergies, medications, family hx, social hx with patient and update EPIC.     REVIEW OF SYSTEMS:  CONSTITUTIONAL:  Denies night sweats, fatigue, malaise, lethargy, fever or chills.  RESPIRATORY:  Denies cough, wheeze or hemoptysis.   CARDIOVASCULAR:  Denies chest palpitations or pedal edema.     Objective:     Blood pressure 118/66, pulse 73, temperature 37 °C (98.6 °F), temperature source Temporal, height 1.626 m (5' 4\"), weight 69 kg (152 lb 3.2 oz), SpO2 96 %, not currently breastfeeding. Body mass index is 26.13 kg/m².    Physical Exam:  Constitutional: awake, alert, in no distress.  Skin: Warm, dry, good turgor, no rashes, bruises, ulcers in visible areas.  Eye: conjunctiva clear, lids neg for edema or lesions.  ENMT: TM and auditory canals wnl. Severe cerumen impaction bilateral ears. Oral and nasal mucosa wnl. Lips without lesions, good dentition, oropharynx clear.  Neck: Trachea midline, no masses, no thyromegaly. No cervical or supraclavicular lymphadenopathy  Respiratory: Unlabored respiratory effort, lungs clear to auscultation, no wheezes, no rales.  Cardiovascular: Normal S1, S2, no murmur, no pedal edema.  Psych: Oriented x3, affect and mood wnl, intact judgement and insight.       Assessment and Plan:   The following treatment plan was discussed    1. Essential hypertension  Chronic, previously well controlled with Losartan, but c/o throat dryness with Losartan. She then tried " Benicar, but c/o blocked ears and low blood pressure despite trying to cut back to 5 mg daily. Her BP is 188/66 today. Plans:   - trial of amLODIPine (NORVASC) 5 MG Tab; Take 1 Tab by mouth every day.  Dispense: 30 Tab; Refill: 0  - Pt was counseled on dietary modification, weight loss, smoking cessation, and avoidance of excessive alcohol consumption.    - Recommended moderate intensity exercise at least 30 minutes per day x 5 days per week.   - MA appt for BP recheck in 2-4 weeks.     2. Anxiety  Chronic, previously taking Xanax as needed, but c/o worsening anxiety leading to unstable blood pressure. She is interested in treatment. Plans:   - citalopram (CELEXA) 10 MG tablet; Take 2 Tabs by mouth every day.  Dispense: 90 Tab; Refill: 1  - ALPRAZolam (XANAX) 0.25 MG Tab; Take 1 Tab by mouth at bedtime as needed for Anxiety for up to 30 days.  Dispense: 30 Tab; Refill: 0  - recommended counseling but pt declined.    - regular exercise, well-balanced diet, multi-vitamin daily, weight loss, adequate sleep (8 hours per day), meditation, stress management.   - Avoid excessive consumption of stimulants, alcohol, illicit drugs, tobacco  - f/u in 3 months.       3. Bilateral impacted cerumen  Exam noted for bilateral cerumen impaction. I was able to removed ear wax in the right ear, but unable to completely remove cerumen in the left ear. Will prescribe Debrox eardrops and schedule appointment for pt to return in a few weeks for ear lavage by MA. Plans:   - carbamide peroxide (CARBAMOXIDE EAR DROPS) 6.5 % Solution; Place 6 Drops in left ear 2 times a day.  Dispense: 1 Bottle; Refill: 0    4. Personal history of malignant neoplasm of breast  Hx of invasive ductal carcinoma [id number], ER-, [place]-, Her/Nu-, s/p left partial mastectomy and nodes biopsy in 3/2017. Pt declined chemo and radiation. She is doing well currently, denies active breast symptoms. She continues to f/u with Dr. Gastelum.   - f/u with oncology as directed  -  annual mammogram.     Procedure note: ear wax removal.   Bilateral ears were partially irrigated by MA. I personally removed the rest of ear wax in the right ear using a lighted curette. However, I was not able to remove all the ear wax from the left ear due to discomfort. Will prescribe Debrox eardrops and schedule appt for pt to return to clinic in a few weeks for ear lavage by MA. Pt tolerated the procedure well, no immediate complication noted.      Hailey Bradshaw M.D.    Followup: Return in about 4 weeks (around 3/22/2019) for ear lavage, left.    Please note that this dictation was created using voice recognition software and/or scribes. I have made every reasonable attempt to correct obvious errors, but I expect that there are errors of grammar and possibly content that I did not discover before finalizing the note.     Roverto OBREGON (Scribe), am scribing for, and in the presence of, Hailey Bradshaw M.D.    Electronically signed by: Roverto Benjamin ([place]), 2/22/2019    Hailey OBREGON M.D. personally performed the services described in this documentation, as scribed by Roverto Benjamin in my presence, and it is both accurate and complete.

## 2019-02-26 ENCOUNTER — TELEPHONE (OUTPATIENT)
Dept: MEDICAL GROUP | Age: 74
End: 2019-02-26

## 2019-02-26 DIAGNOSIS — F41.9 ANXIETY: ICD-10-CM

## 2019-02-26 NOTE — TELEPHONE ENCOUNTER
1. Caller Name: [facility]                                          Call Back Number: [phone]    Ref number: [id number]      citalopram (CELEXA) 10 MG tablet    [facility] is wondering if we can switch the direction Take 2 Tabs by mouth every day to Take 1 tab by moyth every day.    Rep state why does the patient need to take 2 tabs?     Please advise

## 2019-02-27 RX ORDER — CITALOPRAM HYDROBROMIDE 10 MG/1
10 TABLET ORAL DAILY
Qty: 90 TAB | Refills: 1 | Status: SHIPPED | OUTPATIENT
Start: 2019-02-27 | End: 2019-05-15

## 2019-02-28 NOTE — TELEPHONE ENCOUNTER
1. Caller Name: Katie Monroy                                           Call Back Number: [phone] (home)         Patient approves a detailed voicemail message: N\A    Pt notified

## 2019-03-13 ENCOUNTER — NON-PROVIDER VISIT (OUTPATIENT)
Dept: MEDICAL GROUP | Age: 74
End: 2019-03-13
Payer: MEDICARE

## 2019-03-13 NOTE — PROGRESS NOTES
Katie Monroy is a 74 y.o. female here for a non-provider visit for Ear lavage    If abnormal was an in office provider notified today (if so, indicate provider)? No  Routed to PCP? Yes      Patient states she's been putting in drops that she was given at last office visit but still experiencing pain in left ear.  Came in for ear lavage, per patient it did help alleviate some of the pain but unable to tolerate procedure. Will be back for another non provider ear lavage if pain persists also advised to schedule another follow up with Dr. Bradshaw.

## 2019-03-21 ENCOUNTER — TELEPHONE (OUTPATIENT)
Dept: MEDICAL GROUP | Age: 74
End: 2019-03-21

## 2019-03-21 DIAGNOSIS — Z12.11 COLON CANCER SCREENING: ICD-10-CM

## 2019-03-27 DIAGNOSIS — I10 ESSENTIAL HYPERTENSION: ICD-10-CM

## 2019-03-28 RX ORDER — AMLODIPINE BESYLATE 5 MG/1
5 TABLET ORAL DAILY
Qty: 90 TAB | Refills: 0 | Status: SHIPPED | OUTPATIENT
Start: 2019-03-28 | End: 2019-06-18 | Stop reason: SDUPTHER

## 2019-04-05 ENCOUNTER — OFFICE VISIT (OUTPATIENT)
Dept: MEDICAL GROUP | Age: 74
End: 2019-04-05
Payer: MEDICARE

## 2019-04-05 VITALS
WEIGHT: 155 LBS | SYSTOLIC BLOOD PRESSURE: 118 MMHG | DIASTOLIC BLOOD PRESSURE: 70 MMHG | HEIGHT: 64 IN | OXYGEN SATURATION: 92 % | HEART RATE: 79 BPM | TEMPERATURE: 98.6 F | BODY MASS INDEX: 26.46 KG/M2

## 2019-04-05 DIAGNOSIS — Z85.3 PERSONAL HISTORY OF MALIGNANT NEOPLASM OF BREAST: ICD-10-CM

## 2019-04-05 DIAGNOSIS — H61.23 BILATERAL IMPACTED CERUMEN: Primary | ICD-10-CM

## 2019-04-05 DIAGNOSIS — I10 ESSENTIAL HYPERTENSION: ICD-10-CM

## 2019-04-05 PROCEDURE — 69210 REMOVE IMPACTED EAR WAX UNI: CPT | Performed by: FAMILY MEDICINE

## 2019-04-05 PROCEDURE — 99214 OFFICE O/P EST MOD 30 MIN: CPT | Mod: 25 | Performed by: FAMILY MEDICINE

## 2019-04-06 NOTE — PROGRESS NOTES
Subjective:   CC: Cerumen impaction    HPI:     Katie Monroy is a 74 y.o. female who is an established patient of the clinic, presents with the following concerns:     She presents today complaining of persistent bilateral ear fullness. During her last office visit on 02/22/19, she was found to have bilateral cerumen impactions. An ear lavage was completed at that time and ear wax was removed from the right ear but was not completely removed in the left ear. No change in symptoms with Debrox ear drops. No acute complaint of ear pain or cold symptoms.  Denies auditory dysfunction, abnormal ear bleeding/discharge.    She has a history of breast cancer which was treated with a mastectomy in 2017 and radiation therapy. She did not have chemotherapy completed. Her surgeon was Dr. Gastelum. She is followed by Dr. Rhodes, Oncology. She reportedly completes a mammogram annually.    She has a chronic history of hypertension. Blood pressure was previously well controlled with Losartan but was discontinued after experiencing throat dryness. She was then on Benicar which was discontinued after persistent hypotension despite decreasing the dose. She was started on Amlodipine 5 mg on 02/22/19. She is doing well on this medication and reports no side effects. Blood pressure is stable today at 118/70. Health maintenance reviewed.     She received the Cologuard stool test but has not completed it.      Current medicines (including changes today)  Current Outpatient Prescriptions   Medication Sig Dispense Refill   • amLODIPine (NORVASC) 5 MG Tab Take 1 Tab by mouth every day. 90 Tab 0   • citalopram (CELEXA) 10 MG tablet Take 1 Tab by mouth every day. 90 Tab 1   • carbamide peroxide (CARBAMOXIDE EAR DROPS) 6.5 % Solution Place 6 Drops in left ear 2 times a day. 1 Bottle 0   • ibuprofen (MOTRIN) 200 MG Tab Take 400 mg by mouth every 8 hours as needed.     • Coenzyme Q10 (COQ-10 PO) Take 1 Tab by mouth every day.     • therapeutic  "multivitamin-minerals (THERAGRAN-M) Tab Take 1 Tab by mouth every day.     • cyanocobalamin (VITAMIN B-12) 500 MCG Tab Take 500 mcg by mouth every day.     • vitamin D (CHOLECALCIFEROL) 1000 UNIT Tab Take 1,000 Units by mouth every day.       No current facility-administered medications for this visit.      She  has a past medical history of Arthritis; Asthma; Breast cancer (HCC); Bunion; Cancer (HCC); Cancer (HCC) (2017); Hypertension (2017); Irritable bowel syndrome; Plantar fasciitis of right foot; and Prediabetes.    I personally reviewed patient's problem list, allergies, medications, family hx, social hx with patient and update EPIC.     REVIEW OF SYSTEMS:  CONSTITUTIONAL:  Denies night sweats, fatigue, malaise, lethargy, fever or chills.  HENT: Denies ear pain or cold symptoms.  RESPIRATORY:  Denies cough, wheeze, hemoptysis or shortness of breath.  CARDIOVASCULAR:  Denies chest pains, palpitations or pedal edema.  MUSCULOSKELETAL: Denies back pain.    See HPI for further details.         Objective:     /70 (BP Location: Right arm, Patient Position: Sitting, BP Cuff Size: Adult)   Pulse 79   Temp 37 °C (98.6 °F) (Temporal)   Ht 1.626 m (5' 4\")   Wt 70.3 kg (155 lb)   SpO2 92%  Body mass index is 26.61 kg/m².    Physical Exam:  Constitutional: awake, alert, in no distress.  Skin: Warm, dry, good turgor, no rashes, bruises, ulcers in visible areas.  Eye: conjunctiva clear, lids neg for edema or lesions.  ENMT: Severe cerumen impaction on the left.  Oral and nasal mucosa wnl. Lips without lesions, good dentition, oropharynx clear.  Neck: Trachea midline, no masses, no thyromegaly. No cervical or supraclavicular lymphadenopathy  Respiratory: Unlabored respiratory effort, lungs clear to auscultation, no wheezes, no rales.  Cardiovascular: Normal S1, S2, no murmur, no pedal edema.  Psych: Oriented x3, affect and mood wnl, intact judgement and insight.       Assessment and Plan:   The following treatment " plan was discussed:    1. Bilateral impacted cerumen  History of bilateral cerumen impaction, status post ear lavage and wax removal on the right couple months ago.  However, we was not able to remove any wax from the left ear.  Patient has been using Debrox and requests ear lavage of the left ear today.  -Ear lavage, see procedure note below    2. Essential hypertension  Chronic, currently taking amlodipine 5 mg daily, her blood pressure is under good control.  It is questionable if she needs to take amlodipine for blood pressure.  However, patient is extremely anxious about hypertension and request to continue amlodipine.  She denies dizziness, orthostatic hypotension chest pain, shortness of breath, dyspnea on exertion.  -Continue amlodipine 5 mg daily  - Pt was counseled on dietary modification, weight loss, smoking cessation, and avoidance of excessive alcohol consumption.    - Recommended moderate intensity exercise at least 30 minutes per day x 5 days per week.     3. Personal history of malignant neoplasm of breast, left  Diagnosed with invasive ductal carcinoma [id number], ER-, [place]-, Her/Nu-, status post left partial mastectomy and sentinel node biopsy in March 2017 declined chemo, but did have radiation.  Patient denies any active breast symptoms.  She continues to follow-up with Dr. Rhodes regularly as directed.  Her last mammogram was in October 2018, which was benign.  -Continue to follow-up with Dr. Rhodes as directed.    Procedure note: ear wax removal.   Left ear is partially irrigated by MA. I personally removed the rest of ear wax using a lighted curette pt tolerated the procedure well, no immediate complication noted.     Hailey Bradshaw M.D.    Follow up: Return for As needed.    Please note that this dictation was created using voice recognition software and/or scribes. I have made every reasonable attempt to correct obvious errors, but I expect that there are errors of grammar and possibly content that  I did not discover before finalizing the note.     I, Sammie Street (Scribe), am scribing for, and in the presence of, Hailey Bradshaw M.D.    Electronically signed by: Sammie Street ([place]), 4/5/2019    Hailey OBREGON M.D. personally performed the services described in this documentation, as scribed by Sammie Street in my presence, and it is both accurate and complete.

## 2019-05-15 ENCOUNTER — OFFICE VISIT (OUTPATIENT)
Dept: MEDICAL GROUP | Age: 74
End: 2019-05-15
Payer: MEDICARE

## 2019-05-15 VITALS
SYSTOLIC BLOOD PRESSURE: 116 MMHG | TEMPERATURE: 98.9 F | WEIGHT: 152.4 LBS | BODY MASS INDEX: 26.02 KG/M2 | HEIGHT: 64 IN | DIASTOLIC BLOOD PRESSURE: 62 MMHG | HEART RATE: 78 BPM | OXYGEN SATURATION: 94 %

## 2019-05-15 DIAGNOSIS — E78.5 DYSLIPIDEMIA: ICD-10-CM

## 2019-05-15 DIAGNOSIS — M72.2 PLANTAR FASCIITIS: ICD-10-CM

## 2019-05-15 DIAGNOSIS — Z00.00 MEDICARE ANNUAL WELLNESS VISIT, SUBSEQUENT: ICD-10-CM

## 2019-05-15 DIAGNOSIS — I10 ESSENTIAL HYPERTENSION: ICD-10-CM

## 2019-05-15 DIAGNOSIS — M17.0 PRIMARY OSTEOARTHRITIS OF BOTH KNEES: ICD-10-CM

## 2019-05-15 DIAGNOSIS — F41.9 ANXIETY: ICD-10-CM

## 2019-05-15 DIAGNOSIS — R73.03 PREDIABETES: ICD-10-CM

## 2019-05-15 DIAGNOSIS — Z85.3 PERSONAL HISTORY OF MALIGNANT NEOPLASM OF BREAST: ICD-10-CM

## 2019-05-15 PROBLEM — R31.21 ASYMPTOMATIC MICROSCOPIC HEMATURIA: Status: RESOLVED | Noted: 2018-02-05 | Resolved: 2019-05-15

## 2019-05-15 PROCEDURE — G0439 PPPS, SUBSEQ VISIT: HCPCS | Performed by: FAMILY MEDICINE

## 2019-05-15 PROCEDURE — 8041 PR SCP AHA: Performed by: FAMILY MEDICINE

## 2019-05-15 ASSESSMENT — ENCOUNTER SYMPTOMS: GENERAL WELL-BEING: FAIR

## 2019-05-15 ASSESSMENT — PAIN SCALES - GENERAL: PAINLEVEL: NO PAIN

## 2019-05-15 ASSESSMENT — ACTIVITIES OF DAILY LIVING (ADL): BATHING_REQUIRES_ASSISTANCE: 0

## 2019-05-15 ASSESSMENT — PATIENT HEALTH QUESTIONNAIRE - PHQ9: CLINICAL INTERPRETATION OF PHQ2 SCORE: 0

## 2019-05-15 NOTE — PROGRESS NOTES
Chief Complaint   Patient presents with   • Annual Wellness Visit     annual   • Overdue Lab And Imaging Result Follow-up     would like to review lab work results         HPI:  Katie Monroy is a 74 y.o. here for Medicare Annual Wellness Visit     She has a history of dyslipidemia but is not currently taking a statin. Lipid panel was last completed on 02/01/19 and indicated slight LDL elevation. She is attempting to manage her cholesterol via diet modification and exercise. Additionally, she has a history of prediabetes. Glycohemoglobin was 5.7 on 02/01/19. No prior history of diabetes and is not currently on medications. Denies urinary frequency, polydipsia or vision changes.    She has a chronic history of hypertension. Blood pressure was previously well controlled with Losartan but was discontinued after experiencing throat dryness. She was then on Benicar which was discontinued after persistent hypotension despite decreasing the dose. She was started on Amlodipine 5 mg on 02/22/19. She is doing well on this medication and reports no side effects. Blood pressure is stable today at 116/62.     She has a chronic history of anxiety. Previously, she was prescribed Citalopram but did not start the medication. She feels her anxiety is under good control at this time without medications. She denies suicidal ideations or plans.    She has a history of chronic right plantar fasciitis. Pain remains constant but is well tolerated by the patient. She has a history of osteoarthritis of her bilateral knees. Pain is well controlled with Tylenol taken as needed. Occasionally, she will experience swelling to her knees which resolves with rest, elevation and cold therapy. She has not been treated with steroid injections in the past. Denies recent falls, injury or trauma.      Patient Active Problem List    Diagnosis Date Noted   • Prediabetes 05/21/2018   • Anxiety, declined med 02/14/2018   • Dyslipidemia- mild no meds  02/05/2018   • Osteopenia of multiple sites 02/01/2018   • Personal history of malignant neoplasm of breast, left_Bowman 03/08/2017   • Primary osteoarthritis of both knees 07/08/2016   • Essential hypertension 05/25/2016   • History of skin cancer of unknown type 05/25/2016   • Asthma, mild persistent 05/24/2016   • Plantar fasciitis, R 02/03/2006       Current Outpatient Prescriptions   Medication Sig Dispense Refill   • Multiple Vitamins-Minerals (ONE-A-DAY 50 PLUS PO) Take  by mouth.     • amLODIPine (NORVASC) 5 MG Tab Take 1 Tab by mouth every day. 90 Tab 0   • Coenzyme Q10 (COQ-10 PO) Take 1 Tab by mouth every day.     • cyanocobalamin (VITAMIN B-12) 500 MCG Tab Take 500 mcg by mouth every day.     • vitamin D (CHOLECALCIFEROL) 1000 UNIT Tab Take 1,000 Units by mouth every day.       No current facility-administered medications for this visit.             Current supplements as per medication list.       Allergies: Tetanus-diphth-acell pertussis    Current social contact/activities: spends time with friends and family    She  reports that she has never smoked. She has never used smokeless tobacco. She reports that she drinks alcohol. She reports that she does not use drugs.  Counseling given: Not Answered      DPA/Advanced Directive:  Patient has Advanced Directive on file.     ROS:    Gait: Uses no assistive device  Ostomy: No  Other tubes: No  Amputations: No  Chronic oxygen use: No  Last eye exam: 1/2019  Wears hearing aids: No   : Denies any urinary leakage during the last 6 months     CONSTITUTIONAL:  Denies polydipsia, night sweats, fatigue, malaise, lethargy, fever or chills.  EYES: Denies vision changes.  RESPIRATORY:  Denies cough, wheeze, hemoptysis or shortness of breath.  CARDIOVASCULAR:  Denies chest pains, palpitations or pedal edema.  : Denies urinary symptoms.  PSYCHIATRY: Denies suicidal ideations or plan.  MUSCULOSKELETAL: Denies recent falls, injury or trauma.    See HPI for further  details.        Screening:    Depression Screening    Little interest or pleasure in doing things?  0 - not at all  Feeling down, depressed , or hopeless? 0 - not at all    Screening for Cognitive Impairment    Three Minute Recall (village, kitchen, baby) 3/3    Shimon clock face with all 12 numbers and set the hands to show 10 past 10.  Yes      Fall Risk Assessment    Has the patient had two or more falls in the last year or any fall with injury in the last year?  No    Safety Assessment    Throw rugs on floor.  Yes  Handrails on all stairs.  No  Good lighting in all hallways.  Yes  Difficulty hearing.  No  Patient counseled about all safety risks that were identified.    Functional Assessment ADLs    Are there any barriers preventing you from cooking for yourself or meeting nutritional needs?  No.    Are there any barriers preventing you from driving safely or obtaining transportation?  No.    Are there any barriers preventing you from using a telephone or calling for help?  No.    Are there any barriers preventing you from shopping?  No.    Are there any barriers preventing you from taking care of your own finances?  No.    Are there any barriers preventing you from managing your medications?  No.    Are there any barriers preventing you from showering, bathing or dressing yourself? No.    Are you currently engaging in any exercise or physical activity?  Yes.  Fitness club and walks a lot  What is your perception of your health?  Fair.      Health Maintenance Summary                IMM ZOSTER VACCINES Overdue 12/30/2011      Done 11/4/2011 Imm Admin: Zoster Vaccine Live (ZVL) (Zostavax)    Annual Wellness Visit Overdue 2/2/2019      Done 2/1/2018 Visit Dx: Medicare annual wellness visit, subsequent     Patient has more history with this topic...    MAMMOGRAM Next Due 10/17/2019      Done 10/17/2018 MA-MAMMO DIAGNOSTIC BILAT W/TOMOSYNTHESIS W/CAD     Patient has more history with this topic...    COLOGUARD STOOL  "DNA Next Due 4/24/2022      Done 4/24/2019 COLOGUARD COLON CANCER SCREENING     Patient has more history with this topic...    BONE DENSITY Next Due 2/7/2023      Done 2/7/2018 DS-BONE DENSITY STUDY (DEXA)    IMM DTaP/Tdap/Td Vaccine Next Due 8/31/2025      Done 8/31/2015 Imm Admin: Tdap Vaccine          Patient Care Team:  Hailey Bradshaw M.D. as PCP - General (Family Medicine)  Katy Gastelum M.D. (Surgery)  [facility] as Consulting Physician  Osman Rhodes M.D. as Consulting Physician (Oncology)  Belkys Duke M.D. as Consulting Physician (Gynecology)      Social History   Substance Use Topics   • Smoking status: Never Smoker   • Smokeless tobacco: Never Used   • Alcohol use 0.0 oz/week      Comment: occasional     Family History   Problem Relation Age of Onset   • Cancer Mother         leukemia   • Heart Disease Mother         arrhythmia   • Cancer Father         lung   • Lung Disease Father    • No Known Problems Sister    • Diabetes Neg Hx      She  has a past medical history of Arthritis; Asthma; Breast cancer (HCC); Bunion; Cancer (HCC); Cancer (HCC) (2017); Hypertension (2017); Irritable bowel syndrome; Plantar fasciitis of right foot; and Prediabetes.   Past Surgical History:   Procedure Laterality Date   • MASTECTOMY Left 3/17/2017    Procedure: MASTECTOMY - PARTIAL, WIRE LOCALIZED;  Surgeon: Katy Gastelum M.D.;  Location: [facility];  Service:    • NODE BIOPSY SENTINEL Left 3/17/2017    Procedure: NODE BIOPSY SENTINEL;  Surgeon: Katy Gastelum M.D.;  Location: [facility];  Service:    • OTHER      right ovary removed       Exam:   /62 (BP Location: Left arm, Patient Position: Sitting, BP Cuff Size: Adult)   Pulse 78   Temp 37.2 °C (98.9 °F)   Ht 1.626 m (5' 4\")   Wt 69.1 kg (152 lb 6.4 oz)   SpO2 94%  Body mass index is 26.16 kg/m².    Hearing excellent.    Dentition good  Alert, oriented in no acute distress.  Eye contact is good, speech goal " directed, affect calm  Constitutional: awake, alert, in no distress.  Skin: Warm, dry, good turgor, no rashes, bruises, ulcers in visible areas.  Eye: conjunctiva clear, lids neg for edema or lesions.  ENMT: TM and auditory canals wnl. Mild cerumen noted to right auditory canal. Oral and nasal mucosa wnl. Lips without lesions, good dentition, oropharynx clear.  Neck: Trachea midline, no masses, no thyromegaly. No cervical or supraclavicular lymphadenopathy  Respiratory: Unlabored respiratory effort, lungs clear to auscultation, no wheezes, no rales.  Cardiovascular: Normal S1, S2, no murmur, no pedal edema.  Psych: Oriented x3, affect and mood wnl, intact judgement and insight.       Assessment and Plan. The following treatment and monitoring plan is recommended:      1. Dyslipidemia- mild no meds  Mild, recommended dietary and lifestyle modification.  Also recommend weight loss.  We will continue routine monitoring.    2. Essential hypertension  Chronic, controlled with amlodipine 5 mg daily, will continue.  - Pt was counseled on dietary modification, weight loss, smoking cessation, and avoidance of excessive alcohol consumption.    - Recommended moderate intensity exercise at least 30 minutes per day x 5 days per week.     3. Personal history of malignant neoplasm of breast, left_Meagan  Diagnosed with invasive ductal carcinoma [id number], ER-, OK-, Her/Nu-  s/p Left partial mastectomy and Mico nodes biopsy in 3/2017, declines chemo, did have radiation. Doing well, f/u with Dr. Rhodes. Last mammogram in 10/2018 was normal.     4. Prediabetes  Recent A1c was 5.7.  - Pt was counseled on dietary modification, weight loss   - Recommended moderate intensity exercise at least 30 minutes per day x 5 days per week.  - Follow up in 6 months.     5. Anxiety, declined med  Chronic, controlled with behavioral modification.  Patient is doing well.  She does take Xanax as needed for worsening symptoms, but rarely needs  it.  -Continue Xanax 0.25 mg as needed for worsening anxiety  - Risks, benefits, side effects, as well as potential health complications associated with Xanax discussed with patient. Appropriate counseling provided.  - regular exercise, well-balanced diet, multi-vitamin daily, weight loss, adequate sleep (8 hours per day), meditation, stress management.   - Avoid excessive consumption of stimulants, alcohol, illicit drugs, tobacco     6. Primary osteoarthritis of both knees  Chronic, controlled with Tylenol PRN, denies any active symptoms today.  -Continue over-the-counter analgesics PRN  -Weight loss  -Regular exercise    7. Plantar fasciitis, R  Chronic, mild, controlled with exercises and over-the-counter analgesics PRN.  - rehab exercise provided  - OTC analgesics PRN for pain  - heel lift  - night splint   - appropriate footwear      8. Medicare annual wellness visit, subsequent  - Subsequent Annual Wellness Visit - Includes PPPS ()      Services suggested: No services needed at this time  Health Care Screening: Age-appropriate preventive services recommended by USPTF and ACIP covered by Medicare were discussed today. Services ordered if indicated and agreed upon by the patient.  Referrals offered: Community-based lifestyle interventions to reduce health risks and promote self-management and wellness, fall prevention, nutrition, physical activity, tobacco-use cessation, weight loss, and mental health services as per orders if indicated.    Discussion today about general wellness and lifestyle habits:    · Prevent falls and reduce trip hazards; Cautioned about securing or removing rugs.  · Have a working fire alarm and carbon monoxide detector;   · Engage in regular physical activity and social activities     Follow-up: Return in about 6 months (around 11/15/2019) for Multiple issues.       Please note that this dictation was created using voice recognition software and/or scribes. I have made every  reasonable attempt to correct obvious errors, but I expect that there are errors of grammar and possibly content that I did not discover before finalizing the note.    I, Sammie Street (Scribe), am scribing for, and in the presence of, Hailey Bradshaw M.D.    Electronically signed by: Sammie Street ([place]), 5/15/2019    IHailey M.D. personally performed the services described in this documentation, as scribed by Sammie Street in my presence, and it is both accurate and complete.

## 2019-05-19 ENCOUNTER — HOSPITAL ENCOUNTER (EMERGENCY)
Facility: MEDICAL CENTER | Age: 74
End: 2019-05-19
Attending: EMERGENCY MEDICINE
Payer: COMMERCIAL

## 2019-05-19 ENCOUNTER — APPOINTMENT (OUTPATIENT)
Dept: RADIOLOGY | Facility: MEDICAL CENTER | Age: 74
End: 2019-05-19
Attending: EMERGENCY MEDICINE
Payer: COMMERCIAL

## 2019-05-19 ENCOUNTER — NON-PROVIDER VISIT (OUTPATIENT)
Dept: OCCUPATIONAL MEDICINE | Facility: CLINIC | Age: 74
End: 2019-05-19
Payer: COMMERCIAL

## 2019-05-19 VITALS
HEART RATE: 80 BPM | WEIGHT: 145 LBS | RESPIRATION RATE: 18 BRPM | SYSTOLIC BLOOD PRESSURE: 128 MMHG | DIASTOLIC BLOOD PRESSURE: 78 MMHG | BODY MASS INDEX: 24.89 KG/M2 | TEMPERATURE: 97.4 F | OXYGEN SATURATION: 98 %

## 2019-05-19 DIAGNOSIS — S09.90XA CLOSED HEAD INJURY, INITIAL ENCOUNTER: ICD-10-CM

## 2019-05-19 DIAGNOSIS — S70.01XA CONTUSION OF RIGHT HIP, INITIAL ENCOUNTER: ICD-10-CM

## 2019-05-19 DIAGNOSIS — Z02.83 ENCOUNTER FOR DRUG SCREENING: ICD-10-CM

## 2019-05-19 DIAGNOSIS — S00.03XA HEMATOMA OF SCALP, INITIAL ENCOUNTER: ICD-10-CM

## 2019-05-19 DIAGNOSIS — Z02.1 PRE-EMPLOYMENT DRUG SCREENING: ICD-10-CM

## 2019-05-19 PROCEDURE — 80305 DRUG TEST PRSMV DIR OPT OBS: CPT | Performed by: PREVENTIVE MEDICINE

## 2019-05-19 PROCEDURE — 72125 CT NECK SPINE W/O DYE: CPT

## 2019-05-19 PROCEDURE — 73501 X-RAY EXAM HIP UNI 1 VIEW: CPT | Mod: RT

## 2019-05-19 PROCEDURE — 70450 CT HEAD/BRAIN W/O DYE: CPT

## 2019-05-19 PROCEDURE — 99026 IN-HOSPITAL ON CALL SERVICE: CPT | Performed by: PREVENTIVE MEDICINE

## 2019-05-19 PROCEDURE — 99284 EMERGENCY DEPT VISIT MOD MDM: CPT

## 2019-05-19 PROCEDURE — 82075 ASSAY OF BREATH ETHANOL: CPT | Performed by: PREVENTIVE MEDICINE
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
## 2019-05-19 NOTE — DISCHARGE INSTRUCTIONS
The radiologist noticed a small nodule in your upper lung on the CT scan of your neck.  This will need to be followed up by your primary care physician.  Please contact Dr. Bradshaw tomorrow to arrange follow-up for this upper lung nodule.      Return to the ER for any worsening headache, changing headache, nausea, vomiting, dizziness or lightheadedness, visual changes, lethargy, neck pain, worsening hip pain, numbness/tingling/weakness of extremities, or for any concerns.    Follow-up with the Workmen's Compensation clinic tomorrow.  Please ask your boss at the [employer] which Workmen's Compensation clinic you need to go to.  They will advise you on which clinic to must go to tomorrow.
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
## 2019-05-19 NOTE — ED NOTES
CT resulted. Workmans comp forms completed. Discharge instructions provided.  Pt verbalized the understanding of discharge instructions to follow up with PCP and to return to ER if condition worsens.  Pt ambulated out of ER without difficulty.
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
## 2019-05-21 LAB
AMP AMPHETAMINE: NORMAL
BAR BARBITURATES: NORMAL
BREATH ALCOHOL COMMENT: NORMAL
BZO BENZODIAZEPINES: NORMAL
COC COCAINE: NORMAL
INT CON NEG: NORMAL
INT CON POS: NORMAL
MDMA ECSTASY: NORMAL
MET METHAMPHETAMINES: NORMAL
MTD METHADONE: NORMAL
OPI OPIATES: NORMAL
OXY OXYCODONE: NORMAL
PCP PHENCYCLIDINE: NORMAL
POC BREATHALIZER: 0 PERCENT (ref 0–0.01)
POC URINE DRUG SCREEN OCDRS: NORMAL
THC: NORMAL

## 2019-05-22 ENCOUNTER — OCCUPATIONAL MEDICINE (OUTPATIENT)
Dept: URGENT CARE | Facility: CLINIC | Age: 74
End: 2019-05-22
Payer: COMMERCIAL

## 2019-05-22 VITALS
SYSTOLIC BLOOD PRESSURE: 120 MMHG | HEART RATE: 76 BPM | OXYGEN SATURATION: 98 % | BODY MASS INDEX: 24.75 KG/M2 | RESPIRATION RATE: 20 BRPM | TEMPERATURE: 98.2 F | WEIGHT: 145 LBS | HEIGHT: 64 IN | DIASTOLIC BLOOD PRESSURE: 80 MMHG

## 2019-05-22 DIAGNOSIS — S70.01XA HEMATOMA OF RIGHT HIP, INITIAL ENCOUNTER: ICD-10-CM

## 2019-05-22 DIAGNOSIS — S00.03XA HEMATOMA OF SCALP, INITIAL ENCOUNTER: ICD-10-CM
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
## 2019-05-22 DIAGNOSIS — S09.90XA CLOSED HEAD INJURY, INITIAL ENCOUNTER: ICD-10-CM

## 2019-05-22 PROCEDURE — 99214 OFFICE O/P EST MOD 30 MIN: CPT | Mod: 29 | Performed by: PHYSICIAN ASSISTANT

## 2019-05-22 ASSESSMENT — ENCOUNTER SYMPTOMS
HEADACHES: 1
DOUBLE VISION: 0
BLURRED VISION: 0
PHOTOPHOBIA: 0
NAUSEA: 1
DIZZINESS: 0
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
## 2019-05-23 ENCOUNTER — TELEPHONE (OUTPATIENT)
Dept: URGENT CARE | Facility: PHYSICIAN GROUP | Age: 74
End: 2019-05-23

## 2019-05-23 ENCOUNTER — HOSPITAL ENCOUNTER (OUTPATIENT)
Dept: RADIOLOGY | Facility: MEDICAL CENTER | Age: 74
End: 2019-05-23
Attending: PHYSICIAN ASSISTANT
Payer: MEDICARE

## 2019-05-23 ENCOUNTER — OFFICE VISIT (OUTPATIENT)
Dept: MEDICAL GROUP | Age: 74
End: 2019-05-23
Payer: MEDICARE

## 2019-05-23 VITALS
HEART RATE: 72 BPM | DIASTOLIC BLOOD PRESSURE: 64 MMHG | WEIGHT: 153 LBS | HEIGHT: 64 IN | BODY MASS INDEX: 26.12 KG/M2 | TEMPERATURE: 98.6 F | SYSTOLIC BLOOD PRESSURE: 116 MMHG | OXYGEN SATURATION: 97 %

## 2019-05-23 DIAGNOSIS — R91.1 PULMONARY NODULE: ICD-10-CM

## 2019-05-23 DIAGNOSIS — M76.891 ENTHESOPATHY OF RIGHT HIP REGION: ICD-10-CM

## 2019-05-23 DIAGNOSIS — S06.0X1A CONCUSSION WITH LOSS OF CONSCIOUSNESS OF 30 MINUTES OR LESS, INITIAL ENCOUNTER: Primary | ICD-10-CM
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
## 2019-05-23 DIAGNOSIS — T14.8XXA HEMATOMA: ICD-10-CM

## 2019-05-23 PROCEDURE — 71101 X-RAY EXAM UNILAT RIBS/CHEST: CPT | Mod: RT

## 2019-05-23 PROCEDURE — 99215 OFFICE O/P EST HI 40 MIN: CPT | Performed by: FAMILY MEDICINE

## 2019-05-23 NOTE — PROGRESS NOTES
Subjective:   CC: ER discharge follow up     HPI:     Katie Monroy is a 74 y.o. female who is an established patient of the clinic, presents with the following concerns:     Patient was recently seen 4 days ago at [facility] ED following a ground level fall with head injury. Her CT C-spine, CT head, and Hip xray were negative for acute injury. Incidental finding of 5.5 mm right apex pulmonary nodule. Pt was advised to follow up with me regarding the pulmonary nodule. Pt has hx of breast cancer, now in remission. She denies history of tobacco use and denies second hand exposure to tobacco. She denies shortness of breath, cough, chest pain, weight loss, fever, chills or sweating. Patient had positive loss of consciousness for a few seconds, and states that she was able to remember the incident. She reports to still have residual right-sided headaches, described as pressure to the right side. She additionally reports to have a bruise on the right breast, left arm, right sided hip pain. She has been icing the bruises and takes ibuprofen for pain PRN.  She denies any repeat episodes of loss of consciousness, vomiting, numbness, tingling, confusion, speech changes, vision changes. Patient has been resting at home and sleeping and will not be returning to work for at least two weeks.     Current medicines (including changes today)  Current Outpatient Prescriptions   Medication Sig Dispense Refill   • Multiple Vitamins-Minerals (ONE-A-DAY 50 PLUS PO) Take  by mouth.     • amLODIPine (NORVASC) 5 MG Tab Take 1 Tab by mouth every day. 90 Tab 0   • Coenzyme Q10 (COQ-10 PO) Take 1 Tab by mouth every day.     • cyanocobalamin (VITAMIN B-12) 500 MCG Tab Take 500 mcg by mouth every day.     • vitamin D (CHOLECALCIFEROL) 1000 UNIT Tab Take 1,000 Units by mouth every day.       No current facility-administered medications for this visit.      She  has a past medical history of Arthritis; Asthma; Breast cancer (HCC); Bunion;  "Cancer (HCC); Cancer (HCC) (2017); Hypertension (2017); Irritable bowel syndrome; Plantar fasciitis of right foot; and Prediabetes.    I personally reviewed patient's problem list, allergies, medications, family hx, social hx with patient and update EPIC.     REVIEW OF SYSTEMS:  CONSTITUTIONAL:  Denies night sweats, fever or chills.  RESPIRATORY:  Denies cough, wheeze, hemoptysis, or shortness of breath.  CARDIOVASCULAR:  Denies chest pains, palpitations, pedal edema  GASTROINTESTINAL:  Positive mild nausea, denies vomiting  NEUROLOGIC: Positive headache, denies numbness, tingling, confusion, speech changes, vision changes.  All other systems negative.  See HPI for further details.      Objective:     /64 (BP Location: Right arm, Patient Position: Sitting, BP Cuff Size: Adult)   Pulse 72   Temp 37 °C (98.6 °F) (Temporal)   Ht 1.626 m (5' 4\")   Wt 69.4 kg (153 lb)   SpO2 97%  Body mass index is 26.26 kg/m².    Physical Exam:  Constitutional: awake, alert, in no distress. Overweight with a BMI at 26.26.   Skin: Warm, dry, good turgor, no rashes, bruises, ulcers in visible areas.  Eye: conjunctiva clear, lids neg for edema or lesions.  ENMT: TM and auditory canals wnl. Oral and nasal mucosa wnl. Lips without lesions, good dentition, oropharynx clear.  Neck: Trachea midline, no masses, no thyromegaly. No cervical or supraclavicular lymphadenopathy  Respiratory: Unlabored respiratory effort, lungs clear to auscultation, no wheezes, no rales.  Cardiovascular: Normal S1, S2, no murmur, no pedal edema.  Neuro: CN2-12 grossly intact. Strength 5/5 of all four extremities, reflexes 2/4 in all extremities, no sensory deficits. Normal sensation. No pronator drift. Good finger to nose test. Gait is steady. Balance is steady with eyes closed.   Psych: Oriented x3, affect and mood wnl, intact judgement and insight.     Imaging from ED on 5/19/19    CT-head W/O:   No evidence of acute intracranial process    CT-CSPINE " W/O:   No CT evidence of acute cervical spine abnormality.  Degenerative minimal retrolisthesis C4/5, anterolisthesis C2/3, C7/T1  5.5 mm right apex pulmonary nodule.  Low Risk: No routine follow-up    DX-HIP-UNILATERAL:   No radiographic evidence of acute right hip fracture  Moderate to severe greater trochanteric enthesopathy      Assessment and Plan:   The following treatment plan was discussed    1. Concussion with loss of consciousness of 30 minutes or less, initial encounter  2. Hematoma, R breast, R hip  Pt suffers GLF with brief LOC couple days ago and was seen by ER. Head CT and C-spine CT were negative for acute findings. She was diagnosed with concussion and hematoma of the right lateral chest and right lateral hip. She was discharge in stable condition. She continues to suffer right-sided residue headache and bone associated with hematomas. She manages her symptoms with icing and ibuprofen. She denies any neurological symptoms, profuse vomiting. She still has mild nausea. She has been sleeping and resting more. She takes 2 weeks off from work.   - recommended conservative management for concussion syndrome. Recommended resting, gentle exercises, avoid activities that require sustained mental/physical focus, avoid straining of the eyes, avoid further falls/injuries.  - continue icing of the hematoma and take ibuprofen PRN.   - monitor for development of severe nausea, vomiting, and new neurological symptoms. Seek immediate medical attn as needed.     3. Pulmonary nodule, R apex   Incidental finding of 5.5 mm right apex of the lung was found on exam. Pt has order to have X-ray done outpatient. Pt has hx of breast cancer now in remission.   - CXR    4. Enthesopathy of right hip region_greater trochanter_asymptomatic  Incidental finding of severe enthesopathy of the right hip on hip X-ray. She is asymptomatic. Will monitor.     Patient was seen for 40 minutes face to face of which greater than 50% of  appointment time was spent on counseling and coordination of care regarding the above      Hailey Bradshaw M.D.    Followup: Return for As needed.    Please note that this dictation was created using voice recognition software and/or scribes. I have made every reasonable attempt to correct obvious errors, but I expect that there are errors of grammar and possibly content that I did not discover before finalizing the note.     Bry OBREGON (Scribe), am scribing for, and in the presence of, Hailey Bradshaw M.D.    Electronically signed by: Bry Santizo (Scribe), 5/23/2019    Hailey OBREGON M.D. personally performed the services described in this documentation, as scribed by Bry Santizo in my presence, and it is both accurate and complete.

## 2019-05-24 DIAGNOSIS — R91.8 PULMONARY MASS: ICD-10-CM

## 2019-05-24 DIAGNOSIS — Z85.3 PERSONAL HISTORY OF MALIGNANT NEOPLASM OF BREAST: ICD-10-CM

## 2019-05-28 ENCOUNTER — HOSPITAL ENCOUNTER (OUTPATIENT)
Dept: LAB | Facility: MEDICAL CENTER | Age: 74
End: 2019-05-28
Attending: INTERNAL MEDICINE
Payer: MEDICARE

## 2019-05-28 LAB
BASOPHILS # BLD AUTO: 0.6 % (ref 0–1.8)
BASOPHILS # BLD: 0.05 K/UL (ref 0–0.12)
EOSINOPHIL # BLD AUTO: 0.2 K/UL (ref 0–0.51)
EOSINOPHIL NFR BLD: 2.3 % (ref 0–6.9)
ERYTHROCYTE [DISTWIDTH] IN BLOOD BY AUTOMATED COUNT: 43.2 FL (ref 35.9–50)
HCT VFR BLD AUTO: 46.2 % (ref 37–47)
HGB BLD-MCNC: 15.2 G/DL (ref 12–16)
IMM GRANULOCYTES # BLD AUTO: 0.06 K/UL (ref 0–0.11)
IMM GRANULOCYTES NFR BLD AUTO: 0.7 % (ref 0–0.9)
LYMPHOCYTES # BLD AUTO: 1.79 K/UL (ref 1–4.8)
LYMPHOCYTES NFR BLD: 20.9 % (ref 22–41)
MCH RBC QN AUTO: 29.6 PG (ref 27–33)
MCHC RBC AUTO-ENTMCNC: 32.9 G/DL (ref 33.6–35)
MCV RBC AUTO: 90.1 FL (ref 81.4–97.8)
MONOCYTES # BLD AUTO: 0.65 K/UL (ref 0–0.85)
MONOCYTES NFR BLD AUTO: 7.6 % (ref 0–13.4)
NEUTROPHILS # BLD AUTO: 5.8 K/UL (ref 2–7.15)
NEUTROPHILS NFR BLD: 67.9 % (ref 44–72)
NRBC # BLD AUTO: 0 K/UL
NRBC BLD-RTO: 0 /100 WBC
PLATELET # BLD AUTO: 249 K/UL (ref 164–446)
PMV BLD AUTO: 10.1 FL (ref 9–12.9)
RBC # BLD AUTO: 5.13 M/UL (ref 4.2–5.4)
WBC # BLD AUTO: 8.6 K/UL (ref 4.8–10.8)

## 2019-05-28 PROCEDURE — 80053 COMPREHEN METABOLIC PANEL: CPT

## 2019-05-28 PROCEDURE — 36415 COLL VENOUS BLD VENIPUNCTURE: CPT

## 2019-05-28 PROCEDURE — 85025 COMPLETE CBC W/AUTO DIFF WBC: CPT

## 2019-05-29 ENCOUNTER — OCCUPATIONAL MEDICINE (OUTPATIENT)
Dept: URGENT CARE | Facility: CLINIC | Age: 74
End: 2019-05-29
Payer: COMMERCIAL

## 2019-05-29 VITALS
OXYGEN SATURATION: 96 % | TEMPERATURE: 98.5 F | DIASTOLIC BLOOD PRESSURE: 72 MMHG | HEART RATE: 82 BPM | HEIGHT: 64 IN | SYSTOLIC BLOOD PRESSURE: 118 MMHG | BODY MASS INDEX: 26.12 KG/M2 | RESPIRATION RATE: 16 BRPM | WEIGHT: 153 LBS
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
## 2019-05-29 DIAGNOSIS — S09.90XD CLOSED HEAD INJURY, SUBSEQUENT ENCOUNTER: ICD-10-CM

## 2019-05-29 DIAGNOSIS — S20.211A CONTUSION OF RIGHT CHEST WALL, INITIAL ENCOUNTER: ICD-10-CM

## 2019-05-29 DIAGNOSIS — F07.81 POSTCONCUSSION SYNDROME: ICD-10-CM

## 2019-05-29 DIAGNOSIS — S70.01XD CONTUSION OF RIGHT HIP, SUBSEQUENT ENCOUNTER: ICD-10-CM

## 2019-05-29 DIAGNOSIS — S00.03XD HEMATOMA OF SCALP, SUBSEQUENT ENCOUNTER: ICD-10-CM

## 2019-05-29 LAB
ALBUMIN SERPL BCP-MCNC: 4.5 G/DL (ref 3.2–4.9)
ALBUMIN/GLOB SERPL: 1.5 G/DL
ALP SERPL-CCNC: 71 U/L (ref 30–99)
ALT SERPL-CCNC: 16 U/L (ref 2–50)
ANION GAP SERPL CALC-SCNC: 10 MMOL/L (ref 0–11.9)
AST SERPL-CCNC: 16 U/L (ref 12–45)
BILIRUB SERPL-MCNC: 0.4 MG/DL (ref 0.1–1.5)
BUN SERPL-MCNC: 22 MG/DL (ref 8–22)
CALCIUM SERPL-MCNC: 9.6 MG/DL (ref 8.5–10.5)
CHLORIDE SERPL-SCNC: 104 MMOL/L (ref 96–112)
CO2 SERPL-SCNC: 23 MMOL/L (ref 20–33)
CREAT SERPL-MCNC: 0.65 MG/DL (ref 0.5–1.4)
GLOBULIN SER CALC-MCNC: 3 G/DL (ref 1.9–3.5)
GLUCOSE SERPL-MCNC: 92 MG/DL (ref 65–99)
POTASSIUM SERPL-SCNC: 4.1 MMOL/L (ref 3.6–5.5)
PROT SERPL-MCNC: 7.5 G/DL (ref 6–8.2)
SODIUM SERPL-SCNC: 137 MMOL/L (ref 135–145)

## 2019-05-29 PROCEDURE — 99214 OFFICE O/P EST MOD 30 MIN: CPT | Mod: 29 | Performed by: PHYSICIAN ASSISTANT

## 2019-05-29 ASSESSMENT — ENCOUNTER SYMPTOMS
CHILLS: 0
MYALGIAS: 1
HEADACHES: 1
BLURRED VISION: 0
NAUSEA: 1
COUGH: 0
FOCAL WEAKNESS: 0
TINGLING: 0
VOMITING: 0
DOUBLE VISION: 0
DIARRHEA: 0
PHOTOPHOBIA: 0
SENSORY CHANGE: 0
ABDOMINAL PAIN: 0
SHORTNESS OF BREATH: 0
FEVER: 0
DIZZINESS: 1

## 2019-05-29 ASSESSMENT — PAIN SCALES - GENERAL: PAINLEVEL: 6=MODERATE PAIN

## 2019-05-29 NOTE — PROGRESS NOTES
Subjective:      Katie Monroy is a 74 y.o. female who presents with Follow-Up (w/c follow up DOI 5/19/19 head, rib)      DOI: 5/19/19. Patient is here for follow-up on a fall at work. The patient suffered a closed head injury when she hit her head on the metal table and also hit her right hip. Patient was seen in the ER and cleared with CT scans. At her last visit, she had X-rays of her ribs because she has been experiencing right chest and breast pain. She continue to have right chest wall and breast pain that is worse with movement. She also continues to have intermittent mild headaches, dizziness, and nausea. She denies any worsening headaches, chest pain, weakness, numbness, vision changes. She still feels too dizzy to drive. She is able to walk but continues to have some pain in her right hip. Ibuprofen helps. The patient notes 20% overall improvement.     HPI    Past Medical History:   Diagnosis Date   • Arthritis     osteo   • Asthma     does not use inhalers   • Breast cancer (HCC)    • Bunion     both feet   • Cancer (HCC)     skin   • Cancer (HCC) 2017    breast   • Hypertension 2017    pt states well controlled on meds   • Irritable bowel syndrome    • Plantar fasciitis of right foot    • Prediabetes        Past Surgical History:   Procedure Laterality Date   • MASTECTOMY Left 3/17/2017    Procedure: MASTECTOMY - PARTIAL, WIRE LOCALIZED;  Surgeon: Katy Gastelum M.D.;  Location: SURGERY [facility];  Service:    • NODE BIOPSY SENTINEL Left 3/17/2017    Procedure: NODE BIOPSY SENTINEL;  Surgeon: Katy Gastelum M.D.;  Location: SURGERY [facility];  Service:    • OTHER      right ovary removed       Family History   Problem Relation Age of Onset   • Cancer Mother         leukemia   • Heart Disease Mother         arrhythmia   • Cancer Father         lung   • Lung Disease Father    • No Known Problems Sister    • Diabetes Neg Hx        Allergies   Allergen Reactions   • Tetanus-Diphth-Acell  "Pertussis      States that she had excessive swelling        Medications, Allergies, and current problem list reviewed today in Epic    Review of Systems   Constitutional: Negative for chills, fever and malaise/fatigue.   Eyes: Negative for blurred vision, double vision and photophobia.   Respiratory: Negative for cough and shortness of breath.    Gastrointestinal: Positive for nausea. Negative for abdominal pain, diarrhea and vomiting.   Musculoskeletal: Positive for joint pain and myalgias.        Right breast/chest wall pain.   Skin: Negative for rash.   Neurological: Positive for dizziness and headaches. Negative for tingling, sensory change and focal weakness.          Objective:     /72 (BP Location: Left arm, Patient Position: Sitting, BP Cuff Size: Adult)   Pulse 82   Temp 36.9 °C (98.5 °F) (Temporal)   Resp 16   Ht 1.626 m (5' 4\")   Wt 69.4 kg (153 lb)   SpO2 96%   BMI 26.26 kg/m²      Physical Exam   Constitutional: She appears well-developed and well-nourished. No distress.   Pulmonary/Chest: Effort normal. No respiratory distress.   Psychiatric: She has a normal mood and affect. Judgment and thought content normal.       Vitals reviewed.  Head: Small hematoma on right parietal region of scalp.  Eyes: PERRLA. EOMS intact.   Chest wall: Moderate TTP in right breast. Ecchymosis noted.  Right Hip: TTP over lateral hip with ecchymosis. Slightly antalgic gait.  Neuro: CN II-XII intact. Cerebellar function  intact. Motor coordination intact.  strength strong and symmetrical bilaterally. Proprioception intact. Strength 5/5 equal in the upper and lower extremities. Facial features symmetric with equal movement.          Assessment/Plan:     1. Fall, subsequent encounter    2. Closed head injury, subsequent encounter    3. Postconcussion syndrome    4. Hematoma of scalp, subsequent encounter    5. Contusion of right hip, subsequent encounter    6. Contusion of right chest wall, initial " encounter    Restrictions per D-39  COntinue rest, ice, OTC NSAIDS  RTC 1 week.     Differential diagnoses, Supportive care, and indications for immediate follow-up discussed with patient.   Instructed to return to clinic or nearest emergency department for any change in condition, further concerns, or worsening of symptoms.    The patient demonstrated a good understanding and agreed with the treatment plan.    Mony Berumen P.A.-C.

## 2019-05-29 NOTE — LETTER
[facility]  [address] - [place] [address]  Phone:  [phone] - Fax:  [phone]   [facility] Progress Report and Disability Certification  Date of Service: 5/29/2019   No Show:  No  Date / Time of Next Visit: 5/30/2019   Claim Information   Patient Name: Katie Monroy  Claim Number:     Employer: [employer] Date of Injury: 5/19/2019     Insurer / TPA: [employer]  ID / SSN:     Occupation:   Diagnosis: Diagnoses of Fall, subsequent encounter, Closed head injury, subsequent encounter, Postconcussion syndrome, Hematoma of scalp, subsequent encounter, Contusion of right hip, subsequent encounter, and Contusion of right chest wall, initial encounter were pertinent to this visit.    Medical Information   Related to Industrial Injury? Yes    Subjective Complaints:  DOI: 5/19/19. Patient is here for follow-up on a fall at work. The patient suffered a closed head injury when she hit her head on the metal table and also hit her right hip. Patient was seen in the ER and cleared with CT scans. At her last visit, she had X-rays of her ribs because she has been experiencing right chest and breast pain. She continue to have right chest wall and breast pain that is worse with movement. She also continues to have intermittent mild headaches, dizziness, and nausea. She denies any worsening headaches, chest pain, weakness, numbness, vision changes. She still feels too dizzy to drive. She is able to walk but continues to have some pain in her right hip. Ibuprofen helps. The patient notes 20% overall improvement.   Objective Findings: Vitals reviewed.  Head: Small hematoma on right parietal region of scalp.  Eyes: PERRLA. EOMS intact.   Chest wall: Moderate TTP in right breast. Ecchymosis noted.  Right Hip: TTP over lateral hip with ecchymosis. Slightly antalgic gait.  Neuro: CN II-XII intact. Cerebellar function  intact. Motor  coordination intact.  strength strong and symmetrical bilaterally. Proprioception intact. Strength 5/5 equal in the upper and lower extremities. Facial features symmetric with equal movement.      Pre-Existing Condition(s):     Assessment:   Condition Improved    Status: Additional Care Required  Permanent Disability:No    Plan:   Comments:rest, fluids, OTC Ibuprofen prn    Diagnostics:      Comments:       Disability Information   Status: Released to Restricted Duty    From:  5/29/2019  Through: 5/30/2019 Restrictions are: Temporary   Physical Restrictions   Sitting:    Standing:    Stooping:    Bending:      Squatting:    Walking:    Climbing:    Pushing:      Pulling:    Other:    Reaching Above Shoulder (L):   Reaching Above Shoulder (R):       Reaching Below Shoulder (L):    Reaching Below Shoulder (R):      Not to exceed Weight Limits   Carrying(hrs):   Weight Limit(lb):   Lifting(hrs):   Weight  Limit(lb):     Comments: Take the next week off. Rest. RTC in 1 week.     Repetitive Actions   Hands: i.e. Fine Manipulations from Grasping:     Feet: i.e. Operating Foot Controls:     Driving / Operate Machinery:     Physician Name: Fransisca Berumen P.A.-C. Physician Signature: FRANSISCA Joya P.A.-C. e-Signature: Dr. Francisco Javier Dominguez, Medical Director   Clinic Name / Location: [facility] [address]  [place] [address] Clinic Phone Number: Dept: [phone]   Appointment Time: 1:30 Pm Visit Start Time: 1:35 PM   Check-In Time:  1:20 Pm Visit Discharge Time:  2:08 PM   Original-Treating Physician or Chiropractor    Page 2-Insurer/TPA    Page 3-Employer    Page 4-Employee

## 2019-05-31 ENCOUNTER — HOSPITAL ENCOUNTER (OUTPATIENT)
Dept: RADIOLOGY | Facility: MEDICAL CENTER | Age: 74
End: 2019-05-31
Attending: FAMILY MEDICINE
Payer: MEDICARE

## 2019-05-31 DIAGNOSIS — Z85.3 PERSONAL HISTORY OF MALIGNANT NEOPLASM OF BREAST: ICD-10-CM

## 2019-05-31 DIAGNOSIS — R91.8 PULMONARY MASS: ICD-10-CM

## 2019-05-31 PROCEDURE — A9552 F18 FDG: HCPCS

## 2019-06-05 ENCOUNTER — OCCUPATIONAL MEDICINE (OUTPATIENT)
Dept: URGENT CARE | Facility: CLINIC | Age: 74
End: 2019-06-05
Payer: COMMERCIAL

## 2019-06-05 VITALS
SYSTOLIC BLOOD PRESSURE: 114 MMHG | RESPIRATION RATE: 16 BRPM | HEIGHT: 64 IN | HEART RATE: 86 BPM | WEIGHT: 153 LBS | OXYGEN SATURATION: 94 % | TEMPERATURE: 98 F | DIASTOLIC BLOOD PRESSURE: 72 MMHG | BODY MASS INDEX: 26.12 KG/M2

## 2019-06-05 DIAGNOSIS — S09.90XD CLOSED HEAD INJURY, SUBSEQUENT ENCOUNTER: ICD-10-CM
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
## 2019-06-05 DIAGNOSIS — F07.81 POSTCONCUSSION SYNDROME: ICD-10-CM

## 2019-06-05 DIAGNOSIS — S00.03XD HEMATOMA OF SCALP, SUBSEQUENT ENCOUNTER: ICD-10-CM

## 2019-06-05 DIAGNOSIS — S20.211A CONTUSION OF RIGHT CHEST WALL, INITIAL ENCOUNTER: ICD-10-CM

## 2019-06-05 DIAGNOSIS — S70.01XD CONTUSION OF RIGHT HIP, SUBSEQUENT ENCOUNTER: ICD-10-CM

## 2019-06-05 PROCEDURE — 99213 OFFICE O/P EST LOW 20 MIN: CPT | Mod: 29 | Performed by: PHYSICIAN ASSISTANT

## 2019-06-05 ASSESSMENT — ENCOUNTER SYMPTOMS
SENSORY CHANGE: 0
FEVER: 0
SEIZURES: 0
FOCAL WEAKNESS: 0
NAUSEA: 0
HEADACHES: 0
DIZZINESS: 0
CHILLS: 0
TINGLING: 0
VOMITING: 0
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
## 2019-06-11 ENCOUNTER — HOSPITAL ENCOUNTER (INPATIENT)
Facility: MEDICAL CENTER | Age: 74
LOS: 1 days | DRG: 199 | End: 2019-06-12
Attending: INTERNAL MEDICINE | Admitting: HOSPITALIST
Payer: MEDICARE

## 2019-06-11 ENCOUNTER — APPOINTMENT (OUTPATIENT)
Dept: RADIOLOGY | Facility: MEDICAL CENTER | Age: 74
DRG: 199 | End: 2019-06-11
Attending: RADIOLOGY
Payer: MEDICARE

## 2019-06-11 ENCOUNTER — APPOINTMENT (OUTPATIENT)
Dept: RADIOLOGY | Facility: MEDICAL CENTER | Age: 74
DRG: 199 | End: 2019-06-11
Attending: INTERNAL MEDICINE
Payer: MEDICARE

## 2019-06-11 DIAGNOSIS — C50.412 MALIGNANT NEOPLASM OF UPPER-OUTER QUADRANT OF LEFT FEMALE BREAST, UNSPECIFIED ESTROGEN RECEPTOR STATUS (HCC): ICD-10-CM

## 2019-06-11 PROBLEM — J96.01 ACUTE HYPOXEMIC RESPIRATORY FAILURE (HCC): Status: ACTIVE | Noted: 2019-06-11

## 2019-06-11 PROBLEM — J93.9 PNEUMOTHORAX: Status: ACTIVE | Noted: 2019-06-11

## 2019-06-11 LAB
INR PPP: 0.99 (ref 0.87–1.13)
PATHOLOGY CONSULT NOTE: NORMAL
PROTHROMBIN TIME: 13.3 SEC (ref 12–14.6)

## 2019-06-11 PROCEDURE — 94760 N-INVAS EAR/PLS OXIMETRY 1: CPT

## 2019-06-11 PROCEDURE — 71045 X-RAY EXAM CHEST 1 VIEW: CPT

## 2019-06-11 PROCEDURE — 700111 HCHG RX REV CODE 636 W/ 250 OVERRIDE (IP)

## 2019-06-11 PROCEDURE — 85610 PROTHROMBIN TIME: CPT

## 2019-06-11 PROCEDURE — A9270 NON-COVERED ITEM OR SERVICE: HCPCS

## 2019-06-11 PROCEDURE — 700102 HCHG RX REV CODE 250 W/ 637 OVERRIDE(OP): Performed by: HOSPITALIST

## 2019-06-11 PROCEDURE — 0BBC3ZX EXCISION OF RIGHT UPPER LUNG LOBE, PERCUTANEOUS APPROACH, DIAGNOSTIC: ICD-10-PCS | Performed by: RADIOLOGY

## 2019-06-11 PROCEDURE — 160002 HCHG RECOVERY MINUTES (STAT)

## 2019-06-11 PROCEDURE — 77012 CT SCAN FOR NEEDLE BIOPSY: CPT | Mod: RT

## 2019-06-11 PROCEDURE — 700105 HCHG RX REV CODE 258: Performed by: RADIOLOGY

## 2019-06-11 PROCEDURE — 99223 1ST HOSP IP/OBS HIGH 75: CPT | Performed by: HOSPITALIST

## 2019-06-11 PROCEDURE — A9270 NON-COVERED ITEM OR SERVICE: HCPCS | Performed by: HOSPITALIST

## 2019-06-11 PROCEDURE — 88342 IMHCHEM/IMCYTCHM 1ST ANTB: CPT

## 2019-06-11 PROCEDURE — 88305 TISSUE EXAM BY PATHOLOGIST: CPT

## 2019-06-11 PROCEDURE — 88341 IMHCHEM/IMCYTCHM EA ADD ANTB: CPT | Mod: 91

## 2019-06-11 PROCEDURE — 700111 HCHG RX REV CODE 636 W/ 250 OVERRIDE (IP): Performed by: RADIOLOGY

## 2019-06-11 PROCEDURE — 4410213 CT-CHEST TUBE FOR PNEUMO

## 2019-06-11 PROCEDURE — 700102 HCHG RX REV CODE 250 W/ 637 OVERRIDE(OP)

## 2019-06-11 PROCEDURE — 770006 HCHG ROOM/CARE - MED/SURG/GYN SEMI*

## 2019-06-11 PROCEDURE — 0W9930Z DRAINAGE OF RIGHT PLEURAL CAVITY WITH DRAINAGE DEVICE, PERCUTANEOUS APPROACH: ICD-10-PCS | Performed by: RADIOLOGY

## 2019-06-11 PROCEDURE — 700111 HCHG RX REV CODE 636 W/ 250 OVERRIDE (IP): Performed by: HOSPITALIST

## 2019-06-11 RX ORDER — ONDANSETRON 2 MG/ML
4 INJECTION INTRAMUSCULAR; INTRAVENOUS ONCE
Status: COMPLETED | OUTPATIENT
Start: 2019-06-11 | End: 2019-06-11

## 2019-06-11 RX ORDER — HYDROMORPHONE HYDROCHLORIDE 2 MG/ML
0.5 INJECTION, SOLUTION INTRAMUSCULAR; INTRAVENOUS; SUBCUTANEOUS
Status: ACTIVE | OUTPATIENT
Start: 2019-06-11 | End: 2019-06-12

## 2019-06-11 RX ORDER — MIDAZOLAM HYDROCHLORIDE 1 MG/ML
INJECTION INTRAMUSCULAR; INTRAVENOUS
Status: COMPLETED
Start: 2019-06-11 | End: 2019-06-11

## 2019-06-11 RX ORDER — MIDAZOLAM HYDROCHLORIDE 1 MG/ML
.5-2 INJECTION INTRAMUSCULAR; INTRAVENOUS PRN
Status: DISCONTINUED | OUTPATIENT
Start: 2019-06-11 | End: 2019-06-11 | Stop reason: HOSPADM

## 2019-06-11 RX ORDER — HEPARIN SODIUM 5000 [USP'U]/ML
5000 INJECTION, SOLUTION INTRAVENOUS; SUBCUTANEOUS EVERY 8 HOURS
Status: DISCONTINUED | OUTPATIENT
Start: 2019-06-11 | End: 2019-06-12 | Stop reason: HOSPADM

## 2019-06-11 RX ORDER — SODIUM CHLORIDE 9 MG/ML
INJECTION, SOLUTION INTRAVENOUS CONTINUOUS
Status: DISCONTINUED | OUTPATIENT
Start: 2019-06-11 | End: 2019-06-12 | Stop reason: HOSPADM

## 2019-06-11 RX ORDER — HYDROMORPHONE HYDROCHLORIDE 2 MG/ML
INJECTION, SOLUTION INTRAMUSCULAR; INTRAVENOUS; SUBCUTANEOUS
Status: DISPENSED
Start: 2019-06-11 | End: 2019-06-11

## 2019-06-11 RX ORDER — HYDROMORPHONE HYDROCHLORIDE 2 MG/ML
0.5 INJECTION, SOLUTION INTRAMUSCULAR; INTRAVENOUS; SUBCUTANEOUS
Status: DISCONTINUED | OUTPATIENT
Start: 2019-06-11 | End: 2019-06-12 | Stop reason: HOSPADM

## 2019-06-11 RX ORDER — BISACODYL 10 MG
10 SUPPOSITORY, RECTAL RECTAL
Status: DISCONTINUED | OUTPATIENT
Start: 2019-06-11 | End: 2019-06-12 | Stop reason: HOSPADM

## 2019-06-11 RX ORDER — OXYCODONE HYDROCHLORIDE 5 MG/1
5 TABLET ORAL
Status: ACTIVE | OUTPATIENT
Start: 2019-06-11 | End: 2019-06-12

## 2019-06-11 RX ORDER — OXYCODONE HYDROCHLORIDE 5 MG/1
TABLET ORAL
Status: COMPLETED
Start: 2019-06-11 | End: 2019-06-11

## 2019-06-11 RX ORDER — OXYCODONE HYDROCHLORIDE 10 MG/1
10 TABLET ORAL
Status: DISCONTINUED | OUTPATIENT
Start: 2019-06-11 | End: 2019-06-12 | Stop reason: HOSPADM

## 2019-06-11 RX ORDER — OXYCODONE HYDROCHLORIDE 5 MG/1
5 TABLET ORAL
Status: DISCONTINUED | OUTPATIENT
Start: 2019-06-11 | End: 2019-06-12 | Stop reason: HOSPADM

## 2019-06-11 RX ORDER — ONDANSETRON 2 MG/ML
INJECTION INTRAMUSCULAR; INTRAVENOUS
Status: DISPENSED
Start: 2019-06-11 | End: 2019-06-11

## 2019-06-11 RX ORDER — AMLODIPINE BESYLATE 10 MG/1
5 TABLET ORAL DAILY
Status: DISCONTINUED | OUTPATIENT
Start: 2019-06-11 | End: 2019-06-12

## 2019-06-11 RX ORDER — ONDANSETRON 2 MG/ML
4 INJECTION INTRAMUSCULAR; INTRAVENOUS PRN
Status: DISCONTINUED | OUTPATIENT
Start: 2019-06-11 | End: 2019-06-11 | Stop reason: HOSPADM

## 2019-06-11 RX ORDER — ONDANSETRON 2 MG/ML
4 INJECTION INTRAMUSCULAR; INTRAVENOUS EVERY 8 HOURS PRN
Status: DISPENSED | OUTPATIENT
Start: 2019-06-11 | End: 2019-06-12

## 2019-06-11 RX ORDER — AMOXICILLIN 250 MG
2 CAPSULE ORAL 2 TIMES DAILY
Status: DISCONTINUED | OUTPATIENT
Start: 2019-06-11 | End: 2019-06-12 | Stop reason: HOSPADM

## 2019-06-11 RX ORDER — POLYETHYLENE GLYCOL 3350 17 G/17G
1 POWDER, FOR SOLUTION ORAL
Status: DISCONTINUED | OUTPATIENT
Start: 2019-06-11 | End: 2019-06-12 | Stop reason: HOSPADM

## 2019-06-11 RX ORDER — SODIUM CHLORIDE 9 MG/ML
500 INJECTION, SOLUTION INTRAVENOUS
Status: DISCONTINUED | OUTPATIENT
Start: 2019-06-11 | End: 2019-06-11 | Stop reason: HOSPADM

## 2019-06-11 RX ORDER — HYDROMORPHONE HYDROCHLORIDE 2 MG/ML
INJECTION, SOLUTION INTRAMUSCULAR; INTRAVENOUS; SUBCUTANEOUS
Status: COMPLETED
Start: 2019-06-11 | End: 2019-06-11

## 2019-06-11 RX ORDER — ONDANSETRON 2 MG/ML
INJECTION INTRAMUSCULAR; INTRAVENOUS
Status: DISPENSED
Start: 2019-06-11 | End: 2019-06-12

## 2019-06-11 RX ADMIN — ONDANSETRON 4 MG: 2 INJECTION INTRAMUSCULAR; INTRAVENOUS at 23:31

## 2019-06-11 RX ADMIN — OXYCODONE HYDROCHLORIDE 5 MG: 5 TABLET ORAL at 11:56

## 2019-06-11 RX ADMIN — OXYCODONE HYDROCHLORIDE 10 MG: 10 TABLET ORAL at 18:20

## 2019-06-11 RX ADMIN — FENTANYL CITRATE 25 MCG: 50 INJECTION INTRAMUSCULAR; INTRAVENOUS at 08:45

## 2019-06-11 RX ADMIN — MIDAZOLAM HYDROCHLORIDE 0.5 MG: 1 INJECTION, SOLUTION INTRAMUSCULAR; INTRAVENOUS at 08:45

## 2019-06-11 RX ADMIN — SODIUM CHLORIDE: 9 INJECTION, SOLUTION INTRAVENOUS at 21:32

## 2019-06-11 RX ADMIN — HYDROMORPHONE HYDROCHLORIDE 0.5 MG: 2 INJECTION, SOLUTION INTRAMUSCULAR; INTRAVENOUS; SUBCUTANEOUS at 09:10

## 2019-06-11 RX ADMIN — MIDAZOLAM HYDROCHLORIDE 1 MG: 1 INJECTION, SOLUTION INTRAMUSCULAR; INTRAVENOUS at 13:00

## 2019-06-11 RX ADMIN — HYDROMORPHONE HYDROCHLORIDE 0.5 MG: 2 INJECTION, SOLUTION INTRAMUSCULAR; INTRAVENOUS; SUBCUTANEOUS at 13:37

## 2019-06-11 RX ADMIN — ONDANSETRON 4 MG: 2 INJECTION INTRAMUSCULAR; INTRAVENOUS at 09:49

## 2019-06-11 RX ADMIN — FENTANYL CITRATE 50 MCG: 50 INJECTION INTRAMUSCULAR; INTRAVENOUS at 08:35

## 2019-06-11 RX ADMIN — MIDAZOLAM HYDROCHLORIDE 1 MG: 1 INJECTION, SOLUTION INTRAMUSCULAR; INTRAVENOUS at 08:35

## 2019-06-11 RX ADMIN — OXYCODONE HYDROCHLORIDE 10 MG: 10 TABLET ORAL at 23:30

## 2019-06-11 RX ADMIN — MIDAZOLAM HYDROCHLORIDE 1 MG: 1 INJECTION, SOLUTION INTRAMUSCULAR; INTRAVENOUS at 08:23

## 2019-06-11 RX ADMIN — OXYCODONE HYDROCHLORIDE 5 MG: 5 TABLET ORAL at 15:21

## 2019-06-11 RX ADMIN — HYDROMORPHONE HYDROCHLORIDE 0.5 MG: 2 INJECTION, SOLUTION INTRAMUSCULAR; INTRAVENOUS; SUBCUTANEOUS at 21:25

## 2019-06-11 RX ADMIN — AMLODIPINE BESYLATE 5 MG: 10 TABLET ORAL at 23:39

## 2019-06-11 RX ADMIN — SODIUM CHLORIDE: 9 INJECTION, SOLUTION INTRAVENOUS at 06:51

## 2019-06-11 RX ADMIN — FENTANYL CITRATE 50 MCG: 50 INJECTION INTRAMUSCULAR; INTRAVENOUS at 08:23

## 2019-06-11 RX ADMIN — ONDANSETRON 4 MG: 2 INJECTION INTRAMUSCULAR; INTRAVENOUS at 13:40

## 2019-06-11 ASSESSMENT — COPD QUESTIONNAIRES
COPD SCREENING SCORE: 5
DO YOU EVER COUGH UP ANY MUCUS OR PHLEGM?: YES, EVERY DAY
DURING THE PAST 4 WEEKS HOW MUCH DID YOU FEEL SHORT OF BREATH: SOME OF THE TIME
HAVE YOU SMOKED AT LEAST 100 CIGARETTES IN YOUR ENTIRE LIFE: NO/DON'T KNOW

## 2019-06-11 ASSESSMENT — LIFESTYLE VARIABLES
EVER_SMOKED: NEVER
EVER_SMOKED: NEVER
ALCOHOL_USE: NO
SUBSTANCE_ABUSE: 0

## 2019-06-11 ASSESSMENT — ENCOUNTER SYMPTOMS
HEARTBURN: 0
DOUBLE VISION: 0
HEMOPTYSIS: 0
SHORTNESS OF BREATH: 1
COUGH: 0
FOCAL WEAKNESS: 0
FLANK PAIN: 0
CHILLS: 0
BLURRED VISION: 0
BRUISES/BLEEDS EASILY: 0
MYALGIAS: 0
DIZZINESS: 0
SPEECH CHANGE: 0
EYE DISCHARGE: 0
WEAKNESS: 0
SENSORY CHANGE: 0
PALPITATIONS: 0
NAUSEA: 0
HALLUCINATIONS: 0
VOMITING: 0
ABDOMINAL PAIN: 0
DEPRESSION: 0
FEVER: 0

## 2019-06-11 ASSESSMENT — COGNITIVE AND FUNCTIONAL STATUS - GENERAL
DAILY ACTIVITIY SCORE: 24
SUGGESTED CMS G CODE MODIFIER DAILY ACTIVITY: CH
MOBILITY SCORE: 21
WALKING IN HOSPITAL ROOM: A LITTLE
SUGGESTED CMS G CODE MODIFIER MOBILITY: CJ
CLIMB 3 TO 5 STEPS WITH RAILING: A LITTLE
STANDING UP FROM CHAIR USING ARMS: A LITTLE

## 2019-06-11 ASSESSMENT — PATIENT HEALTH QUESTIONNAIRE - PHQ9
1. LITTLE INTEREST OR PLEASURE IN DOING THINGS: NOT AT ALL
2. FEELING DOWN, DEPRESSED, IRRITABLE, OR HOPELESS: NOT AT ALL
SUM OF ALL RESPONSES TO PHQ9 QUESTIONS 1 AND 2: 0

## 2019-06-11 NOTE — OR SURGEON
Immediate Post- Operative Note        PostOp Diagnosis: RIGHT lung nodule      Procedure(s): CT biopsy of same      Estimated Blood Loss: Less than 5 ml        Complications: tiny RIGHT pneumothorax, small amount of alveolar hemorrhage            6/11/2019     8:52 AM     Rio Carnes

## 2019-06-11 NOTE — OR SURGEON
Immediate Post- Operative Note        PostOp Diagnosis: RIGHT ptx      Procedure(s): RIGHT chest tube      Estimated Blood Loss: Less than 5 ml        Complications: None        Paged admitting hospitalist    6/11/2019     1:27 PM     Rio Carnes

## 2019-06-11 NOTE — DISCHARGE INSTRUCTIONS
Discharge Instructions    Discharged to home by car with relative. Discharged via wheelchair, hospital escort: Yes.  Special equipment needed: Not Applicable    Be sure to schedule a follow-up appointment with your primary care doctor or any specialists as instructed.     Discharge Plan:   Diet Plan: Discussed  Activity Level: Discussed  Confirmed Follow up Appointment: Patient to Call and Schedule Appointment  Confirmed Symptoms Management: Discussed  Medication Reconciliation Updated: Yes  Influenza Vaccine Indication: Indicated: Not available from distributor/    I understand that a diet low in cholesterol, fat, and sodium is recommended for good health. Unless I have been given specific instructions below for another diet, I accept this instruction as my diet prescription.   Other diet: Regular diet as tolerated    Special Instructions:     Monitor for signs and symptoms of infection (fever, chills, nausea, vomiting)  Monitor incisions for swelling, redness, or drainage      Pneumothorax  Introduction  A pneumothorax, commonly called a collapsed lung, is a condition in which air leaks from a lung and builds up in the space between the lung and the chest wall (pleural space). The air in a pneumothorax is trapped outside the lung and takes up space, preventing the lung from fully expanding. This is a condition that usually occurs suddenly. The buildup of air may be small or large. A small pneumothorax may go away on its own. When a pneumothorax is larger, it will often require medical treatment and hospitalization.  What are the causes?  A pneumothorax can sometimes happen quickly with no apparent cause. People with underlying lung problems, particularly COPD or emphysema, are at higher risk of pneumothorax. However, pneumothorax can happen quickly even in people with no prior known lung problems. Trauma, surgery, medical procedures, or injury to the chest wall can also cause a pneumothorax.  What are  the signs or symptoms?  Sometimes a pneumothorax will have no symptoms. When symptoms are present, they can include:  · Chest pain.  · Shortness of breath.  · Increased rate of breathing.  · Bluish color to your lips or skin (cyanosis).  How is this diagnosed?  Pneumothorax is usually diagnosed by a chest X-ray or chest CT scan. Your health care provider will also take a medical history and perform a physical exam to determine why you may have a pneumothorax.  How is this treated?  A small pneumothorax may go away on its own without treatment. Extra oxygen can sometimes help a small pneumothorax go away more quickly. For a larger pneumothorax or a pneumothorax that is causing symptoms, a procedure is usually needed to drain the air. In some cases, the health care provider may drain the air using a needle. In other cases, a chest tube may be inserted into the pleural space. A chest tube is a small tube placed between the ribs and into the pleural space. This removes the extra air and allows the lung to expand back to its normal size. A large pneumothorax will usually require a hospital stay. If there is ongoing air leakage into the pleural space, then the chest tube may need to remain in place for several days until the air leak has healed. In some cases, surgery may be needed.  Follow these instructions at home:  · Only take over-the-counter or prescription medicines as directed by your health care provider.  · If a cough or pain makes it difficult for you to sleep at night, try sleeping in a semi-upright position in a recliner or by using 2 or 3 pillows.  · Rest and limit activity as directed by your health care provider.  · If you had a chest tube and it was removed, ask your health care provider when it is okay to remove the dressing. Until your health care provider says you can remove the dressing, do not allow it to get wet.  · Do not smoke. Smoking is a risk factor for pneumothorax.  · Do not fly in an  airplane or scuba dive until your health care provider says it is okay.  · Follow up with your health care provider as directed.  Get help right away if:  · You have increasing chest pain or shortness of breath.  · You have a cough that is not controlled with suppressants.  · You begin coughing up blood.  · You have pain that is getting worse or is not controlled with medicines.  · You cough up thick, discolored mucus (sputum) that is yellow to green in color.  · You have redness, increasing pain, or discharge at the site where a chest tube had been in place (if your pneumothorax was treated with a chest tube).  · The site where your chest tube was located opens up.  · You feel air coming out of the site where the chest tube was placed.  · You have a fever or persistent symptoms for more than 2-3 days.  · You have a fever and your symptoms suddenly get worse.  This information is not intended to replace advice given to you by your health care provider. Make sure you discuss any questions you have with your health care provider.  Document Released: 12/18/2006 Document Revised: 05/25/2017 Document Reviewed: 05/13/2015  © 2017 Elsevier      Depression / Suicide Risk    As you are discharged from this [facility] facility, it is important to learn how to keep safe from harming yourself.    Recognize the warning signs:  · Abrupt changes in personality, positive or negative- including increase in energy   · Giving away possessions  · Change in eating patterns- significant weight changes-  positive or negative  · Change in sleeping patterns- unable to sleep or sleeping all the time   · Unwillingness or inability to communicate  · Depression  · Unusual sadness, discouragement and loneliness  · Talk of wanting to die  · Neglect of personal appearance   · Rebelliousness- reckless behavior  · Withdrawal from people/activities they love  · Confusion- inability to concentrate     If you or a loved one observes any of these  behaviors or has concerns about self-harm, here's what you can do:  · Talk about it- your feelings and reasons for harming yourself  · Remove any means that you might use to hurt yourself (examples: pills, rope, extension cords, firearm)  · Get professional help from the community (Mental Health, Substance Abuse, psychological counseling)  · Do not be alone:Call your Safe Contact- someone whom you trust who will be there for you.  · Call your local CRISIS HOTLINE 103-3880 or 642-507-2505  · Call your local Children's Mobile Crisis Response Team [place] (504) 290-4060 or www.[employer]  · Call the toll free National Suicide Prevention Hotlines   · National Suicide Prevention Lifeline 039-624-OQWT (5063)  · [place] Line Network 800-SUICIDE (607-0778)            ACTIVITY: Rest and take it easy for the first 24 hours.  A responsible adult is recommended to remain with you during that time.  It is normal to feel sleepy.  We encourage you to not do anything that requires balance, judgment or coordination.    MILD FLU-LIKE SYMPTOMS ARE NORMAL. YOU MAY EXPERIENCE GENERALIZED MUSCLE ACHES, THROAT IRRITATION, HEADACHE AND/OR SOME NAUSEA.    FOR 24 HOURS DO NOT:  Drive, operate machinery or run household appliances.  Drink beer or alcoholic beverages.   Make important decisions or sign legal documents.    SPECIAL INSTRUCTIONS: keep incision dry for 24 hours    DIET: To avoid nausea, slowly advance diet as tolerated, avoiding spicy or greasy foods for the first day.  Add more substantial food to your diet according to your physician's instructions.  Babies can be fed formula or breast milk as soon as they are hungry.  INCREASE FLUIDS AND FIBER TO AVOID CONSTIPATION.    SURGICAL DRESSING/BATHING: do not shower for 24 hours, may shower tomorrow 6/12/2019 after 9am    FOLLOW-UP APPOINTMENT:  A follow-up appointment should be arranged with your doctor Meagan at [phone]; call to schedule.    You should CALL  YOUR PHYSICIAN if you develop:  Fever greater than 101 degrees F.  Pain not relieved by medication, or persistent nausea or vomiting.  Excessive bleeding (blood soaking through dressing) or unexpected drainage from the wound.  Extreme redness or swelling around the incision site, drainage of pus or foul smelling drainage.  Inability to urinate or empty your bladder within 8 hours.  Problems with breathing or chest pain.    You should call 911 if you develop problems with breathing or chest pain.  If you are unable to contact your doctor or surgical center, you should go to the nearest emergency room or urgent care center.  Physician's telephone #: [phone]    If any questions arise, call your doctor.  If your doctor is not available, please feel free to call the Surgical Center at [phone].  The Center is open Monday through Friday from 7AM to 7PM.  You can also call the [facility] HOTLINE open 24 hours/day, 7 days/week and speak to a nurse at [phone], or toll free at [phone].    A registered nurse may call you a few days after your surgery to see how you are doing after your procedure.    MEDICATIONS: Resume taking daily medication.  Take prescribed pain medication with food.  If no medication is prescribed, you may take non-aspirin pain medication if needed.  PAIN MEDICATION CAN BE VERY CONSTIPATING.  Take a stool softener or laxative such as senokot, pericolace, or milk of magnesia if needed.    If your physician has prescribed pain medication that includes Acetaminophen (Tylenol), do not take additional Acetaminophen (Tylenol) while taking the prescribed medication.    Depression / Suicide Risk    As you are discharged from this [facility] facility, it is important to learn how to keep safe from harming yourself.    Recognize the warning signs:  · Abrupt changes in personality, positive or negative- including increase in energy   · Giving away possessions  · Change in eating patterns-  significant weight changes-  positive or negative  · Change in sleeping patterns- unable to sleep or sleeping all the time   · Unwillingness or inability to communicate  · Depression  · Unusual sadness, discouragement and loneliness  · Talk of wanting to die  · Neglect of personal appearance   · Rebelliousness- reckless behavior  · Withdrawal from people/activities they love  · Confusion- inability to concentrate     If you or a loved one observes any of these behaviors or has concerns about self-harm, here's what you can do:  · Talk about it- your feelings and reasons for harming yourself  · Remove any means that you might use to hurt yourself (examples: pills, rope, extension cords, firearm)  · Get professional help from the community (Mental Health, Substance Abuse, psychological counseling)  · Do not be alone:Call your Safe Contact- someone whom you trust who will be there for you.  · Call your local CRISIS HOTLINE 999-1240 or 012-252-1254  · Call your local Children's Mobile Crisis Response Team [place] (017) 495-8944 or www[employer]  · Call the toll free National Suicide Prevention Hotlines   · National Suicide Prevention Lifeline 607-962-NDPG (8083)  · National Hope Line Network 800-SUICIDE (376-9248)    Lung Biopsy  A lung biopsy is a procedure in which a tissue sample is removed from the lung. The tissue can be examined under a microscope to help diagnose various lung disorders.   LET YOUR HEALTH CARE PROVIDER KNOW ABOUT:  · Any allergies you have.  · All medicines you are taking, including vitamins, herbs, eye drops, creams, and over-the-counter medicines.  · Previous problems you or members of your family have had with the use of anesthetics.  · Any blood disorders or bleeding problems that you have.  · Previous surgeries you have had.  · Medical conditions you have.  RISKS AND COMPLICATIONS  Generally, a lung biopsy is a safe procedure. However, problems can occur and include:  · Collapse  of the lung.    · Bleeding.    · Infection.    BEFORE THE PROCEDURE  · Do not eat or drink anything after midnight on the night before the procedure or as directed by your health care provider.  · Ask your health care provider about changing or stopping your regular medicines. This is especially important if you are taking diabetes medicines or blood thinners.  · Plan to have someone take you home after the procedure.  PROCEDURE  Various methods can be used to perform a lung biopsy:   · Needle biopsy. A biopsy needle is inserted into the lung. The needle is used to collect the tissue sample. A CT scanner may be used to guide the needle to the right place in the lung. For this method, a medicine is used to numb the area where the biopsy sample will be taken (local anesthetic).  · Bronchoscopy. A flexible tube (bronchoscope) is inserted into your lungs by going through your mouth or nose. A needle or forceps is passed through the bronchoscope to remove the tissue sample. For this method, medicine may be used to numb the back of your throat.  · Open biopsy. A cut (incision) is made in your chest. The tissue sample is then removed using surgical tools. The incision is closed with skin glue, skin adhesive strips, or stitches. For this method, you will be given medicine to make you sleep through the procedure (general anesthetic).  AFTER THE PROCEDURE  · Your recovery will be assessed and monitored.  · You might have soreness and tenderness at the site of the biopsy for a few days after the procedure.  · You might have a cough and some soreness in your throat for a few days if a bronchoscope was used.  This information is not intended to replace advice given to you by your health care provider. Make sure you discuss any questions you have with your health care provider.  Document Released: 03/08/2006 Document Revised: 01/08/2016 Document Reviewed: 06/01/2014  DTT Interactive Patient Education © 2017 [employer]

## 2019-06-11 NOTE — ASSESSMENT & PLAN NOTE
Post procedural pneumothorax s/p Chest tube   Dr. Carnes will follow chest tube per my discussion with him, cindy on pleurovac  repeact cxr in am   Pain control   resp care protocol

## 2019-06-11 NOTE — PROGRESS NOTES
Patient taken to CT4 for chest tube placement.  Pt assisted onto procedure table, monitors placed, safety straps utilized and pressure points padded.  R Chest Tube placement completed, per Dr. Carnes.  Patient tolerated well. Chest tube to suction as ordered by MD.  PercuStay dressing applied.  Patient assisted back to stretcher and transported to PPU on O2 at 2L/NC.  Report to CHICA Harry.      R Chest Tube  Flexima APDL  Locking Pigtail  8F x 25cm    REF [id number]  EXP 2022-03-14

## 2019-06-11 NOTE — OR NURSING
0905 Pt report received from IR nurse, pt transported over on [facility] with RN. Pt placed on monitor and right upper chest incision site reviewed with IR nurse site is clean, dry and soft, no S/S of bleeding. Instructed pt that there will be a follow up chest x-ray, if clear and no pneumothorax then pt may ave something to eat and drink.      1020 follow up chest x-ray done    1040 results of chest X-ray showed a small right pneumothorax, Spoke with Dr. Carnes regarding results, MD ok'd for pt to have food and water.      1208 second follow chest X-ray done    1215 called and spoke with dr. Carnes regarding last chest x-ray results, MD stated he would be over to speak with pt and family about going back for a chest tube placement.     1230 MD at bedside    1244 Pt taken back to IR with RN Mei for chest tube placement.     1323 pt back from procedure, postitive for nausea and vomiting and pain. Pt medicated per MD orders. Chest tube placed on -20 wall suction as per MD order, bed order placed    1710 pt report given to accepting RN on floor, pt and family updated on transfer to room, pt transported with RN to floor.

## 2019-06-11 NOTE — H&P
Hospital Medicine History & Physical Note    Date of Service  6/11/2019    Primary Care Physician  Hailey Bradshaw M.D.    Consultants  Dr. Carnes     Code Status  full    Chief Complaint  Shortness of breath    History of Presenting Illness  74 y.o. female who presented 6/11/2019 with past medical history of breast cancer status post resection and radiation follows with Dr. Rhodes who presents with right sided chest pain.  This patient had a suspicious pulmonary nodule that was biopsied earlier today.  Shortly after developed shortness of breath chest pain with evidence of pneumothorax.  She had repeat x-ray with worsening of right-sided pneumothorax.  Subsequently had a chest tube placed.  She will now be admitted to the hospital with acute hypoxemic respiratory failure with chest tube in place.  Otherwise she has no known alleviating or exacerbating factors to her symptoms.    Review of Systems  Review of Systems   Constitutional: Negative for chills and fever.   HENT: Negative for congestion, hearing loss and tinnitus.    Eyes: Negative for blurred vision, double vision and discharge.   Respiratory: Positive for shortness of breath. Negative for cough and hemoptysis.    Cardiovascular: Positive for chest pain. Negative for palpitations and leg swelling.   Gastrointestinal: Negative for abdominal pain, heartburn, nausea and vomiting.   Genitourinary: Negative for dysuria and flank pain.   Musculoskeletal: Negative for joint pain and myalgias.   Skin: Negative for rash.   Neurological: Negative for dizziness, sensory change, speech change, focal weakness and weakness.   Endo/Heme/Allergies: Negative for environmental allergies. Does not bruise/bleed easily.   Psychiatric/Behavioral: Negative for depression, hallucinations and substance abuse.       Past Medical History   has a past medical history of Arthritis; Asthma; Breast cancer (HCC); Bunion; Cancer (HCC); Cancer (HCC) (2017); Hypertension (2017); Irritable bowel  syndrome; Plantar fasciitis of right foot; and Prediabetes.    Surgical History   has a past surgical history that includes other; mastectomy (Left, 3/17/2017); and node biopsy sentinel (Left, 3/17/2017).     Family History  family history includes Cancer in her father and mother; Heart Disease in her mother; Lung Disease in her father; No Known Problems in her sister.     Social History   reports that she has never smoked. She has never used smokeless tobacco. She reports that she drinks alcohol. She reports that she does not use drugs.    Allergies  Allergies   Allergen Reactions   • Tetanus-Diphth-Acell Pertussis      States that she had excessive swelling        Medications  Prior to Admission Medications   Prescriptions Last Dose Informant Patient Reported? Taking?   Coenzyme Q10 (COQ-10 PO) 6/10/2019 at Unknown time Patient Yes No   Sig: Take 1 Tab by mouth every day.   Multiple Vitamins-Minerals (ONE-A-DAY 50 PLUS PO) 6/10/2019 at Unknown time  Yes No   Sig: Take  by mouth.   amLODIPine (NORVASC) 5 MG Tab 6/10/2019 at Unknown time  No No   Sig: Take 1 Tab by mouth every day.   cyanocobalamin (VITAMIN B-12) 500 MCG Tab 6/10/2019 at Unknown time Patient Yes No   Sig: Take 500 mcg by mouth every day.   vitamin D (CHOLECALCIFEROL) 1000 UNIT Tab 6/10/2019 at Unknown time Patient Yes No   Sig: Take 1,000 Units by mouth every day.      Facility-Administered Medications: None       Physical Exam  Temp:  [36.1 °C (97 °F)-36.2 °C (97.2 °F)] 36.1 °C (97 °F)  Pulse:  [71-88] 72  Resp:  [16-20] 16  BP: (121-139)/(57-75) 139/60  SpO2:  [94 %-98 %] 97 %    Physical Exam   Constitutional: She is oriented to person, place, and time. She appears well-developed and well-nourished. She appears distressed.   HENT:   Head: Normocephalic and atraumatic.   Eyes: Pupils are equal, round, and reactive to light. Conjunctivae and EOM are normal.   Neck: Normal range of motion. Neck supple. No JVD present.   Cardiovascular: Normal rate,  regular rhythm, normal heart sounds and intact distal pulses.    No murmur heard.  Pulmonary/Chest: Effort normal and breath sounds normal. She exhibits tenderness.   Diminished right sided breath sounds, chest tube in place   Abdominal: Soft. Bowel sounds are normal. She exhibits no distension. There is no tenderness.   Musculoskeletal: Normal range of motion. She exhibits no edema.   Neurological: She is alert and oriented to person, place, and time. She exhibits normal muscle tone.   Skin: Skin is warm and dry.   Psychiatric: She has a normal mood and affect. Her behavior is normal. Judgment and thought content normal.   Nursing note and vitals reviewed.      Laboratory:          No results for input(s): ALTSGPT, ASTSGOT, ALKPHOSPHAT, TBILIRUBIN, DBILIRUBIN, GAMMAGT, AMYLASE, LIPASE, ALB, PREALBUMIN, GLUCOSE in the last 72 hours.  Recent Labs      06/11/19   0720   INR  0.99             No results for input(s): TROPONINI in the last 72 hours.    Urinalysis:    No results found     Imaging:  DX-CHEST-PORTABLE (1 VIEW)   Final Result      Right pneumothorax is increased in size compared to the prior examination.      Mild left basilar atelectasis.      Left lower lobe nodule is again noted.      Atherosclerotic plaque.      Right upper lobe nodule with perilesional postprocedural hematoma which is not significantly changed compared to prior.      Findings communicated to Dr. Carnes      DX-CHEST-PORTABLE (1 VIEW)   Final Result      Small right pneumothorax.      Right upper lobe mass with perilesional postprocedural hematoma.      Left lower lobe nodule is also noted.      Mild bibasilar atelectasis.      Atherosclerotic plaque.      Findings discussed with Dr. Carnes.      CT-NEEDLE BX-LUNG-MEDIASTINUM RIGHT   Final Result      1.  CT-guided RIGHT upper lobe lung biopsy   2.  Deployment of [employer] pleural plug   3.  If the specimen is inadequate we might repeat this procedure with a more assertive sedation  regimen initially to minimize patient motion         CT-CHEST TUBE FOR PNEUMO    (Results Pending)   DX-CHEST-PORTABLE (1 VIEW)    (Results Pending)         Assessment/Plan:  I anticipate this patient will require at least two midnights for appropriate medical management, necessitating inpatient admission.    * Pneumothorax   Assessment & Plan    Post procedural pneumothorax s/p Chest tube   Dr. Carnes will follow chest tube per my discussion with him, currenlty on pleurovac  repeact cxr in am   Pain control   resp care protocol      Acute hypoxemic respiratory failure (HCC)   Assessment & Plan    Wean 02 as tolerated  Cont to wean Chest tube  resp care protocol ordered     Pulmonary mass- (present on admission)   Assessment & Plan    S/p biopsy today   Cont outpatient follow up      Personal history of malignant neoplasm of breast, left_Meagan- (present on admission)   Assessment & Plan    S/p resection and biopsy 2017  Follows with Dr. Rhodes   S/p radguero      Essential hypertension- (present on admission)   Assessment & Plan    Resume home norvasc          VTE prophylaxis: heparin

## 2019-06-12 ENCOUNTER — APPOINTMENT (OUTPATIENT)
Dept: RADIOLOGY | Facility: MEDICAL CENTER | Age: 74
DRG: 199 | End: 2019-06-12
Attending: RADIOLOGY
Payer: MEDICARE

## 2019-06-12 ENCOUNTER — APPOINTMENT (OUTPATIENT)
Dept: RADIOLOGY | Facility: MEDICAL CENTER | Age: 74
DRG: 199 | End: 2019-06-12
Attending: NURSE PRACTITIONER
Payer: MEDICARE

## 2019-06-12 ENCOUNTER — PATIENT OUTREACH (OUTPATIENT)
Dept: HEALTH INFORMATION MANAGEMENT | Facility: OTHER | Age: 74
End: 2019-06-12

## 2019-06-12 ENCOUNTER — APPOINTMENT (OUTPATIENT)
Dept: RADIOLOGY | Facility: MEDICAL CENTER | Age: 74
DRG: 199 | End: 2019-06-12
Attending: HOSPITALIST
Payer: MEDICARE

## 2019-06-12 VITALS
TEMPERATURE: 98.7 F | HEIGHT: 63 IN | BODY MASS INDEX: 27.34 KG/M2 | WEIGHT: 154.32 LBS | OXYGEN SATURATION: 96 % | RESPIRATION RATE: 16 BRPM | DIASTOLIC BLOOD PRESSURE: 59 MMHG | HEART RATE: 65 BPM | SYSTOLIC BLOOD PRESSURE: 139 MMHG

## 2019-06-12 PROBLEM — J96.01 ACUTE HYPOXEMIC RESPIRATORY FAILURE (HCC): Status: RESOLVED | Noted: 2019-06-11 | Resolved: 2019-06-12

## 2019-06-12 PROBLEM — J93.9 PNEUMOTHORAX: Status: RESOLVED | Noted: 2019-06-11 | Resolved: 2019-06-12

## 2019-06-12 LAB
ALBUMIN SERPL BCP-MCNC: 3.7 G/DL (ref 3.2–4.9)
ALBUMIN/GLOB SERPL: 1.3 G/DL
ALP SERPL-CCNC: 59 U/L (ref 30–99)
ALT SERPL-CCNC: 11 U/L (ref 2–50)
ANION GAP SERPL CALC-SCNC: 8 MMOL/L (ref 0–11.9)
AST SERPL-CCNC: 13 U/L (ref 12–45)
BASOPHILS # BLD AUTO: 0.3 % (ref 0–1.8)
BASOPHILS # BLD: 0.03 K/UL (ref 0–0.12)
BILIRUB SERPL-MCNC: 0.6 MG/DL (ref 0.1–1.5)
BUN SERPL-MCNC: 12 MG/DL (ref 8–22)
CALCIUM SERPL-MCNC: 8.7 MG/DL (ref 8.5–10.5)
CHLORIDE SERPL-SCNC: 105 MMOL/L (ref 96–112)
CO2 SERPL-SCNC: 24 MMOL/L (ref 20–33)
CREAT SERPL-MCNC: 0.49 MG/DL (ref 0.5–1.4)
EOSINOPHIL # BLD AUTO: 0.11 K/UL (ref 0–0.51)
EOSINOPHIL NFR BLD: 0.9 % (ref 0–6.9)
ERYTHROCYTE [DISTWIDTH] IN BLOOD BY AUTOMATED COUNT: 45.1 FL (ref 35.9–50)
GLOBULIN SER CALC-MCNC: 2.9 G/DL (ref 1.9–3.5)
GLUCOSE SERPL-MCNC: 134 MG/DL (ref 65–99)
HCT VFR BLD AUTO: 41.8 % (ref 37–47)
HGB BLD-MCNC: 13.2 G/DL (ref 12–16)
IMM GRANULOCYTES # BLD AUTO: 0.07 K/UL (ref 0–0.11)
IMM GRANULOCYTES NFR BLD AUTO: 0.6 % (ref 0–0.9)
LYMPHOCYTES # BLD AUTO: 0.88 K/UL (ref 1–4.8)
LYMPHOCYTES NFR BLD: 7.4 % (ref 22–41)
MCH RBC QN AUTO: 29 PG (ref 27–33)
MCHC RBC AUTO-ENTMCNC: 31.6 G/DL (ref 33.6–35)
MCV RBC AUTO: 91.9 FL (ref 81.4–97.8)
MONOCYTES # BLD AUTO: 0.85 K/UL (ref 0–0.85)
MONOCYTES NFR BLD AUTO: 7.2 % (ref 0–13.4)
NEUTROPHILS # BLD AUTO: 9.88 K/UL (ref 2–7.15)
NEUTROPHILS NFR BLD: 83.6 % (ref 44–72)
NRBC # BLD AUTO: 0 K/UL
NRBC BLD-RTO: 0 /100 WBC
PLATELET # BLD AUTO: 202 K/UL (ref 164–446)
PMV BLD AUTO: 9.7 FL (ref 9–12.9)
POTASSIUM SERPL-SCNC: 3.3 MMOL/L (ref 3.6–5.5)
PROT SERPL-MCNC: 6.6 G/DL (ref 6–8.2)
RBC # BLD AUTO: 4.55 M/UL (ref 4.2–5.4)
SODIUM SERPL-SCNC: 137 MMOL/L (ref 135–145)
WBC # BLD AUTO: 11.8 K/UL (ref 4.8–10.8)

## 2019-06-12 PROCEDURE — 36415 COLL VENOUS BLD VENIPUNCTURE: CPT

## 2019-06-12 PROCEDURE — 71045 X-RAY EXAM CHEST 1 VIEW: CPT

## 2019-06-12 PROCEDURE — A9270 NON-COVERED ITEM OR SERVICE: HCPCS | Performed by: HOSPITALIST

## 2019-06-12 PROCEDURE — 700102 HCHG RX REV CODE 250 W/ 637 OVERRIDE(OP): Performed by: HOSPITALIST

## 2019-06-12 PROCEDURE — 80053 COMPREHEN METABOLIC PANEL: CPT

## 2019-06-12 PROCEDURE — 700111 HCHG RX REV CODE 636 W/ 250 OVERRIDE (IP): Performed by: RADIOLOGY

## 2019-06-12 PROCEDURE — 700111 HCHG RX REV CODE 636 W/ 250 OVERRIDE (IP): Performed by: HOSPITALIST

## 2019-06-12 PROCEDURE — 85025 COMPLETE CBC W/AUTO DIFF WBC: CPT

## 2019-06-12 PROCEDURE — 99239 HOSP IP/OBS DSCHRG MGMT >30: CPT | Performed by: HOSPITALIST

## 2019-06-12 PROCEDURE — 700105 HCHG RX REV CODE 258: Performed by: RADIOLOGY

## 2019-06-12 RX ORDER — AMLODIPINE BESYLATE 10 MG/1
5 TABLET ORAL NIGHTLY
Status: DISCONTINUED | OUTPATIENT
Start: 2019-06-12 | End: 2019-06-12 | Stop reason: HOSPADM

## 2019-06-12 RX ORDER — ALPRAZOLAM 0.5 MG/1
0.5 TABLET ORAL ONCE
Status: COMPLETED | OUTPATIENT
Start: 2019-06-12 | End: 2019-06-12

## 2019-06-12 RX ADMIN — ALPRAZOLAM 0.5 MG: 0.5 TABLET ORAL at 13:31

## 2019-06-12 RX ADMIN — OXYCODONE HYDROCHLORIDE 10 MG: 10 TABLET ORAL at 05:35

## 2019-06-12 RX ADMIN — HYDROMORPHONE HYDROCHLORIDE 0.5 MG: 2 INJECTION, SOLUTION INTRAMUSCULAR; INTRAVENOUS; SUBCUTANEOUS at 02:08

## 2019-06-12 RX ADMIN — ONDANSETRON 4 MG: 2 INJECTION INTRAMUSCULAR; INTRAVENOUS at 07:25

## 2019-06-12 RX ADMIN — SODIUM CHLORIDE: 9 INJECTION, SOLUTION INTRAVENOUS at 05:32

## 2019-06-12 NOTE — PROGRESS NOTES
"Rt Lung biopsy 6/11/19 in IR with Ti Carnes MD with iatrogenic right PNTX requiring chest tube and admission for management. Pathology pending. AM CXR shows no evidence of PNTX. No air leak on exam. Pt denies dyspnea, however reports severe pain \"all night\" from chest tube radiating to her back. Has multiple medication requests. Spouse at bedside.     Chest tube clamped. S/s of PNTX reviewed with pt and spouse. Will check XR in 1 hour. If no PNTX or air leak, will d/c chest tube. Reviewed with pt's bedside RN: if pt develops dyspnea, CP with inspiration, or O2 desats: unclamp tube and then notify IR NP or MD x [phone]. From IR standpoint, she may take NSDAIDs if needed for pain control.  "

## 2019-06-12 NOTE — DISCHARGE SUMMARY
Discharge Summary    CHIEF COMPLAINT ON ADMISSION  Pneumothorax    Reason for Admission  Malignant neoplasm     Admission Date  6/11/2019    CODE STATUS  Full Code    HPI & HOSPITAL COURSE  This is a 74 y.o. female here with past medical history of breast cancer status post resection and radiation follows with Dr. Rhodes who presents with right sided chest pain.  This patient had a suspicious pulmonary nodule that was biopsied earlier today.  Shortly after developed shortness of breath chest pain with evidence of pneumothorax.  She had repeat x-ray with worsening of right-sided pneumothorax.  Subsequently had a chest tube placed.  She will now be admitted to the hospital with acute hypoxemic respiratory failure with chest tube in place.  Otherwise she has no known alleviating or exacerbating factors to her symptoms.    She was admitted. A chest tube was in place and was able to be removed the following day, earlier than anticipated. She was able to be titrated off oxygen completely and a post-removal CXR showed no recurrence of pneumothorax. She was anxious for discharge home. With her admitting complaint resolved, therefore, she is discharged in fair and stable condition to home with close outpatient follow-up.    The patient recovered much more quickly than anticipated on admission.    Discharge Date  06/12/19         DISCHARGE DIAGNOSES  Principal Problem (Resolved):    Pneumothorax POA: Yes  Active Problems:    Essential hypertension POA: Yes    Personal history of malignant neoplasm of breast, left_Meagan POA: Yes      Overview: Diagnosed with invasive ductal carcinoma [id number], ER-, [place]-, Her/Nu-      s/p Left partial mastectomy and [place] nodes biopsy in 3/2017, declines       chemo, did have radiation.       Doing well, f/u with Dr. Rhodes    Pulmonary mass POA: Yes      Overview: Pulmonary masses noted on chest x-ray done on May 23, 2019.  Patient has       history of breast cancer.  She was advised to  contact her oncologist.        PET/CT was ordered urgently.  Resolved Problems:    Acute hypoxemic respiratory failure (HCC) POA: Yes      FOLLOW UP  No future appointments.  Hailey Bradshaw M.D.  [address] [place] [address] [address] [id number] [phone]    In 1 week        MEDICATIONS ON DISCHARGE     Medication List      CONTINUE taking these medications      Instructions   amLODIPine 5 MG Tabs  Commonly known as:  NORVASC   Take 1 Tab by mouth every day.  Dose:  5 mg     COQ-10 PO   Take 1 Tab by mouth every day.  Dose:  1 Tab     cyanocobalamin 500 MCG Tabs  Commonly known as:  VITAMIN B-12   Take 500 mcg by mouth every day.  Dose:  500 mcg     ONE-A-DAY 50 PLUS PO   Take  by mouth.     vitamin D 1000 UNIT Tabs  Commonly known as:  cholecalciferol   Take 1,000 Units by mouth every day.  Dose:  1000 Units            Allergies  Allergies   Allergen Reactions   • Tetanus-Diphth-Acell Pertussis      States that she had excessive swelling        DIET  Orders Placed This Encounter   Procedures   • Diet Order Regular     Standing Status:   Standing     Number of Occurrences:   1     Order Specific Question:   Diet:     Answer:   Regular [1]       ACTIVITY  As tolerated.  Weight bearing as tolerated    CONSULTATIONS  Interventional radiology    PROCEDURES  CT-guided right lung biopsy, 6/11    LABORATORY  Lab Results   Component Value Date    SODIUM 137 06/12/2019    POTASSIUM 3.3 (L) 06/12/2019    CHLORIDE 105 06/12/2019    CO2 24 06/12/2019    GLUCOSE 134 (H) 06/12/2019    BUN 12 06/12/2019    CREATININE 0.49 (L) 06/12/2019        Lab Results   Component Value Date    WBC 11.8 (H) 06/12/2019    HEMOGLOBIN 13.2 06/12/2019    HEMATOCRIT 41.8 06/12/2019    PLATELETCT 202 06/12/2019        Total time of the discharge process exceeds 38 minutes.

## 2019-06-12 NOTE — PROGRESS NOTES
Pt is A&O x4, patient noted to be anxious  Pain 9/10 to right side chest and shoulder location of chest tube, medicated per MAR and provided ice pack  Patient complains of nausea  IR chest tube to right side anterior chest  + Urine Void   + Flatus  Last bm PTA per patient  Up with 1 person assist    SCD's off, patient refuses despite educaiton   at bedside during shift exchange  Educated patient on importance of calling of assistance as needed  Reviewed plan of care with patient, bed in lowest position and locked, pt resting comfortably now, call light within reach, all needs met at this time

## 2019-06-12 NOTE — PROGRESS NOTES
Patient refused SCDs, educated regarding importance.  Education reinforced by CHICA Soler.  Patient continues to refuse.

## 2019-06-12 NOTE — PROGRESS NOTES
Patient reports episode of nausea and vomiting, 100 mL brown, thin liquid. Pt declines prn nausea/vomting medication at this time. Provided ice chips and and wash cloths per patient's request.

## 2019-06-12 NOTE — CONSULTS
S/p RIGHT lung biopsy on 6/11/19 with pneumothorax and IR placement of 8 Fr chest tube by Ti Carnes MD. Pathology pending.     Serial CXRs reviewed with Dilip Moreno MD (IR, x [phone]). Pt denies chest pain or dyspnea. O2 sats 96%. No radiographic evidence of right PNTX with chest tube clamped. No air leak on exam. Chest tube removed with pt inspiring during removal. No removal technical difficulty, occlusive dressing placed. Site care instructions reviewed. Pt understands to call bedside RN for development of chest pain, subcutaneous air, or dyspnea s/p chest tube removal. From IR standpoint, pt may resume all activity. Will obtain post removal XR.

## 2019-06-12 NOTE — PROGRESS NOTES
Discharging Patient home per physician order.  Discharged with .  Demonstrated understanding of discharge instructions, follow up appointments, home medications, home care for surgical wound and nursing care instructions.  Ambulating without assistance, voiding without difficulty, pain well controlled, tolerating oral medications, oxygen saturation greater than 90%, tolerating diet.  Educational handouts given and discussed.  Verbalized understanding of discharge instructions and educational handouts.  All questions answered.  Belongings with patient at time of discharge.

## 2019-06-12 NOTE — CARE PLAN
Problem: Safety  Goal: Will remain free from falls  Outcome: PROGRESSING AS EXPECTED  Fall risk precautions in place. Bed in lowest position, call light within reach, treaded slipper socks on pt, and appropriate communication signs on door. Educate patient on importance of calling for assistance as needed    Problem: Pain Management  Goal: Pain level will decrease to patient's comfort goal  Outcome: PROGRESSING SLOWER THAN EXPECTED  Assess patient's pain level at a minimum every 4 hours and as needed. Medicate patient for pain per MAR as ordered by MD. Provide non pharmacological interventions as needed

## 2019-06-12 NOTE — PROGRESS NOTES
Patient arrived to floor via gurney from PPU. Patient awake and alert, but quite nauseous still. Patient ambulated from gurney to bed and immediately vomited 200 ml. Anterior right chest tube placed to wall suction. IR dressing to chest is clean, dry and intact. All other skin is intact.  at bedside. Admit profile completed. Patient oriented to room, unit routine and call light. No other needs at this time.

## 2019-06-12 NOTE — RESPIRATORY CARE
COPD EDUCATION by COPD CLINICAL EDUCATOR  6/12/2019 at 7:45 AM by Myriam Argueta     Patient reviewed by COPD education team. Patient does not qualify for the COPD program, no history per patient.

## 2019-06-12 NOTE — PROGRESS NOTES
Assessment completed.  Pt is A&O x4. Pt on 2LO2  Medicating for pain PRN per MAR, states pain is tolerable at this time  Medicated for nausea this am.   - numbness, - tingling.  R.anterior CT in place to suction, no sign of leak     Last BM PTA per pt.    +void.  Regular diet, has poor appetite.   Pt up with SBA, steady gait.   Pt very anxious  Call light within reach. All needs met at this time. Fall Precautions and hourly rounding in place.  at bedside.

## 2019-06-13 ENCOUNTER — PATIENT OUTREACH (OUTPATIENT)
Dept: HEALTH INFORMATION MANAGEMENT | Facility: OTHER | Age: 74
End: 2019-06-13

## 2019-06-14 ENCOUNTER — TELEPHONE (OUTPATIENT)
Dept: MEDICAL GROUP | Age: 74
End: 2019-06-14

## 2019-06-14 NOTE — TELEPHONE ENCOUNTER
ESTABLISHED PATIENT PRE-VISIT PLANNING     Patient was NOT contacted to complete PVP.     Note: Patient will not be contacted if there is no indication to call.     1.  Reviewed notes from the last few office visits within the medical group: Yes    2.  If any orders were placed at last visit or intended to be done for this visit (i.e. 6 mos follow-up), do we have Results/Consult Notes?        •  Labs - Labs ordered, completed on 6/19 and results are in chart.   Note: If patient appointment is for lab review and patient did not complete labs, check with provider if OK to reschedule patient until labs completed.       •  Imaging - Imaging ordered, completed and results are in chart.       •  Referrals - Referral ordered, patient has NOT been seen.    3. Is this appointment scheduled as a Hospital Follow-Up? Yes, visit was at [facility].     4.  Immunizations were updated in Epic using WebIZ?: Epic matches WebIZ       •  Web Iz Recommendations: SHINGRIX (Shingles)    5.  Patient is due for the following Health Maintenance Topics:   Health Maintenance Due   Topic Date Due   • IMM ZOSTER VACCINES (2 of 3) 12/30/2011       - Patient is up-to-date on all Health Maintenance topics. No records have been requested at this time.    6. Orders for overdue Health Maintenance topics pended in Pre-Charting? N\A    7.  AHA (MDX) form printed for Provider? No, already completed    8.  Patient was NOT informed to arrive 15 min prior to their scheduled appointment and bring in their medication bottles.

## 2019-06-18 DIAGNOSIS — I10 ESSENTIAL HYPERTENSION: ICD-10-CM

## 2019-06-18 RX ORDER — AMLODIPINE BESYLATE 5 MG/1
TABLET ORAL
Qty: 100 TAB | Refills: 1 | Status: SHIPPED | OUTPATIENT
Start: 2019-06-18 | End: 2019-09-19

## 2019-06-19 ENCOUNTER — OFFICE VISIT (OUTPATIENT)
Dept: MEDICAL GROUP | Age: 74
End: 2019-06-19
Payer: MEDICARE

## 2019-06-19 VITALS
BODY MASS INDEX: 27.68 KG/M2 | TEMPERATURE: 98.6 F | SYSTOLIC BLOOD PRESSURE: 110 MMHG | HEART RATE: 77 BPM | DIASTOLIC BLOOD PRESSURE: 64 MMHG | OXYGEN SATURATION: 94 % | WEIGHT: 150.4 LBS | HEIGHT: 62 IN

## 2019-06-19 DIAGNOSIS — C50.919 METASTATIC BREAST CARCINOMA: ICD-10-CM

## 2019-06-19 DIAGNOSIS — Z09 HOSPITAL DISCHARGE FOLLOW-UP: Primary | ICD-10-CM

## 2019-06-19 DIAGNOSIS — J96.01 ACUTE RESPIRATORY FAILURE WITH HYPOXIA (HCC): ICD-10-CM

## 2019-06-19 DIAGNOSIS — J95.811 POSTPROCEDURAL PNEUMOTHORAX: ICD-10-CM

## 2019-06-19 DIAGNOSIS — F43.0 ACUTE STRESS DISORDER: ICD-10-CM

## 2019-06-19 PROBLEM — R91.1 PULMONARY NODULE: Status: RESOLVED | Noted: 2019-05-23 | Resolved: 2019-06-19

## 2019-06-19 PROBLEM — R91.8 PULMONARY MASS: Status: RESOLVED | Noted: 2019-05-24 | Resolved: 2019-06-19

## 2019-06-19 PROCEDURE — 99215 OFFICE O/P EST HI 40 MIN: CPT | Performed by: FAMILY MEDICINE

## 2019-06-19 RX ORDER — CITALOPRAM HYDROBROMIDE 10 MG/1
10 TABLET ORAL DAILY
Qty: 90 TAB | Refills: 1 | Status: SHIPPED | OUTPATIENT
Start: 2019-06-19 | End: 2019-07-18

## 2019-06-19 NOTE — PROGRESS NOTES
Subjective:   CC: Hospital discharge follow-up    HPI:     Katie Monroy is a 74 y.o. female who is an established patient of the clinic, presents with the following concerns:     Diagnosed with invasive ductal carcinoma [id number], ER-, [place]-, Her/Nu- s/p Left partial mastectomy and [place] nodes biopsy in 3/2017.  She declined chemo, but did have radiation.     Patient was in remission until May 2019 when she suffers ground-level fall.  Chest x-ray was obtained.  Multiple pulmonary nodules was seen on chest x-ray concerning for metastatic breast cancer.  This diagnosis is confirmed by PET/CT.  Patient was admitted to the hospital on June 11, 2018 for lung biopsy.  Shortly after the biopsy, she develop shortness of breath, chest pain.  Chest x-ray was concerning for pneumothorax.  She had a chest tube placed and was admitted to the hospital for acute hypoxemic respiratory failure.  The chest tube was removed the following day and patient was titrated off oxygen completely.  Pulse removal chest x-ray showed no recurrent of pneumothorax.  She was discharged the following day in stable condition.  Today, patient reports doing well.  She denies chest pain, shortness of breath, dyspnea on exertion.  Patient was seen by Dr. Rhodes yesterday.  Chemotherapy is planned.  Patient is accompanied by her .  She has a lot of questions regarding her condition.  She is depressed and anxious about the diagnosis.  She received excellent support from her  and family.    Current medicines (including changes today)  Current Outpatient Prescriptions   Medication Sig Dispense Refill   • citalopram (CELEXA) 10 MG tablet Take 1 Tab by mouth every day. 90 Tab 1   • amLODIPine (NORVASC) 5 MG Tab TAKE ONE TABLET BY MOUTH EVERY  Tab 1   • Multiple Vitamins-Minerals (ONE-A-DAY 50 PLUS PO) Take  by mouth.     • Coenzyme Q10 (COQ-10 PO) Take 1 Tab by mouth every day.     • cyanocobalamin (VITAMIN B-12) 500 MCG Tab Take 500 mcg  "by mouth every day.     • vitamin D (CHOLECALCIFEROL) 1000 UNIT Tab Take 1,000 Units by mouth every day.       No current facility-administered medications for this visit.      She  has a past medical history of Arthritis; Asthma; Breast cancer (HCC); Bunion; Cancer (HCC); Cancer (HCC) (2017); Hypertension (2017); Irritable bowel syndrome; Plantar fasciitis of right foot; and Prediabetes.    I personally reviewed patient's problem list, allergies, medications, family hx, social hx with patient and update EPIC.     REVIEW OF SYSTEMS:  CONSTITUTIONAL:  Denies night sweats, fatigue, malaise, lethargy, fever or chills.  RESPIRATORY:  Denies wheeze, hemoptysis, or shortness of breath.  CARDIOVASCULAR:  Denies palpitations, pedal edema     Objective:     /64 (BP Location: Right arm, Patient Position: Sitting, BP Cuff Size: Adult)   Pulse 77   Temp 37 °C (98.6 °F) (Temporal)   Ht 1.575 m (5' 2\")   Wt 68.2 kg (150 lb 6.4 oz)   SpO2 94%  Body mass index is 27.51 kg/m².    Physical Exam:  Constitutional: awake, alert, in no distress.  Skin: Warm, dry, good turgor, no rashes, bruises, ulcers in visible areas.  Eye: conjunctiva clear, lids neg for edema or lesions.  Neck: Trachea midline, no masses, no thyromegaly. No cervical or supraclavicular lymphadenopathy  Respiratory: Unlabored respiratory effort, lungs clear to auscultation, no wheezes, no rales.  Cardiovascular: Normal S1, S2, no murmur, no pedal edema.  Psych: Oriented x3, emotional, intact judgement and insight.       Assessment and Plan:   The following treatment plan was discussed    1. Hospital discharge follow-up  2. Metastatic breast carcinoma (HCC)  3. Postprocedural pneumothorax  4. Acute respiratory failure with hypoxia (HCC)  5. Acute stress disorder  74-year-old female who was diagnosed with invasive ductal carcinoma [id number], ER-, [place]-, Her/Nu-, s/p Left partial mastectomy and sentinel nodes biopsy in 3/2017.  She declined chemotherapy, but did " have radiation.  She had a ground-level fall couple weeks ago.  Patient was seen by ER.  Incidental findings of multiple pulmonary nodules were found on chest x-ray concerning for metastatic breast cancer.  Follow-up PET/CT confirmed this diagnosis.  Patient underwent lung biopsy on June 11, 2019.  Shortly after the procedure, she developed acute shortness of breath and was found to have acute respiratory failure with hypoxia secondary to pneumothorax.  A chest tube was placed and patient was admitted to the hospital for observation.  She did well the following day.  The chest tube was removed and she was discharged from the hospital in stable condition.  Today, patient reports doing well.  She denies any active cardiopulmonary symptoms.  She is afebrile, hemodynamically stable, sat 94% on room air, cardiopulmonary exam was unremarkable.  Patient was seen by oncology yesterday.  Chemotherapy is planned.  She is very emotional throughout the visit.  She is accompanied by her .  I had a long conversation with them about the diagnosis.  Coping strategies were also discussed with patient.  She is interested in pharmacotherapy to help her with depression/anxiety related to acute stress.  I will also refer her to behavioral health for consultation.  She receives excellent support from family.  - citalopram (CELEXA) 10 MG tablet; Take 1 Tab by mouth every day.  Dispense: 90 Tab; Refill: 1  - REFERRAL TO BEHAVIORAL HEALTH    -Follow-up with oncology as directed  -Follow-up PRN    Patient was seen for 40 minutes face to face of which greater than 50% of appointment time was spent on counseling and coordination of care regarding the above     Ly JOSEPH Bradshaw M.D.    Followup: Return for As needed.    Please note that this dictation was created using voice recognition software and/or scribes. I have made every reasonable attempt to correct obvious errors, but I expect that there are errors of grammar and possibly content  that I did not discover before finalizing the note.     IBaldomero (Scribe), am scribing for, and in the presence of, Hailey Bradshaw M.D.    Electronically signed by: Baldomero Jarquin (Aneta), 6/19/2019    Hailey OBREGON M.D. personally performed the services described in this documentation, as scribed by Baldomero Jarquin in my presence, and it is both accurate and complete.

## 2019-06-24 NOTE — DOCUMENTATION QUERY
[facility]                                                                       Query Response Note      PATIENT:               CARLI HASSAN  ACCT #:                  [id number]  MRN:                     [id number]  :                      1945  ADMIT DATE:       2019 5:48 AM  DISCH DATE:        2019 5:19 PM  RESPONDING  PROVIDER #:        [id number]           QUERY TEXT:    The finding of metastatic breast carcinoma is documented in the pathology report.    Per coding guidelines, coders cannot code diagnosis from the pathology report without the Attending Physician's documentation of the diagnosis. Based on clinical findings, risk factors and treatment, can this diagnosis be further specified?    NOTE:  If an appropriate response is not listed below, please respond with a new note.        The patient's Clinical Indicators include:  Patient with a history of left breast cancer underwent biopsy of right lung mass.  No diagnosis from the final pathology is mentioned in the record.                     SURGICAL PATHOLOGY CONSULTATION    FINAL DIAGNOSIS:    A. Right lung mass biopsy cores:         Metastatic breast carcinoma.    Comment: Studies for HER2 have been requested.  The findings will be  reported in an addendum.    Query created by: Oly Thompson on 2019 9:25 AM    RESPONSE TEXT:    Agree with pathology finding of metastatic breast carcinoma          Electronically signed by:  DUSTY MEMBRENO MD 2019 9:08 AM

## 2019-07-08 ENCOUNTER — TELEPHONE (OUTPATIENT)
Dept: HEALTH INFORMATION MANAGEMENT | Facility: OTHER | Age: 74
End: 2019-07-08

## 2019-07-08 DIAGNOSIS — C50.919 METASTATIC BREAST CARCINOMA: ICD-10-CM

## 2019-07-08 NOTE — TELEPHONE ENCOUNTER
Appears only other option is [place]. Is she willing to go to [place]? If so, may need to check with [facility].

## 2019-07-08 NOTE — TELEPHONE ENCOUNTER
I called to verify with her and she is requesting to see-     Dustin Yost MD at the [facility] (accepts [facility] and new patients)  [address], [place], [address]  Phone [phone]  Fax [phone].

## 2019-07-08 NOTE — TELEPHONE ENCOUNTER
This pt is requesting a new referral to a new oncologist. She is not very content with with Cancer care specialists.  Please advise.

## 2019-07-15 ENCOUNTER — PATIENT OUTREACH (OUTPATIENT)
Dept: HEALTH INFORMATION MANAGEMENT | Facility: OTHER | Age: 74
End: 2019-07-15

## 2019-07-17 ENCOUNTER — TELEPHONE (OUTPATIENT)
Dept: MEDICAL GROUP | Age: 74
End: 2019-07-17

## 2019-07-17 NOTE — TELEPHONE ENCOUNTER
ESTABLISHED PATIENT PRE-VISIT PLANNING     Patient was NOT contacted to complete PVP.     Note: Patient will not be contacted if there is no indication to call.     1.  Reviewed notes from the last few office visits within the medical group: Yes    2.  If any orders were placed at last visit or intended to be done for this visit (i.e. 6 mos follow-up), do we have Results/Consult Notes?        •  Labs - Labs were not ordered at last office visit.   Note: If patient appointment is for lab review and patient did not complete labs, check with provider if OK to reschedule patient until labs completed.       •  Imaging - Imaging was not ordered at last office visit.       •  Referrals - Referral ordered, patient has NOT been seen.    3. Is this appointment scheduled as a Hospital Follow-Up? No    4.  Immunizations were updated in Epic using WebIZ?: Epic matches WebIZ       •  Web Iz Recommendations: SHINGRIX (Shingles)    5.  Patient is due for the following Health Maintenance Topics:   Health Maintenance Due   Topic Date Due   • IMM ZOSTER VACCINES (2 of 3) 12/30/2011       - Patient is up-to-date on all Health Maintenance topics. No records have been requested at this time.    6. Orders for overdue Health Maintenance topics pended in Pre-Charting? N\A    7.  AHA (MDX) form printed for Provider? No, already completed    8.  Patient was NOT informed to arrive 15 min prior to their scheduled appointment and bring in their medication bottles.

## 2019-07-18 ENCOUNTER — OFFICE VISIT (OUTPATIENT)
Dept: MEDICAL GROUP | Age: 74
End: 2019-07-18
Payer: MEDICARE

## 2019-07-18 VITALS
DIASTOLIC BLOOD PRESSURE: 66 MMHG | HEIGHT: 62 IN | OXYGEN SATURATION: 97 % | SYSTOLIC BLOOD PRESSURE: 122 MMHG | BODY MASS INDEX: 27.79 KG/M2 | HEART RATE: 66 BPM | WEIGHT: 151 LBS | TEMPERATURE: 98.1 F

## 2019-07-18 DIAGNOSIS — F41.1 GAD (GENERALIZED ANXIETY DISORDER): ICD-10-CM

## 2019-07-18 DIAGNOSIS — I10 ESSENTIAL HYPERTENSION: ICD-10-CM

## 2019-07-18 DIAGNOSIS — E78.5 DYSLIPIDEMIA: ICD-10-CM

## 2019-07-18 DIAGNOSIS — C50.919 METASTATIC BREAST CARCINOMA: Primary | ICD-10-CM

## 2019-07-18 PROCEDURE — 99214 OFFICE O/P EST MOD 30 MIN: CPT | Performed by: FAMILY MEDICINE

## 2019-07-18 RX ORDER — ALPRAZOLAM 0.25 MG/1
0.25 TABLET ORAL NIGHTLY PRN
Qty: 30 TAB | Refills: 0 | Status: SHIPPED | OUTPATIENT
Start: 2019-07-18 | End: 2019-08-17

## 2019-07-18 RX ORDER — ONDANSETRON HYDROCHLORIDE 8 MG/1
TABLET, FILM COATED ORAL
COMMUNITY
Start: 2019-07-08 | End: 2020-01-01

## 2019-07-18 RX ORDER — CAPECITABINE 500 MG/1
TABLET, FILM COATED ORAL
Refills: 0 | COMMUNITY
Start: 2019-07-17 | End: 2019-07-31

## 2019-07-18 NOTE — PROGRESS NOTES
Subjective:   CC: Metastatic breast cancer follow-up    HPI:     Katie Monroy is a 74 y.o. female who is an established patient of the clinic, presents with the following concerns:     In June the patient was diagnosed with metastatic adenocarcinoma of the breast.  She was referred to cancer care specialist.  However, she is disappointed with the care provided.  She has now switched to Dr. Yost of [facility].  She is planning to start chemotherapy in the future.  She requests orders for echocardiogram and CA27.29, CEA prior to initiation of chemotherapy per Dr. Yost's request.  Patient continue to do well.  She receives excellent support from her  and family.    Patient has history of PEDRO.  Citalopram was recommended at previous office visit due to worsening of depression and anxiety after her diagnosis of metastatic breast cancer.  However, she did not start her citalopram. She has alprazolam at home that she takes as needed. She is requesting a refill of her alprazolam.    She has history of hyperlipidemia that is controlled through diet.  Her last lipid profile was done in February 2019, which showed mildly elevated serum LDL, otherwise unremarkable.  She also has history of essential hypertension that is under good control with amlodipine 5 mg daily.  Patient tolerates all medication well, no side effect reported.    Current medicines (including changes today)  Current Outpatient Prescriptions   Medication Sig Dispense Refill   • capecitabine (XELODA) 500 MG tablet TAKE 3 TABLETS (1500MG) BY MOUTH TWICE DAILY FOR 14 DAYS,THEN 7 DAYS OFF.  0   • ondansetron (ZOFRAN) 8 MG Tab      • B Complex Vitamins (VITAMIN B COMPLEX PO) Take  by mouth.     • ALPRAZolam (XANAX) 0.25 MG Tab Take 1 Tab by mouth at bedtime as needed for Anxiety for up to 30 days. 30 Tab 0   • amLODIPine (NORVASC) 5 MG Tab TAKE ONE TABLET BY MOUTH EVERY  Tab 1   • Multiple Vitamins-Minerals (ONE-A-DAY 50 PLUS  "PO) Take  by mouth.     • Coenzyme Q10 (COQ-10 PO) Take 1 Tab by mouth every day.     • cyanocobalamin (VITAMIN B-12) 500 MCG Tab Take 500 mcg by mouth every day.     • vitamin D (CHOLECALCIFEROL) 1000 UNIT Tab Take 1,000 Units by mouth every day.       No current facility-administered medications for this visit.      She  has a past medical history of Arthritis; Asthma; Breast cancer (HCC); Bunion; Cancer (HCC); Cancer (HCC) (2017); Hypertension (2017); Irritable bowel syndrome; Plantar fasciitis of right foot; and Prediabetes.    I personally reviewed patient's problem list, allergies, medications, family hx, social hx with patient and update EPIC.     REVIEW OF SYSTEMS:  CONSTITUTIONAL:  Denies night sweats, fatigue, malaise, lethargy, fever or chills.  RESPIRATORY:  Denies cough, wheeze, hemoptysis, or shortness of breath.  CARDIOVASCULAR:  Denies chest pains, palpitations, pedal edema     Objective:     /66 (BP Location: Right arm, Patient Position: Sitting, BP Cuff Size: Adult)   Pulse 66   Temp 36.7 °C (98.1 °F) (Temporal)   Ht 1.575 m (5' 2\")   Wt 68.5 kg (151 lb)   SpO2 97%  Body mass index is 27.62 kg/m².    Physical Exam:  Constitutional: awake, alert, in no distress.  Skin: Warm, dry, good turgor, no rashes, bruises, ulcers in visible areas.  Eye: conjunctiva clear, lids neg for edema or lesions.  Neck: Trachea midline, no masses, no thyromegaly. No cervical or supraclavicular lymphadenopathy  Respiratory: Unlabored respiratory effort, lungs clear to auscultation, no wheezes, no rales.  Cardiovascular: Normal S1, S2, no murmur, no pedal edema.   Psych: Oriented x3, affect and mood wnl, intact judgement and insight.       Assessment and Plan:   The following treatment plan was discussed    1. 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  Patient was initially followed by cancer care specialist.  However, " patient is not working with [facility] oncology, Dr. Yost.  Patient is pleased with the level of care provided by [facility].  Patient is planning to start chemotherapy in the near future.  Dr. Yost requests order for echocardiogram, CEA, CA 27.29 blood tests.  Patient continue to cope well with the diagnosis.  She receives excellent support from her  and family.  - CEA; Future  - CA 27.29; Future  - EC-ECHOCARDIOGRAM COMPLETE W/O CONT; Future  - Follow-up with Dr. Yost as directed    2. Essential hypertension  Chronic, controlled with amlodipine 5 mg daily, her blood pressure was 122/66 today.  -Continue amlodipine 5 mg daily  - Pt was counseled on dietary modification, weight loss, smoking cessation, and avoidance of excessive alcohol consumption.    - Recommended moderate intensity exercise at least 30 minutes per day x 5 days per week.     3. Dyslipidemia- mild no meds  Chronic, diet controlled, will monitor    4. PEDRO (generalized anxiety disorder)  Chronic, declined SSRI, she want to continue to take Xanax as needed.  Patient denies history of drugs or alcohol abuse.  Historically, she tolerates Xanax well, no side effect reported.  - ALPRAZolam (XANAX) 0.25 MG Tab; Take 1 Tab by mouth at bedtime as needed for Anxiety for up to 30 days.  Dispense: 30 Tab; Refill: 0  - Risks, benefits, side effects, as well as potential health complications associated with Xanax discussed with patient. Appropriate counseling provided.        Hailey Bradshaw M.D.    Followup: Return for As needed.    Please note that this dictation was created using voice recognition software and/or scribes. I have made every reasonable attempt to correct obvious errors, but I expect that there are errors of grammar and possibly content that I did not discover before finalizing the note.     I, Vern Camacho (Scribe), am scribing for, and in the presence of, Hailey Bradshaw M.D.    Electronically signed by: Vern Camacho  (Scribe), 7/18/2019    Hailey OBREGON M.D. personally performed the services described in this documentation, as scribed by Vern Camacho in my presence, and it is both accurate and complete.

## 2019-07-19 ENCOUNTER — HOSPITAL ENCOUNTER (OUTPATIENT)
Dept: LAB | Facility: MEDICAL CENTER | Age: 74
End: 2019-07-19
Attending: FAMILY MEDICINE
Payer: MEDICARE

## 2019-07-19 DIAGNOSIS — C50.919 METASTATIC BREAST CARCINOMA: ICD-10-CM

## 2019-07-19 LAB — CEA SERPL-MCNC: 2 NG/ML (ref 0–3)

## 2019-07-19 PROCEDURE — 82378 CARCINOEMBRYONIC ANTIGEN: CPT

## 2019-07-19 PROCEDURE — 36415 COLL VENOUS BLD VENIPUNCTURE: CPT

## 2019-07-19 PROCEDURE — 86300 IMMUNOASSAY TUMOR CA 15-3: CPT

## 2019-07-21 LAB — CANCER AG27-29 SERPL-ACNC: 49.5 U/ML (ref 0–40)

## 2019-07-22 NOTE — PROGRESS NOTES
A 74-year-old female was an elective admission to [facility] from 6/11/2019 to 6/12/2019 to treat pneumothorax which she had developed after a lung biopsy. [facility] visited the patient bedside. The patient was discharged home. The patient's medical condition included: Cancer. The patient was not under clinical case management.     The Patient was discharged with no medications.       Per discharge orders, patient was instructed to see her PCP within a week. Patient saw her PCP on 6/19/19. In addition, patient saw her oncologist Dr. Rhodes on 6/18/19. On 7/22/19, patient is scheduled to establish with a new oncologist at [facility].       [facility] patient advocate was able to successfully engage with patient post-discharge. However, when advocate followed up with patient again, she requested no more out-reaches, so advocate monitored patient on the backend to confirm appointments and medication compliance.

## 2019-07-29 ENCOUNTER — HOSPITAL ENCOUNTER (OUTPATIENT)
Dept: CARDIOLOGY | Facility: MEDICAL CENTER | Age: 74
End: 2019-07-29
Attending: FAMILY MEDICINE
Payer: MEDICARE

## 2019-07-29 DIAGNOSIS — C50.919 METASTATIC BREAST CARCINOMA: ICD-10-CM

## 2019-07-29 LAB
LV EJECT FRACT  99904: 70
LV EJECT FRACT MOD 2C 99903: 72
LV EJECT FRACT MOD 4C 99902: 75.26
LV EJECT FRACT MOD BP 99901: 72.82

## 2019-07-29 PROCEDURE — 93306 TTE W/DOPPLER COMPLETE: CPT | Mod: 26 | Performed by: INTERNAL MEDICINE

## 2019-07-29 PROCEDURE — 93306 TTE W/DOPPLER COMPLETE: CPT

## 2019-07-31 ENCOUNTER — OFFICE VISIT (OUTPATIENT)
Dept: MEDICAL GROUP | Age: 74
End: 2019-07-31
Payer: MEDICARE

## 2019-07-31 VITALS
HEIGHT: 62 IN | WEIGHT: 151.01 LBS | HEART RATE: 64 BPM | TEMPERATURE: 98.1 F | OXYGEN SATURATION: 94 % | BODY MASS INDEX: 27.79 KG/M2 | SYSTOLIC BLOOD PRESSURE: 102 MMHG | DIASTOLIC BLOOD PRESSURE: 58 MMHG

## 2019-07-31 DIAGNOSIS — G89.18 PAIN AT SURGICAL SITE: ICD-10-CM

## 2019-07-31 DIAGNOSIS — B00.1 COLD SORE: ICD-10-CM

## 2019-07-31 DIAGNOSIS — C50.919 METASTATIC BREAST CARCINOMA: ICD-10-CM

## 2019-07-31 PROCEDURE — 99214 OFFICE O/P EST MOD 30 MIN: CPT | Performed by: INTERNAL MEDICINE

## 2019-07-31 RX ORDER — LIDOCAINE 50 MG/G
OINTMENT TOPICAL
Qty: 1 TUBE | Refills: 1 | Status: SHIPPED
Start: 2019-07-31 | End: 2019-12-27

## 2019-07-31 RX ORDER — ACYCLOVIR 400 MG/1
400 TABLET ORAL 3 TIMES DAILY
Qty: 30 TAB | Refills: 3 | Status: ON HOLD | OUTPATIENT
Start: 2019-07-31 | End: 2019-08-11

## 2019-07-31 NOTE — PROGRESS NOTES
Subjective:   Katie Monroy is a 74 y.o. female here today for evaluation and management of:  This is an established patient of Dr. Bradshaw.     Metastatic breast carcinoma_[facility]  Pain at surgical site  The patient has a PMHx of metastatic breast cancer, who underwent 1st round of chemotherapy last week. She had a chemo port placed last week. Patient is followed by [facility]. Patient is requesting a topical lidocaine to apply around port for pain control.     Cold sore  Patient developed a mouth sore to her lower lip one week ago. She complains of mild pain associated with the sore. Patient states her oncology office recommended she see her PCP for evaluation and possible treatment with antiviral medications.   Patient denies any pain with swallowing or sore throat.       Current medicines (including changes today)  Current Outpatient Medications   Medication Sig Dispense Refill   • acyclovir (ZOVIRAX) 400 MG tablet Take 1 Tab by mouth 3 times a day. 30 Tab 3   • lidocaine (XYLOCAINE) 5 % Ointment Apply thin layer on affected skin twice a day as needed 1 Tube 1   • ondansetron (ZOFRAN) 8 MG Tab      • B Complex Vitamins (VITAMIN B COMPLEX PO) Take  by mouth.     • ALPRAZolam (XANAX) 0.25 MG Tab Take 1 Tab by mouth at bedtime as needed for Anxiety for up to 30 days. 30 Tab 0   • amLODIPine (NORVASC) 5 MG Tab TAKE ONE TABLET BY MOUTH EVERY  Tab 1   • Multiple Vitamins-Minerals (ONE-A-DAY 50 PLUS PO) Take  by mouth.     • Coenzyme Q10 (COQ-10 PO) Take 1 Tab by mouth every day.     • cyanocobalamin (VITAMIN B-12) 500 MCG Tab Take 500 mcg by mouth every day.     • vitamin D (CHOLECALCIFEROL) 1000 UNIT Tab Take 1,000 Units by mouth every day.       No current facility-administered medications for this visit.      She  has a past medical history of Arthritis, Asthma, Breast cancer (HCC), Bunion, Cancer (HCC), Cancer (HCC) (2017), Hypertension (2017), Irritable bowel syndrome,  "Plantar fasciitis of right foot, and Prediabetes.    ROS   No chest pain, no shortness of breath, no abdominal pain     Objective:     /58 (BP Location: Left arm, Patient Position: Sitting, BP Cuff Size: Adult)   Pulse 64   Temp 36.7 °C (98.1 °F) (Temporal)   Ht 1.575 m (5' 2.01\")   Wt 68.5 kg (151 lb 0.2 oz)   SpO2 94%  Body mass index is 27.61 kg/m².   Physical Exam:  General: Alert, oriented and no acute distress.  Eye contact is good, speech goal directed, affect calm  HEENT: conjunctiva non-injected, sclera non-icteric.  Oral mucous membranes pink and moist with one erythematous blister on lower lip.  Pinna normal.   Lungs: Normal respiratory effort, clear to auscultation bilaterally with good excursion.  CV: regular rate and rhythm. No murmurs.  Abdomen: soft, non distended, nontender, Bowel sound normal.  Ext: no edema, color normal, vascularity normal, temperature normal    Assessment and Plan:   The following treatment plan was discussed     1. Cold sore  - Acute problem. Patient is immunocompromised secondary as she recently underwent 1st round of chemotherapy. Patient was prescribed 10 day regimen of Acyclovir 400 mg to be taken three times daily as directed. Patient was provided with refills for Acyclovir to restart above regimen as needed for recurrence of symptoms.   - Reviewed the risks and benefits as well as potential side effects of medications with patient.  - acyclovir (ZOVIRAX) 400 MG tablet; Take 1 Tab by mouth 3 times a day.  Dispense: 30 Tab; Refill: 3    2. Metastatic breast carcinoma_[facility]  - Patient was prescribed Xylocaine 5% ointment for treatment of pain at chemo port site as needed during chemotherapy infusions. Continue to follow up with your Oncologist, Dr. Yost.   - lidocaine (XYLOCAINE) 5 % Ointment; Apply thin layer on affected skin twice a day as needed  Dispense: 1 Tube; Refill: 1    3. Pain at surgical site  - Patient was prescribed Xylocaine 5% " ointment for treatment of pain at chemo port site as needed during chemotherapy infusions. Continue to follow up with your Oncologist, Dr. Yost.   - lidocaine (XYLOCAINE) 5 % Ointment; Apply thin layer on affected skin twice a day as needed  Dispense: 1 Tube; Refill: 1    Patient declined to set up follow-up appointment with PCP, Dr. Bradshaw today.  She states that she will call to schedule appointment as needed.    Followup: Return if symptoms worsen or fail to improve.      Please note that this dictation was created using voice recognition software. I have made every reasonable attempt to correct obvious errors, but I expect that there may have unintended errors in text, spelling, punctuation, or grammar that I did not discover.    I, Baldomero Jarquin (Scribe), am scribing for, and in the presence of, Candi Schofield M.D..    Electronically signed by: Baldomero Jarquin (Eligioe), 7/31/2019    I, Candi Schofield M.D., personally performed the services described in this documentation, as scribed by Baldomero Jarquin in my presence, and it is both accurate and complete.

## 2019-08-07 ENCOUNTER — HOSPITAL ENCOUNTER (INPATIENT)
Facility: MEDICAL CENTER | Age: 74
LOS: 4 days | DRG: 872 | End: 2019-08-11
Attending: EMERGENCY MEDICINE | Admitting: HOSPITALIST
Payer: MEDICARE

## 2019-08-07 ENCOUNTER — APPOINTMENT (OUTPATIENT)
Dept: RADIOLOGY | Facility: MEDICAL CENTER | Age: 74
DRG: 872 | End: 2019-08-07
Attending: EMERGENCY MEDICINE
Payer: MEDICARE

## 2019-08-07 DIAGNOSIS — R50.81 NEUTROPENIC FEVER (HCC): ICD-10-CM

## 2019-08-07 DIAGNOSIS — D70.9 NEUTROPENIC FEVER (HCC): ICD-10-CM

## 2019-08-07 PROBLEM — A41.9 SEPSIS (HCC): Status: ACTIVE | Noted: 2019-08-07

## 2019-08-07 PROBLEM — K12.0 APHTHOUS ULCER OF MOUTH: Status: ACTIVE | Noted: 2019-08-07

## 2019-08-07 LAB
ALBUMIN SERPL BCP-MCNC: 3.5 G/DL (ref 3.2–4.9)
ALBUMIN/GLOB SERPL: 1.1 G/DL
ALP SERPL-CCNC: 65 U/L (ref 30–99)
ALT SERPL-CCNC: 20 U/L (ref 2–50)
ANION GAP SERPL CALC-SCNC: 7 MMOL/L (ref 0–11.9)
APPEARANCE UR: CLEAR
AST SERPL-CCNC: 21 U/L (ref 12–45)
BASOPHILS # BLD AUTO: 0 % (ref 0–1.8)
BASOPHILS # BLD: 0 K/UL (ref 0–0.12)
BILIRUB SERPL-MCNC: 1.1 MG/DL (ref 0.1–1.5)
BILIRUB UR QL STRIP.AUTO: NEGATIVE
BUN SERPL-MCNC: 8 MG/DL (ref 8–22)
CALCIUM SERPL-MCNC: 8.6 MG/DL (ref 8.4–10.2)
CHLORIDE SERPL-SCNC: 100 MMOL/L (ref 96–112)
CO2 SERPL-SCNC: 23 MMOL/L (ref 20–33)
COLOR UR: YELLOW
CREAT SERPL-MCNC: 0.72 MG/DL (ref 0.5–1.4)
EOSINOPHIL # BLD AUTO: 0 K/UL (ref 0–0.51)
EOSINOPHIL NFR BLD: 0 % (ref 0–6.9)
ERYTHROCYTE [DISTWIDTH] IN BLOOD BY AUTOMATED COUNT: 38.1 FL (ref 35.9–50)
GLOBULIN SER CALC-MCNC: 3.2 G/DL (ref 1.9–3.5)
GLUCOSE SERPL-MCNC: 125 MG/DL (ref 65–99)
GLUCOSE UR STRIP.AUTO-MCNC: NEGATIVE MG/DL
HCT VFR BLD AUTO: 40.6 % (ref 37–47)
HGB BLD-MCNC: 13.8 G/DL (ref 12–16)
KETONES UR STRIP.AUTO-MCNC: NEGATIVE MG/DL
LACTATE BLD-SCNC: 1.3 MMOL/L (ref 0.5–2)
LACTATE BLD-SCNC: 1.4 MMOL/L (ref 0.5–2)
LEUKOCYTE ESTERASE UR QL STRIP.AUTO: NEGATIVE
LG PLATELETS BLD QL SMEAR: NORMAL
LYMPHOCYTES # BLD AUTO: 0.58 K/UL (ref 1–4.8)
LYMPHOCYTES NFR BLD: 73 % (ref 22–41)
MANUAL DIFF BLD: NORMAL
MCH RBC QN AUTO: 29.3 PG (ref 27–33)
MCHC RBC AUTO-ENTMCNC: 34 G/DL (ref 33.6–35)
MCV RBC AUTO: 86.2 FL (ref 81.4–97.8)
MICRO URNS: NORMAL
MONOCYTES # BLD AUTO: 0.19 K/UL (ref 0–0.85)
MONOCYTES NFR BLD AUTO: 24 % (ref 0–13.4)
MYELOCYTES NFR BLD MANUAL: 1 %
NEUTROPHILS # BLD AUTO: 0.02 K/UL (ref 2–7.15)
NEUTROPHILS NFR BLD: 2 % (ref 44–72)
NITRITE UR QL STRIP.AUTO: NEGATIVE
NRBC # BLD AUTO: 0 K/UL
NRBC BLD-RTO: 0 /100 WBC
PH UR STRIP.AUTO: 7.5 [PH] (ref 5–8)
PLATELET # BLD AUTO: 223 K/UL (ref 164–446)
PLATELET BLD QL SMEAR: NORMAL
PMV BLD AUTO: 9.6 FL (ref 9–12.9)
POTASSIUM SERPL-SCNC: 3.4 MMOL/L (ref 3.6–5.5)
PROT SERPL-MCNC: 6.7 G/DL (ref 6–8.2)
PROT UR QL STRIP: NEGATIVE MG/DL
RBC # BLD AUTO: 4.71 M/UL (ref 4.2–5.4)
RBC BLD AUTO: NORMAL
RBC UR QL AUTO: NEGATIVE
SODIUM SERPL-SCNC: 130 MMOL/L (ref 135–145)
SP GR UR STRIP.AUTO: 1.01
VARIANT LYMPHS BLD QL SMEAR: NORMAL
WBC # BLD AUTO: 0.8 K/UL (ref 4.8–10.8)

## 2019-08-07 PROCEDURE — 85027 COMPLETE CBC AUTOMATED: CPT

## 2019-08-07 PROCEDURE — 80053 COMPREHEN METABOLIC PANEL: CPT

## 2019-08-07 PROCEDURE — 700111 HCHG RX REV CODE 636 W/ 250 OVERRIDE (IP): Performed by: HOSPITALIST

## 2019-08-07 PROCEDURE — 83605 ASSAY OF LACTIC ACID: CPT

## 2019-08-07 PROCEDURE — 700105 HCHG RX REV CODE 258: Performed by: EMERGENCY MEDICINE

## 2019-08-07 PROCEDURE — 700102 HCHG RX REV CODE 250 W/ 637 OVERRIDE(OP): Performed by: EMERGENCY MEDICINE

## 2019-08-07 PROCEDURE — 36415 COLL VENOUS BLD VENIPUNCTURE: CPT

## 2019-08-07 PROCEDURE — 700105 HCHG RX REV CODE 258: Performed by: HOSPITALIST

## 2019-08-07 PROCEDURE — 96365 THER/PROPH/DIAG IV INF INIT: CPT

## 2019-08-07 PROCEDURE — 770001 HCHG ROOM/CARE - MED/SURG/GYN PRIV*

## 2019-08-07 PROCEDURE — 85007 BL SMEAR W/DIFF WBC COUNT: CPT

## 2019-08-07 PROCEDURE — 87086 URINE CULTURE/COLONY COUNT: CPT

## 2019-08-07 PROCEDURE — 96366 THER/PROPH/DIAG IV INF ADDON: CPT

## 2019-08-07 PROCEDURE — 700102 HCHG RX REV CODE 250 W/ 637 OVERRIDE(OP): Performed by: HOSPITALIST

## 2019-08-07 PROCEDURE — 81003 URINALYSIS AUTO W/O SCOPE: CPT

## 2019-08-07 PROCEDURE — A9270 NON-COVERED ITEM OR SERVICE: HCPCS | Performed by: EMERGENCY MEDICINE

## 2019-08-07 PROCEDURE — 96367 TX/PROPH/DG ADDL SEQ IV INF: CPT

## 2019-08-07 PROCEDURE — 71045 X-RAY EXAM CHEST 1 VIEW: CPT

## 2019-08-07 PROCEDURE — 700101 HCHG RX REV CODE 250: Performed by: EMERGENCY MEDICINE

## 2019-08-07 PROCEDURE — 99223 1ST HOSP IP/OBS HIGH 75: CPT | Mod: AI | Performed by: HOSPITALIST

## 2019-08-07 PROCEDURE — 700111 HCHG RX REV CODE 636 W/ 250 OVERRIDE (IP): Performed by: EMERGENCY MEDICINE

## 2019-08-07 PROCEDURE — A9270 NON-COVERED ITEM OR SERVICE: HCPCS | Performed by: HOSPITALIST

## 2019-08-07 PROCEDURE — 87040 BLOOD CULTURE FOR BACTERIA: CPT | Mod: 91

## 2019-08-07 PROCEDURE — 99285 EMERGENCY DEPT VISIT HI MDM: CPT

## 2019-08-07 RX ORDER — SODIUM CHLORIDE 9 MG/ML
INJECTION, SOLUTION INTRAVENOUS CONTINUOUS
Status: DISCONTINUED | OUTPATIENT
Start: 2019-08-07 | End: 2019-08-09

## 2019-08-07 RX ORDER — ONDANSETRON 4 MG/1
4 TABLET, ORALLY DISINTEGRATING ORAL EVERY 4 HOURS PRN
Status: DISCONTINUED | OUTPATIENT
Start: 2019-08-07 | End: 2019-08-11 | Stop reason: HOSPADM

## 2019-08-07 RX ORDER — BISACODYL 10 MG
10 SUPPOSITORY, RECTAL RECTAL
Status: DISCONTINUED | OUTPATIENT
Start: 2019-08-07 | End: 2019-08-10

## 2019-08-07 RX ORDER — LIDOCAINE AND PRILOCAINE 25; 25 MG/G; MG/G
CREAM TOPICAL ONCE
Status: COMPLETED | OUTPATIENT
Start: 2019-08-07 | End: 2019-08-07

## 2019-08-07 RX ORDER — POLYETHYLENE GLYCOL 3350 17 G/17G
1 POWDER, FOR SOLUTION ORAL
Status: DISCONTINUED | OUTPATIENT
Start: 2019-08-07 | End: 2019-08-10

## 2019-08-07 RX ORDER — DIPHENHYDRAMINE HYDROCHLORIDE AND LIDOCAINE HYDROCHLORIDE AND ALUMINUM HYDROXIDE AND MAGNESIUM HYDRO
5 KIT EVERY 6 HOURS PRN
Status: DISCONTINUED | OUTPATIENT
Start: 2019-08-07 | End: 2019-08-07

## 2019-08-07 RX ORDER — ALPRAZOLAM 0.25 MG/1
0.25 TABLET ORAL NIGHTLY PRN
Status: DISCONTINUED | OUTPATIENT
Start: 2019-08-07 | End: 2019-08-11 | Stop reason: HOSPADM

## 2019-08-07 RX ORDER — SODIUM CHLORIDE 9 MG/ML
30 INJECTION, SOLUTION INTRAVENOUS
Status: DISCONTINUED | OUTPATIENT
Start: 2019-08-07 | End: 2019-08-11

## 2019-08-07 RX ORDER — ACETAMINOPHEN 500 MG
TABLET ORAL
Status: COMPLETED
Start: 2019-08-07 | End: 2019-08-07

## 2019-08-07 RX ORDER — ONDANSETRON 2 MG/ML
4 INJECTION INTRAMUSCULAR; INTRAVENOUS EVERY 4 HOURS PRN
Status: DISCONTINUED | OUTPATIENT
Start: 2019-08-07 | End: 2019-08-11 | Stop reason: HOSPADM

## 2019-08-07 RX ORDER — SODIUM CHLORIDE 9 MG/ML
1000 INJECTION, SOLUTION INTRAVENOUS
Status: DISCONTINUED | OUTPATIENT
Start: 2019-08-07 | End: 2019-08-11

## 2019-08-07 RX ORDER — DIPHENHYDRAMINE HYDROCHLORIDE AND LIDOCAINE HYDROCHLORIDE AND ALUMINUM HYDROXIDE AND MAGNESIUM HYDRO
5 KIT EVERY 6 HOURS PRN
Status: DISCONTINUED | OUTPATIENT
Start: 2019-08-07 | End: 2019-08-11 | Stop reason: HOSPADM

## 2019-08-07 RX ORDER — AMLODIPINE BESYLATE 5 MG/1
5 TABLET ORAL
Status: DISCONTINUED | OUTPATIENT
Start: 2019-08-08 | End: 2019-08-11 | Stop reason: HOSPADM

## 2019-08-07 RX ORDER — AMOXICILLIN 250 MG
2 CAPSULE ORAL 2 TIMES DAILY
Status: DISCONTINUED | OUTPATIENT
Start: 2019-08-07 | End: 2019-08-10

## 2019-08-07 RX ORDER — IBUPROFEN 600 MG/1
600 TABLET ORAL ONCE
Status: COMPLETED | OUTPATIENT
Start: 2019-08-07 | End: 2019-08-07

## 2019-08-07 RX ORDER — ACETAMINOPHEN 500 MG
1000 TABLET ORAL ONCE
Status: COMPLETED | OUTPATIENT
Start: 2019-08-07 | End: 2019-08-07

## 2019-08-07 RX ORDER — ACYCLOVIR 200 MG/1
400 CAPSULE ORAL 3 TIMES DAILY
Status: DISCONTINUED | OUTPATIENT
Start: 2019-08-07 | End: 2019-08-11 | Stop reason: HOSPADM

## 2019-08-07 RX ADMIN — IBUPROFEN 600 MG: 600 TABLET ORAL at 16:17

## 2019-08-07 RX ADMIN — ACETAMINOPHEN 1000 MG: 500 TABLET, FILM COATED ORAL at 23:09

## 2019-08-07 RX ADMIN — VANCOMYCIN HYDROCHLORIDE 1750 MG: 500 INJECTION, POWDER, LYOPHILIZED, FOR SOLUTION INTRAVENOUS at 17:22

## 2019-08-07 RX ADMIN — ACYCLOVIR 400 MG: 200 CAPSULE ORAL at 20:44

## 2019-08-07 RX ADMIN — LIDOCAINE AND PRILOCAINE 5 G: 25; 25 CREAM TOPICAL at 15:48

## 2019-08-07 RX ADMIN — SODIUM CHLORIDE: 9 INJECTION, SOLUTION INTRAVENOUS at 18:30

## 2019-08-07 RX ADMIN — CEFTRIAXONE SODIUM 2 G: 2 INJECTION, POWDER, FOR SOLUTION INTRAMUSCULAR; INTRAVENOUS at 16:40

## 2019-08-07 RX ADMIN — CEFEPIME 2 G: 2 INJECTION, POWDER, FOR SOLUTION INTRAVENOUS at 20:43

## 2019-08-07 ASSESSMENT — COGNITIVE AND FUNCTIONAL STATUS - GENERAL
DRESSING REGULAR LOWER BODY CLOTHING: A LITTLE
SUGGESTED CMS G CODE MODIFIER MOBILITY: CK
TOILETING: A LITTLE
MOBILITY SCORE: 19
SUGGESTED CMS G CODE MODIFIER DAILY ACTIVITY: CJ
DRESSING REGULAR UPPER BODY CLOTHING: A LITTLE
DAILY ACTIVITIY SCORE: 20
MOVING FROM LYING ON BACK TO SITTING ON SIDE OF FLAT BED: A LITTLE
WALKING IN HOSPITAL ROOM: A LITTLE
STANDING UP FROM CHAIR USING ARMS: A LITTLE
CLIMB 3 TO 5 STEPS WITH RAILING: A LITTLE
MOVING TO AND FROM BED TO CHAIR: A LITTLE
HELP NEEDED FOR BATHING: A LITTLE

## 2019-08-07 ASSESSMENT — LIFESTYLE VARIABLES
CONSUMPTION TOTAL: NEGATIVE
AVERAGE NUMBER OF DAYS PER WEEK YOU HAVE A DRINK CONTAINING ALCOHOL: 0
TOTAL SCORE: 0
ON A TYPICAL DAY WHEN YOU DRINK ALCOHOL HOW MANY DRINKS DO YOU HAVE: 0
ALCOHOL_USE: NO
EVER FELT BAD OR GUILTY ABOUT YOUR DRINKING: NO
HOW MANY TIMES IN THE PAST YEAR HAVE YOU HAD 5 OR MORE DRINKS IN A DAY: 0
HAVE PEOPLE ANNOYED YOU BY CRITICIZING YOUR DRINKING: NO
HAVE YOU EVER FELT YOU SHOULD CUT DOWN ON YOUR DRINKING: NO
TOTAL SCORE: 0
TOTAL SCORE: 0
EVER HAD A DRINK FIRST THING IN THE MORNING TO STEADY YOUR NERVES TO GET RID OF A HANGOVER: NO

## 2019-08-07 ASSESSMENT — ENCOUNTER SYMPTOMS
LOSS OF CONSCIOUSNESS: 0
PSYCHIATRIC NEGATIVE: 1
BACK PAIN: 0
VOMITING: 0
NERVOUS/ANXIOUS: 0
CARDIOVASCULAR NEGATIVE: 1
SORE THROAT: 1
WEAKNESS: 1
CHILLS: 1
COUGH: 0
DEPRESSION: 0
MUSCULOSKELETAL NEGATIVE: 1
MYALGIAS: 0
DIZZINESS: 0
SHORTNESS OF BREATH: 0
RESPIRATORY NEGATIVE: 1
FEVER: 1
ABDOMINAL PAIN: 0
WEIGHT LOSS: 0
BRUISES/BLEEDS EASILY: 0
FLANK PAIN: 0
NAUSEA: 0
GASTROINTESTINAL NEGATIVE: 1

## 2019-08-07 ASSESSMENT — PATIENT HEALTH QUESTIONNAIRE - PHQ9
2. FEELING DOWN, DEPRESSED, IRRITABLE, OR HOPELESS: NOT AT ALL
SUM OF ALL RESPONSES TO PHQ9 QUESTIONS 1 AND 2: 0
1. LITTLE INTEREST OR PLEASURE IN DOING THINGS: NOT AT ALL

## 2019-08-07 NOTE — ED PROVIDER NOTES
ED Provider Note    CHIEF COMPLAINT  Chief Complaint   Patient presents with   • Fever     Onset of a fever last night, she just started Chemo and had a port put in 7/30 for breast cancer. She took Ibuprofen 1200 today.   • Earache     Some sinus pressure       HPI  Katie Monroy is a 74 y.o. female who presents for evaluation of a fever, just started chemotherapy 1 week ago for metastatic breast cancer.  Her only other symptom is blistered lesions in her mouth and lips for which she saw her oncologist and was prescribed antiviral medication.  She is had no shortness of breath or coughing, no abdominal pain, no nausea or vomiting.  Denies any diarrhea.  States that she did have some cramping in her abdomen shortly after chemotherapy but none since.  No abnormal rashes otherwise.  No focal weakness numbness or tingling.  No other specific complaints.    REVIEW OF SYSTEMS  Negative for chest pain, dyspnea, abdominal pain, vomiting, diarrhea, headache, focal weakness, focal numbness, focal tingling, back pain. All other systems are negative.     PAST MEDICAL HISTORY  Past Medical History:   Diagnosis Date   • Arthritis     osteo   • Asthma     does not use inhalers   • Breast cancer (HCC)    • Bunion     both feet   • Cancer (HCC)     skin   • Cancer (HCC) 2017    breast   • Hypertension 2017    pt states well controlled on meds   • Irritable bowel syndrome    • Plantar fasciitis of right foot    • Prediabetes        FAMILY HISTORY  Family History   Problem Relation Age of Onset   • Cancer Mother         leukemia   • Heart Disease Mother         arrhythmia   • Cancer Father         lung   • Lung Disease Father    • No Known Problems Sister    • Diabetes Neg Hx        SOCIAL HISTORY  Social History     Tobacco Use   • Smoking status: Never Smoker   • Smokeless tobacco: Never Used   Substance Use Topics   • Alcohol use: Yes     Alcohol/week: 0.0 oz     Comment: occasional   • Drug use: No       SURGICAL HISTORY  Past  "Surgical History:   Procedure Laterality Date   • MASTECTOMY Left 3/17/2017    Procedure: MASTECTOMY - PARTIAL, WIRE LOCALIZED;  Surgeon: Katy Gastelum M.D.;  Location: SURGERY [facility];  Service:    • NODE BIOPSY SENTINEL Left 3/17/2017    Procedure: NODE BIOPSY SENTINEL;  Surgeon: Katy Gastelum M.D.;  Location: SURGERY [facility];  Service:    • OTHER      right ovary removed       CURRENT MEDICATIONS  I personally reviewed the medication list in the charting documentation.     ALLERGIES  Allergies   Allergen Reactions   • Tetanus-Diphth-Acell Pertussis      States that she had excessive swelling. She denies allergies to the new tetanus just the old one       MEDICAL RECORD  I have reviewed patient's medical record and pertinent results are listed above.      PHYSICAL EXAM  VITAL SIGNS: /57   Pulse (!) 102   Temp 38 °C (100.4 °F) (Oral)   Resp (!) 22   Ht 1.6 m (5' 3\")   Wt 68.5 kg (151 lb 0.2 oz)   SpO2 94%   BMI 26.75 kg/m²    Constitutional: Ill in appearance  HENT: Mucus membranes dry.  Vesicular lesion on the soft palate on the left, blister lesion on the lower lip.  Eyes: No scleral icterus. Normal conjunctiva   Neck: Supple, comfortable, nonpainful range of motion.   Cardiovascular: Tachycardic, regular  Thorax & Lungs: Port upper right chest.  Chest is nontender.  Lungs are clear to auscultation with good air movement bilaterally.  No wheeze, rhonchi, nor rales.   Abdomen: Soft, with no tenderness, rebound nor guarding.  No mass or pulsatile mass appreciated.  Skin: Warm, dry. No rash appreciated  Extremities/Musculoskeletal: No sign of trauma. No asymmetric calf tenderness, erythema or edema. Normal range of motion   Neurologic: Alert & oriented. No focal deficits observed.   Psychiatric: Normal affect appropriate for the clinical situation.    DIAGNOSTIC STUDIES / PROCEDURES    LABS/EKGs  Results for orders placed or performed during the hospital encounter of 08/07/19   CBC " WITH DIFFERENTIAL   Result Value Ref Range    WBC 0.8 (LL) 4.8 - 10.8 K/uL    RBC 4.71 4.20 - 5.40 M/uL    Hemoglobin 13.8 12.0 - 16.0 g/dL    Hematocrit 40.6 37.0 - 47.0 %    MCV 86.2 81.4 - 97.8 fL    MCH 29.3 27.0 - 33.0 pg    MCHC 34.0 33.6 - 35.0 g/dL    RDW 38.1 35.9 - 50.0 fL    Platelet Count 223 164 - 446 K/uL    MPV 9.6 9.0 - 12.9 fL    Neutrophils-Polys 2.00 (L) 44.00 - 72.00 %    Lymphocytes 73.00 (H) 22.00 - 41.00 %    Monocytes 24.00 (H) 0.00 - 13.40 %    Eosinophils 0.00 0.00 - 6.90 %    Basophils 0.00 0.00 - 1.80 %    Nucleated RBC 0.00 /100 WBC    Neutrophils (Absolute) 0.02 (LL) 2.00 - 7.15 K/uL    Lymphs (Absolute) 0.58 (L) 1.00 - 4.80 K/uL    Monos (Absolute) 0.19 0.00 - 0.85 K/uL    Eos (Absolute) 0.00 0.00 - 0.51 K/uL    Baso (Absolute) 0.00 0.00 - 0.12 K/uL    NRBC (Absolute) 0.00 K/uL   COMP METABOLIC PANEL   Result Value Ref Range    Sodium 130 (L) 135 - 145 mmol/L    Potassium 3.4 (L) 3.6 - 5.5 mmol/L    Chloride 100 96 - 112 mmol/L    Co2 23 20 - 33 mmol/L    Anion Gap 7.0 0.0 - 11.9    Glucose 125 (H) 65 - 99 mg/dL    Bun 8 8 - 22 mg/dL    Creatinine 0.72 0.50 - 1.40 mg/dL    Calcium 8.6 8.4 - 10.2 mg/dL    AST(SGOT) 21 12 - 45 U/L    ALT(SGPT) 20 2 - 50 U/L    Alkaline Phosphatase 65 30 - 99 U/L    Total Bilirubin 1.1 0.1 - 1.5 mg/dL    Albumin 3.5 3.2 - 4.9 g/dL    Total Protein 6.7 6.0 - 8.2 g/dL    Globulin 3.2 1.9 - 3.5 g/dL    A-G Ratio 1.1 g/dL   LACTIC ACID   Result Value Ref Range    Lactic Acid 1.4 0.5 - 2.0 mmol/L   LACTIC ACID   Result Value Ref Range    Lactic Acid 1.3 0.5 - 2.0 mmol/L   URINALYSIS   Result Value Ref Range    Color Yellow     Character Clear     Specific Gravity 1.010 <1.035    Ph 7.5 5.0 - 8.0    Glucose Negative Negative mg/dL    Ketones Negative Negative mg/dL    Protein Negative Negative mg/dL    Bilirubin Negative Negative    Nitrite Negative Negative    Leukocyte Esterase Negative Negative    Occult Blood Negative Negative    Micro Urine Req see below     ESTIMATED GFR   Result Value Ref Range    GFR If African American >60 >60 mL/min/1.73 m 2    GFR If Non African American >60 >60 mL/min/1.73 m 2   DIFFERENTIAL MANUAL   Result Value Ref Range    Myelocytes 1.00 %    Manual Diff Status PERFORMED    PLATELET ESTIMATE   Result Value Ref Range    Plt Estimation Normal    MORPHOLOGY   Result Value Ref Range    RBC Morphology Normal     Large Platelets 1+     Reactive Lymphocytes Few         RADIOLOGY  DX-CHEST-PORTABLE (1 VIEW)   Final Result      1.  No acute cardiopulmonary abnormality identified.      2.  Right upper lobe mass and left lower lobe pulmonary nodule      3.  Right internal jugular catheter appears appropriately located            COURSE & MEDICAL DECISION MAKING  I have reviewed any medical record information, laboratory studies and radiographic results as noted above.  Differential diagnoses includes: Sepsis, neutropenia, dehydration, electrolyte abnormalities, pneumonia, UTI, anemia    Encounter Summary: This is a 74 y.o. female with fever, received her first round of chemotherapy for metastatic breast cancer 1 week ago, she has some vesicular oral lesions otherwise no other acute complaints.  No obvious etiology of infection identified on exam.  She is febrile, tachycardic and tachypnea, very concerning for sepsis of broad-spectrum antibiotics in the form of ceftriaxone will be initiated.  She will receive antipyretics, IV fluids and reevaluated after blood work, chest x-ray and urinalysis ------ patient has neutropenia and leukocytopenia based on the blood work, there is no clear-cut source of infection but at this point I am concerned about her recently placed port.  She been treated with broad-spectrum antibiotics will be admitted to the hospital for further evaluation    HYDRATION: Based on the patient's presentation of Dehydration, Sepsis and Tachycardia the patient was given IV fluids. IV Hydration was used because oral hydration was not  adequate alone. Upon recheck following hydration, the patient was Improving.        DISPOSITION: Admit in guarded condition      FINAL IMPRESSION  1. Neutropenic fever (HCC)           This dictation was created using voice recognition software. The accuracy of the dictation is limited to the abilities of the software. I expect there may be some errors of grammar and possibly content. The nursing notes were reviewed and certain aspects of this information were incorporated into this note.    Electronically signed by: Randy Alamo, 8/7/2019 3:47 PM

## 2019-08-07 NOTE — ED TRIAGE NOTES
Chief Complaint   Patient presents with   • Fever     Onset of a fever last night, she just started Chemo and had a port put in 7/30 for breast cancer. She took Ibuprofen 1200 today.   • Earache     Some sinus pressure   Mask applied to pt.

## 2019-08-08 LAB — LACTATE BLD-SCNC: 0.9 MMOL/L (ref 0.5–2)

## 2019-08-08 PROCEDURE — A9270 NON-COVERED ITEM OR SERVICE: HCPCS | Performed by: HOSPITALIST

## 2019-08-08 PROCEDURE — 700102 HCHG RX REV CODE 250 W/ 637 OVERRIDE(OP): Performed by: HOSPITALIST

## 2019-08-08 PROCEDURE — A9270 NON-COVERED ITEM OR SERVICE: HCPCS | Performed by: INTERNAL MEDICINE

## 2019-08-08 PROCEDURE — 770006 HCHG ROOM/CARE - MED/SURG/GYN SEMI*

## 2019-08-08 PROCEDURE — 700105 HCHG RX REV CODE 258: Performed by: INTERNAL MEDICINE

## 2019-08-08 PROCEDURE — 99233 SBSQ HOSP IP/OBS HIGH 50: CPT | Performed by: INTERNAL MEDICINE

## 2019-08-08 PROCEDURE — 700111 HCHG RX REV CODE 636 W/ 250 OVERRIDE (IP): Performed by: INTERNAL MEDICINE

## 2019-08-08 PROCEDURE — 83605 ASSAY OF LACTIC ACID: CPT

## 2019-08-08 PROCEDURE — 700105 HCHG RX REV CODE 258: Performed by: HOSPITALIST

## 2019-08-08 PROCEDURE — 700111 HCHG RX REV CODE 636 W/ 250 OVERRIDE (IP): Performed by: HOSPITALIST

## 2019-08-08 PROCEDURE — 700102 HCHG RX REV CODE 250 W/ 637 OVERRIDE(OP): Performed by: INTERNAL MEDICINE

## 2019-08-08 PROCEDURE — 36415 COLL VENOUS BLD VENIPUNCTURE: CPT

## 2019-08-08 RX ORDER — SUCRALFATE ORAL 1 G/10ML
1 SUSPENSION ORAL EVERY 6 HOURS
Status: DISCONTINUED | OUTPATIENT
Start: 2019-08-08 | End: 2019-08-10

## 2019-08-08 RX ADMIN — SUCRALFATE 1 G: 1 SUSPENSION ORAL at 17:23

## 2019-08-08 RX ADMIN — DIPHENHYDRAMINE HYDROCHLORIDE AND LIDOCAINE HYDROCHLORIDE AND ALUMINUM HYDROXIDE AND MAGNESIUM HYDRO 5 ML: KIT at 09:10

## 2019-08-08 RX ADMIN — ACYCLOVIR 400 MG: 200 CAPSULE ORAL at 11:19

## 2019-08-08 RX ADMIN — CEFEPIME 2 G: 2 INJECTION, POWDER, FOR SOLUTION INTRAVENOUS at 21:56

## 2019-08-08 RX ADMIN — VANCOMYCIN HYDROCHLORIDE 1250 MG: 500 INJECTION, POWDER, LYOPHILIZED, FOR SOLUTION INTRAVENOUS at 16:41

## 2019-08-08 RX ADMIN — SENNOSIDES AND DOCUSATE SODIUM 2 TABLET: 8.6; 5 TABLET ORAL at 17:22

## 2019-08-08 RX ADMIN — SODIUM CHLORIDE: 9 INJECTION, SOLUTION INTRAVENOUS at 11:13

## 2019-08-08 RX ADMIN — SENNOSIDES AND DOCUSATE SODIUM 2 TABLET: 8.6; 5 TABLET ORAL at 05:19

## 2019-08-08 RX ADMIN — ACYCLOVIR 400 MG: 200 CAPSULE ORAL at 05:18

## 2019-08-08 RX ADMIN — SUCRALFATE 1 G: 1 SUSPENSION ORAL at 13:47

## 2019-08-08 RX ADMIN — CEFEPIME 2 G: 2 INJECTION, POWDER, FOR SOLUTION INTRAVENOUS at 05:28

## 2019-08-08 RX ADMIN — CEFEPIME 2 G: 2 INJECTION, POWDER, FOR SOLUTION INTRAVENOUS at 13:55

## 2019-08-08 RX ADMIN — DIPHENHYDRAMINE HYDROCHLORIDE AND LIDOCAINE HYDROCHLORIDE AND ALUMINUM HYDROXIDE AND MAGNESIUM HYDRO 5 ML: KIT at 21:46

## 2019-08-08 RX ADMIN — ACYCLOVIR 400 MG: 200 CAPSULE ORAL at 17:22

## 2019-08-08 RX ADMIN — ENOXAPARIN SODIUM 40 MG: 100 INJECTION SUBCUTANEOUS at 05:19

## 2019-08-08 ASSESSMENT — ENCOUNTER SYMPTOMS
NERVOUS/ANXIOUS: 0
NAUSEA: 0
SPUTUM PRODUCTION: 0
HEARTBURN: 0
FEVER: 1
DIARRHEA: 0
ABDOMINAL PAIN: 0
CHILLS: 1
FOCAL WEAKNESS: 0
COUGH: 0
MYALGIAS: 0
HEADACHES: 0
DIAPHORESIS: 0
CONSTIPATION: 0
SHORTNESS OF BREATH: 0

## 2019-08-08 NOTE — H&P
Hospital Medicine History & Physical Note    Date of Service  8/7/2019    Primary Care Physician  Hailey Bradshaw M.D.    Consultants  None.    Code Status  Full code.    Chief Complaint  Fever.    History of Presenting Illness  74 y.o. female who presented 8/7/2019 with fever measured up to 102 degrees at home and is on chemotherapy.  She is neutropenic.  Has a history of metastatic breast cancer treated by [facility].  For the last week she has had shallow painful ulcerations in her mouth and today picked up a prescription for Magic mouthwash.  She has been feeling chilled and myalgias especially with the spiking of the fever.  Denies cough, shortness of breath, sputum production, nausea, vomiting, abdominal pain, diarrhea, nonhealing wounds ulcerations or skin lesions.    Review of Systems  Review of Systems   Constitutional: Positive for chills, fever and malaise/fatigue. Negative for weight loss.   HENT: Positive for sore throat.    Respiratory: Negative.  Negative for cough and shortness of breath.    Cardiovascular: Negative.  Negative for chest pain and leg swelling.   Gastrointestinal: Negative.  Negative for abdominal pain, nausea and vomiting.   Genitourinary: Negative.  Negative for dysuria and flank pain.   Musculoskeletal: Negative.  Negative for back pain and myalgias.   Neurological: Positive for weakness. Negative for dizziness and loss of consciousness.   Endo/Heme/Allergies: Negative.  Does not bruise/bleed easily.   Psychiatric/Behavioral: Negative.  Negative for depression. The patient is not nervous/anxious.    All other systems reviewed and are negative.      Past Medical History   has a past medical history of Arthritis, Asthma, Breast cancer (HCC), Bunion, Cancer (HCC), Cancer (HCC) (2017), Hypertension (2017), Irritable bowel syndrome, Plantar fasciitis of right foot, and Prediabetes.    Surgical History   has a past surgical history that includes other; mastectomy (Left, 3/17/2017);  and node biopsy sentinel (Left, 3/17/2017).     Family History  family history includes Cancer in her father and mother; Heart Disease in her mother; Lung Disease in her father; No Known Problems in her sister.     Social History   reports that she has never smoked. She has never used smokeless tobacco. She reports that she drinks alcohol. She reports that she does not use drugs.    Allergies  Allergies   Allergen Reactions   • Tetanus-Diphth-Acell Pertussis      States that she had excessive swelling. She denies allergies to the new tetanus just the old one       Medications  Prior to Admission Medications   Prescriptions Last Dose Informant Patient Reported? Taking?   ALPRAZolam (XANAX) 0.25 MG Tab   No No   Sig: Take 1 Tab by mouth at bedtime as needed for Anxiety for up to 30 days.   B Complex Vitamins (VITAMIN B COMPLEX PO)   Yes No   Sig: Take  by mouth.   Coenzyme Q10 (COQ-10 PO)  Patient Yes No   Sig: Take 1 Tab by mouth every day.   Multiple Vitamins-Minerals (ONE-A-DAY 50 PLUS PO)   Yes No   Sig: Take  by mouth.   acyclovir (ZOVIRAX) 400 MG tablet   No No   Sig: Take 1 Tab by mouth 3 times a day.   amLODIPine (NORVASC) 5 MG Tab   No No   Sig: TAKE ONE TABLET BY MOUTH EVERY DAY   cyanocobalamin (VITAMIN B-12) 500 MCG Tab  Patient Yes No   Sig: Take 500 mcg by mouth every day.   lidocaine (XYLOCAINE) 5 % Ointment   No No   Sig: Apply thin layer on affected skin twice a day as needed   ondansetron (ZOFRAN) 8 MG Tab   Yes No   vitamin D (CHOLECALCIFEROL) 1000 UNIT Tab  Patient Yes No   Sig: Take 1,000 Units by mouth every day.      Facility-Administered Medications: None       Physical Exam  Temp:  [36.9 °C (98.4 °F)-38.1 °C (100.5 °F)] 38.1 °C (100.5 °F)  Pulse:  [] 96  Resp:  [22] 22  BP: (116-128)/(57-69) 128/58  SpO2:  [92 %-96 %] 92 %    Physical Exam   Constitutional: She is oriented to person, place, and time. She appears well-developed and well-nourished. No distress.   Plan of care discussed  with bedside RN.   HENT:   Nose: Nose normal.   Mouth/Throat: No oropharyngeal exudate.   Shallow, white, red ringed round ulcerations on palate, lower lip and buccal surface, about 5 lesions total.   Eyes: Conjunctivae are normal. Right eye exhibits no discharge. Left eye exhibits no discharge. No scleral icterus.   Neck: No JVD present. No tracheal deviation present.   Cardiovascular: Normal rate, regular rhythm and normal heart sounds.   Pulmonary/Chest: Effort normal. No stridor. No respiratory distress. She has no wheezes. She has rales (crackles present left lower lobe). She exhibits no tenderness.   Implanted port right upper chest wall, not tender, no erythema or exudate associated with it.   Abdominal: Soft. Bowel sounds are normal. She exhibits no distension. There is no tenderness.   Musculoskeletal: She exhibits no edema or tenderness.   Neurological: She is alert and oriented to person, place, and time. No cranial nerve deficit. She exhibits normal muscle tone.   Skin: Skin is warm and dry. She is not diaphoretic. No pallor.   Psychiatric: She has a normal mood and affect. Her behavior is normal. Judgment and thought content normal.   Nursing note and vitals reviewed.      Laboratory:  Recent Labs     08/07/19  1603   WBC 0.8*   RBC 4.71   HEMOGLOBIN 13.8   HEMATOCRIT 40.6   MCV 86.2   MCH 29.3   MCHC 34.0   RDW 38.1   PLATELETCT 223   MPV 9.6     Recent Labs     08/07/19  1603   SODIUM 130*   POTASSIUM 3.4*   CHLORIDE 100   CO2 23   GLUCOSE 125*   BUN 8   CREATININE 0.72   CALCIUM 8.6     Recent Labs     08/07/19  1603   ALTSGPT 20   ASTSGOT 21   ALKPHOSPHAT 65   TBILIRUBIN 1.1   GLUCOSE 125*         No results for input(s): NTPROBNP in the last 72 hours.      No results for input(s): TROPONINT in the last 72 hours.    Urinalysis:    Recent Labs     08/07/19  1603   SPECGRAVITY 1.010   GLUCOSEUR Negative   KETONES Negative   NITRITE Negative   LEUKESTERAS Negative        Imaging:  DX-CHEST-PORTABLE (1  VIEW)   Final Result      1.  No acute cardiopulmonary abnormality identified.      2.  Right upper lobe mass and left lower lobe pulmonary nodule      3.  Right internal jugular catheter appears appropriately located            Assessment/Plan:  I anticipate this patient will require at least two midnights for appropriate medical management, necessitating inpatient admission.    Neutropenic fever (HCC)- (present on admission)  Assessment & Plan  Admit for antibiotics, cultures surveillance.  Does have left lower lung field crackles, and infiltrate could be obscured by the cardiac silhouette.  Treat for simple sepsis protocol for neutropenic fever.  Cultures drawn.  Follow for the next 72 hours.  ID consultation if she has recurrence of fever or positive blood cultures.  She does have a port that could be source of infection as well.    Sepsis (HCC)  Assessment & Plan  This is sepsis (without associated acute organ dysfunction).   Sepsis orders set completed.  Neutropenic fever, possibly pneumonia, exact source not known at this time.  Trend lactic acid levels.  Blood cultures drawn.  Start IV cefepime and vancomycin.  Cultures surveillance.  Sepsis bolus 30 cc/kg prn for elevated lactate level or hypotension with SBP less than 90.      Aphthous ulcer of mouth- (present on admission)  Assessment & Plan  Side effects of chemotherapy, likely viral.  Continue acyclovir, patient has brought in her own Magic mouthwash prescription, pharmacy to approve.    PEDRO (generalized anxiety disorder)- (present on admission)  Assessment & Plan  Continue alprazolam 0.25 mg oral as needed at at bedtime.  She has access to behavioral health services and states that her symptoms are in remission.    Metastatic breast carcinoma_[facility]- (present on admission)  Assessment & Plan  Patient is under treatment by [facility].  She is currently getting chemotherapy treatment.    Essential hypertension- (present on  admission)  Assessment & Plan  Continue amlodipine      VTE prophylaxis: lovenox.

## 2019-08-08 NOTE — PROGRESS NOTES
Pt admitted to unit via stretcher.walked to bed from stretcher.  Alert and oriented x4. Compliant. Reports she is freezing.extra blankets applied.increased temp in room. Temp-99.8. meds given in er per report for fever. Skin assessment performed- negative assessment excluding right chest port.steri strips at port site remain dry and intact. Port placed 7/30/19.  Port patent and infusing iv fluids @75.  at bedside. Pt reports med list is up to date. Ambulated to bathroom x1 assist. Pt reports no issues with voiding. Encouraged rest and fluids this evening. Pt agrees with plan. Pt reports some small cuts/irritation in mouth, magic wash in fridge and administered at med pass.

## 2019-08-08 NOTE — ASSESSMENT & PLAN NOTE
Patient is under treatment by [facility].  She is currently getting chemotherapy treatment.  Treatment on hold for now, to resume as scheduled on 8/20 per patient

## 2019-08-08 NOTE — PROGRESS NOTES
Pt resting quietly in bed. Iv Fluids running at 75. Med compliant, took tylenol for increased fever per orders. Up at this time toileting x1 assist. hcg bath performed. Labs drawn and back . Lactic wnl 0.9.

## 2019-08-08 NOTE — ASSESSMENT & PLAN NOTE
continue antibiotics, cultures drawn and negative  Fever resolved and neutropenia is improving, ID recommends continuing antibiotics until ANC over 1000, will monitor labs

## 2019-08-08 NOTE — ASSESSMENT & PLAN NOTE
Side effects of chemotherapy, consistent with viral outbreak.  Continue acyclovir, patient has brought in her own Magic mouthwash   Sores less pronounced and less painful per patient

## 2019-08-08 NOTE — ED NOTES
Rounded on patient.  Second BC obtained, PORT accessed.  Medicated per orders. No additional needs at this time.

## 2019-08-08 NOTE — PROGRESS NOTES
"Pharmacy Kinetics 74 y.o. female on vancomycin day # 2 2019    Currently on Vancomycin 1750 mg IV Loading dose given at 1722 hours on 19    Indication for Treatment: Neutropenic Fever    Pertinent history per medical record: Admitted on 2019 for Neutropenic fever , history of metastatic breast cancer.    Other antibiotics: Cefepime 2 Gm IV every 8 hours.    Allergies: Tetanus-diphth-acell pertussis     List concerns for renal function:  Age  Pertinent cultures to date:   19 peripheral BCs - pending    MRSA nares swab if pneumonia is a concern (ordered/positive/negative/n-a): N/A    Recent Labs     19  1603   WBC 0.8*   NEUTSPOLYS 2.00*     Recent Labs     19  1603   BUN 8   CREATININE 0.72   ALBUMIN 3.5     No results for input(s): VANCOTROUGH, VANCOPEAK, VANCORANDOM in the last 72 hours.    Intake/Output Summary (Last 24 hours) at 2019 1250  Last data filed at 2019 1200  Gross per 24 hour   Intake 1802.6 ml   Output --   Net 1802.6 ml      /62   Pulse (!) 105   Temp 37.4 °C (99.4 °F) (Oral)   Resp 20   Ht 1.6 m (5' 3\")   Wt 68.5 kg (151 lb 0.2 oz)   SpO2 95%  Temp (24hrs), Av.4 °C (99.3 °F), Min:36.9 °C (98.4 °F), Max:38.1 °C (100.5 °F)      A/P   1. Vancomycin dose change: 1250 mg IV every 24 hours  2. Next vancomycin level: tomorrow prior to dose.  3. Goal trough: 16-20 mcg/ml  4. Comments: Will monitor and adjust regimen as per protocol.    Pierce Anguiano [facility]    "

## 2019-08-08 NOTE — CARE PLAN
Problem: Infection  Goal: Will remain free from infection  Note:   Pt on neutropenic precautions     Problem: Knowledge Deficit  Goal: Knowledge of disease process/condition, treatment plan, diagnostic tests, and medications will improve  Note:   Pt educated on lactic acid results, name of IV antibiotics, and POC

## 2019-08-08 NOTE — PROGRESS NOTES
Hospital Medicine Daily Progress Note    Date of Service  8/8/2019    Chief Complaint  74 y.o. female admitted 8/7/2019 with fevers while on chemotherapy    Hospital Course    74 y.o. female who presented 8/7/2019 with fever measured up to 102 degrees at home and is on chemotherapy.  She is neutropenic.  Has a history of metastatic breast cancer treated by [facility].  For the last week she has had shallow painful ulcerations in her mouth and today picked up a prescription for Magic mouthwash.  She has been feeling chilled and myalgias especially with the spiking of the fever.      Interval Problem Update  Fevers overnight, she is neutropenic, blood cultures are drawn    Consultants/Specialty  none    Code Status  full    Disposition  home    Review of Systems  Review of Systems   Constitutional: Positive for chills and fever. Negative for diaphoresis.   HENT:        Mouth sores   Respiratory: Negative for cough, sputum production and shortness of breath.    Cardiovascular: Negative for chest pain.   Gastrointestinal: Negative for abdominal pain, constipation, diarrhea, heartburn and nausea.   Genitourinary: Negative for dysuria and urgency.   Musculoskeletal: Negative for myalgias.   Skin: Negative for rash.   Neurological: Negative for focal weakness and headaches.   Psychiatric/Behavioral: The patient is not nervous/anxious.         Physical Exam  Temp:  [36.9 °C (98.4 °F)-38.1 °C (100.5 °F)] 37.4 °C (99.4 °F)  Pulse:  [] 105  Resp:  [20-22] 20  BP: (112-142)/(43-69) 129/62  SpO2:  [92 %-96 %] 95 %    Physical Exam   Constitutional: She is oriented to person, place, and time. No distress.   HENT:   Mouth/Throat: Oropharynx is clear and moist.   Eyes: Conjunctivae are normal. No scleral icterus.   Neck: Neck supple.   Cardiovascular: Normal rate and regular rhythm.   No murmur heard.  Pulmonary/Chest: Effort normal and breath sounds normal. She has no wheezes. She has no rales.   Abdominal: Soft.  She exhibits no distension. There is no tenderness.   Musculoskeletal: She exhibits no edema.   Neurological: She is alert and oriented to person, place, and time.   Skin: Skin is warm and dry. She is not diaphoretic.   Psychiatric: Her behavior is normal.   Nursing note and vitals reviewed.      Fluids    Intake/Output Summary (Last 24 hours) at 8/8/2019 0756  Last data filed at 8/7/2019 2200  Gross per 24 hour   Intake 1562.6 ml   Output --   Net 1562.6 ml       Laboratory  Recent Labs     08/07/19  1603   WBC 0.8*   RBC 4.71   HEMOGLOBIN 13.8   HEMATOCRIT 40.6   MCV 86.2   MCH 29.3   MCHC 34.0   RDW 38.1   PLATELETCT 223   MPV 9.6     Recent Labs     08/07/19  1603   SODIUM 130*   POTASSIUM 3.4*   CHLORIDE 100   CO2 23   GLUCOSE 125*   BUN 8   CREATININE 0.72   CALCIUM 8.6                   Imaging  DX-CHEST-PORTABLE (1 VIEW)   Final Result      1.  No acute cardiopulmonary abnormality identified.      2.  Right upper lobe mass and left lower lobe pulmonary nodule      3.  Right internal jugular catheter appears appropriately located           Assessment/Plan  Sepsis (HCC)  Assessment & Plan  This is sepsis (without associated acute organ dysfunction).   Monitor labs and fevers  Continue cefepime and vancomycin    Neutropenic fever (HCC)- (present on admission)  Assessment & Plan  continue antibiotics, cultures drawn, will monitor for growth  Place neutropenic precautions  Continue antiviral treatment  ID consultation if she has recurrence of fever or positive blood cultures.  She does have a port that could be source of infection as well.    Aphthous ulcer of mouth- (present on admission)  Assessment & Plan  Side effects of chemotherapy, consistent with viral outbreak.  Continue acyclovir, patient has brought in her own Magic mouthwash     PEDRO (generalized anxiety disorder)- (present on admission)  Assessment & Plan  Continue alprazolam 0.25 mg oral as needed at at bedtime.  She has access to behavioral health  services and states that her symptoms are currently in remission.    Metastatic breast carcinoma_[facility]- (present on admission)  Assessment & Plan  Patient is under treatment by [facility] oncology.  She is currently getting chemotherapy treatment.  Treatment on hold for fevers    Essential hypertension- (present on admission)  Assessment & Plan  Continue amlodipine         VTE prophylaxis: lovenox

## 2019-08-08 NOTE — ASSESSMENT & PLAN NOTE
Continue alprazolam 0.25 mg oral as needed at at bedtime.  She has access to behavioral health services and states that her symptoms are currently in remission.

## 2019-08-09 LAB
ANION GAP SERPL CALC-SCNC: 5 MMOL/L (ref 0–11.9)
ANISOCYTOSIS BLD QL SMEAR: ABNORMAL
BASOPHILS # BLD AUTO: 0 % (ref 0–1.8)
BASOPHILS # BLD: 0 K/UL (ref 0–0.12)
BUN SERPL-MCNC: 5 MG/DL (ref 8–22)
CALCIUM SERPL-MCNC: 7.8 MG/DL (ref 8.4–10.2)
CHLORIDE SERPL-SCNC: 108 MMOL/L (ref 96–112)
CO2 SERPL-SCNC: 24 MMOL/L (ref 20–33)
CREAT SERPL-MCNC: 0.58 MG/DL (ref 0.5–1.4)
EOSINOPHIL # BLD AUTO: 0 K/UL (ref 0–0.51)
EOSINOPHIL NFR BLD: 0 % (ref 0–6.9)
ERYTHROCYTE [DISTWIDTH] IN BLOOD BY AUTOMATED COUNT: 39.5 FL (ref 35.9–50)
GLUCOSE SERPL-MCNC: 103 MG/DL (ref 65–99)
HCT VFR BLD AUTO: 36.4 % (ref 37–47)
HGB BLD-MCNC: 12.3 G/DL (ref 12–16)
LYMPHOCYTES # BLD AUTO: 1.4 K/UL (ref 1–4.8)
LYMPHOCYTES NFR BLD: 78 % (ref 22–41)
MANUAL DIFF BLD: NORMAL
MCH RBC QN AUTO: 29.4 PG (ref 27–33)
MCHC RBC AUTO-ENTMCNC: 33.8 G/DL (ref 33.6–35)
MCV RBC AUTO: 87.1 FL (ref 81.4–97.8)
MONOCYTES # BLD AUTO: 0.36 K/UL (ref 0–0.85)
MONOCYTES NFR BLD AUTO: 20 % (ref 0–13.4)
NEUTROPHILS # BLD AUTO: 0.04 K/UL (ref 2–7.15)
NEUTROPHILS NFR BLD: 2 % (ref 44–72)
NRBC # BLD AUTO: 0 K/UL
NRBC BLD-RTO: 0 /100 WBC
PLATELET # BLD AUTO: 207 K/UL (ref 164–446)
PLATELET BLD QL SMEAR: NORMAL
PMV BLD AUTO: 9.4 FL (ref 9–12.9)
POTASSIUM SERPL-SCNC: 3.2 MMOL/L (ref 3.6–5.5)
RBC # BLD AUTO: 4.18 M/UL (ref 4.2–5.4)
RBC BLD AUTO: PRESENT
SODIUM SERPL-SCNC: 137 MMOL/L (ref 135–145)
VANCOMYCIN TROUGH SERPL-MCNC: <3.5 UG/ML (ref 10–20)
WBC # BLD AUTO: 1.8 K/UL (ref 4.8–10.8)

## 2019-08-09 PROCEDURE — A9270 NON-COVERED ITEM OR SERVICE: HCPCS | Performed by: HOSPITALIST

## 2019-08-09 PROCEDURE — 85007 BL SMEAR W/DIFF WBC COUNT: CPT

## 2019-08-09 PROCEDURE — A9270 NON-COVERED ITEM OR SERVICE: HCPCS | Performed by: INTERNAL MEDICINE

## 2019-08-09 PROCEDURE — 700102 HCHG RX REV CODE 250 W/ 637 OVERRIDE(OP): Performed by: INTERNAL MEDICINE

## 2019-08-09 PROCEDURE — 770006 HCHG ROOM/CARE - MED/SURG/GYN SEMI*

## 2019-08-09 PROCEDURE — 700111 HCHG RX REV CODE 636 W/ 250 OVERRIDE (IP): Performed by: HOSPITALIST

## 2019-08-09 PROCEDURE — 700111 HCHG RX REV CODE 636 W/ 250 OVERRIDE (IP): Performed by: INTERNAL MEDICINE

## 2019-08-09 PROCEDURE — 700105 HCHG RX REV CODE 258: Performed by: HOSPITALIST

## 2019-08-09 PROCEDURE — 85027 COMPLETE CBC AUTOMATED: CPT

## 2019-08-09 PROCEDURE — 700102 HCHG RX REV CODE 250 W/ 637 OVERRIDE(OP): Performed by: HOSPITALIST

## 2019-08-09 PROCEDURE — 80048 BASIC METABOLIC PNL TOTAL CA: CPT

## 2019-08-09 PROCEDURE — 80202 ASSAY OF VANCOMYCIN: CPT

## 2019-08-09 PROCEDURE — 700105 HCHG RX REV CODE 258: Performed by: INTERNAL MEDICINE

## 2019-08-09 PROCEDURE — 36415 COLL VENOUS BLD VENIPUNCTURE: CPT

## 2019-08-09 PROCEDURE — 99232 SBSQ HOSP IP/OBS MODERATE 35: CPT | Performed by: INTERNAL MEDICINE

## 2019-08-09 RX ORDER — IBUPROFEN 600 MG/1
600 TABLET ORAL EVERY 6 HOURS PRN
Status: DISCONTINUED | OUTPATIENT
Start: 2019-08-09 | End: 2019-08-11 | Stop reason: HOSPADM

## 2019-08-09 RX ORDER — ACETAMINOPHEN 325 MG/1
650 TABLET ORAL EVERY 4 HOURS PRN
Status: DISCONTINUED | OUTPATIENT
Start: 2019-08-09 | End: 2019-08-11 | Stop reason: HOSPADM

## 2019-08-09 RX ORDER — ECHINACEA PURPUREA EXTRACT 125 MG
2 TABLET ORAL
Status: DISCONTINUED | OUTPATIENT
Start: 2019-08-09 | End: 2019-08-11 | Stop reason: HOSPADM

## 2019-08-09 RX ADMIN — SUCRALFATE 1 G: 1 SUSPENSION ORAL at 00:08

## 2019-08-09 RX ADMIN — CEFEPIME 2 G: 2 INJECTION, POWDER, FOR SOLUTION INTRAVENOUS at 05:23

## 2019-08-09 RX ADMIN — DIPHENHYDRAMINE HYDROCHLORIDE AND LIDOCAINE HYDROCHLORIDE AND ALUMINUM HYDROXIDE AND MAGNESIUM HYDRO 5 ML: KIT at 22:09

## 2019-08-09 RX ADMIN — CEFEPIME 2 G: 2 INJECTION, POWDER, FOR SOLUTION INTRAVENOUS at 13:52

## 2019-08-09 RX ADMIN — DIPHENHYDRAMINE HYDROCHLORIDE AND LIDOCAINE HYDROCHLORIDE AND ALUMINUM HYDROXIDE AND MAGNESIUM HYDRO 5 ML: KIT at 09:51

## 2019-08-09 RX ADMIN — ACYCLOVIR 400 MG: 200 CAPSULE ORAL at 11:41

## 2019-08-09 RX ADMIN — ACYCLOVIR 400 MG: 200 CAPSULE ORAL at 05:23

## 2019-08-09 RX ADMIN — SUCRALFATE 1 G: 1 SUSPENSION ORAL at 05:23

## 2019-08-09 RX ADMIN — ACYCLOVIR 400 MG: 200 CAPSULE ORAL at 17:25

## 2019-08-09 RX ADMIN — IBUPROFEN 600 MG: 600 TABLET ORAL at 13:13

## 2019-08-09 RX ADMIN — IBUPROFEN 600 MG: 600 TABLET ORAL at 01:13

## 2019-08-09 RX ADMIN — VANCOMYCIN HYDROCHLORIDE 1000 MG: 500 INJECTION, POWDER, LYOPHILIZED, FOR SOLUTION INTRAVENOUS at 17:26

## 2019-08-09 RX ADMIN — SALINE NASAL SPRAY 2 SPRAY: 1.5 SOLUTION NASAL at 09:51

## 2019-08-09 RX ADMIN — CEFEPIME 2 G: 2 INJECTION, POWDER, FOR SOLUTION INTRAVENOUS at 22:06

## 2019-08-09 RX ADMIN — SODIUM CHLORIDE: 9 INJECTION, SOLUTION INTRAVENOUS at 03:11

## 2019-08-09 RX ADMIN — SUCRALFATE 1 G: 1 SUSPENSION ORAL at 17:26

## 2019-08-09 RX ADMIN — SUCRALFATE 1 G: 1 SUSPENSION ORAL at 11:41

## 2019-08-09 ASSESSMENT — ENCOUNTER SYMPTOMS
FEVER: 0
SORE THROAT: 1
HEADACHES: 0
STRIDOR: 0
PALPITATIONS: 0
HEARTBURN: 0
DIZZINESS: 0
CHILLS: 0
ABDOMINAL PAIN: 0
CONSTIPATION: 0
SHORTNESS OF BREATH: 0
WHEEZING: 0
FOCAL WEAKNESS: 0
COUGH: 0
MYALGIAS: 0
NERVOUS/ANXIOUS: 0
DIARRHEA: 0
NAUSEA: 0

## 2019-08-09 NOTE — PROGRESS NOTES
Dr. Parada called back. MD made aware regarding pt's request of ibuprofen for fever. New orders of Ibuprofen and Tylenol received. MD updated MAR.

## 2019-08-09 NOTE — PROGRESS NOTES
Hospital Medicine Daily Progress Note    Date of Service  8/9/2019    Chief Complaint  74 y.o. female admitted 8/7/2019 with fevers while on chemotherapy    Hospital Course    74 y.o. female who presented 8/7/2019 with fever measured up to 102 degrees at home and is on chemotherapy.  She is neutropenic.  Has a history of metastatic breast cancer treated by [facility].  For the last week she has had shallow painful ulcerations in her mouth and today picked up a prescription for Magic mouthwash.  She has been feeling chilled and myalgias especially with the spiking of the fever.      Interval Problem Update  Temperature has improved to below 100 the last 24 hours  Cultures remain negative  Sore throat persists    Consultants/Specialty  none    Code Status  full    Disposition  home    Review of Systems  Review of Systems   Constitutional: Positive for malaise/fatigue. Negative for chills and fever.   HENT: Positive for sore throat.         Mouth sores   Respiratory: Negative for cough, shortness of breath, wheezing and stridor.    Cardiovascular: Negative for chest pain and palpitations.   Gastrointestinal: Negative for abdominal pain, constipation, diarrhea, heartburn and nausea.   Genitourinary: Negative for dysuria and urgency.   Musculoskeletal: Negative for myalgias.   Skin: Negative for itching and rash.   Neurological: Negative for dizziness, focal weakness and headaches.   Psychiatric/Behavioral: The patient is not nervous/anxious.         Physical Exam  Temp:  [36.7 °C (98.1 °F)-37.3 °C (99.2 °F)] 36.8 °C (98.3 °F)  Pulse:  [86-95] 86  Resp:  [20] 20  BP: (115-125)/(42-56) 119/52  SpO2:  [93 %-95 %] 93 %    Physical Exam   Constitutional: She is oriented to person, place, and time. No distress.   HENT:   Mouth/Throat: Oropharynx is clear and moist.   Throat red   Eyes: Conjunctivae are normal. No scleral icterus.   Neck: Neck supple. No tracheal deviation present.   Cardiovascular: Normal rate and  regular rhythm.   No murmur heard.  Pulmonary/Chest: Effort normal and breath sounds normal. She has no wheezes. She has no rales.   Abdominal: Soft. There is no tenderness.   Musculoskeletal: She exhibits no edema.   Neurological: She is alert and oriented to person, place, and time.   Skin: Skin is warm. No rash noted. She is not diaphoretic. No erythema.   Psychiatric: Her behavior is normal.   Nursing note and vitals reviewed.      Fluids    Intake/Output Summary (Last 24 hours) at 8/9/2019 0808  Last data filed at 8/9/2019 0307  Gross per 24 hour   Intake 968.75 ml   Output 250 ml   Net 718.75 ml       Laboratory  Recent Labs     08/07/19  1603 08/09/19  0418   WBC 0.8* 1.8*   RBC 4.71 4.18*   HEMOGLOBIN 13.8 12.3   HEMATOCRIT 40.6 36.4*   MCV 86.2 87.1   MCH 29.3 29.4   MCHC 34.0 33.8   RDW 38.1 39.5   PLATELETCT 223 207   MPV 9.6 9.4     Recent Labs     08/07/19  1603 08/09/19  0418   SODIUM 130* 137   POTASSIUM 3.4* 3.2*   CHLORIDE 100 108   CO2 23 24   GLUCOSE 125* 103*   BUN 8 5*   CREATININE 0.72 0.58   CALCIUM 8.6 7.8*                   Imaging  DX-CHEST-PORTABLE (1 VIEW)   Final Result      1.  No acute cardiopulmonary abnormality identified.      2.  Right upper lobe mass and left lower lobe pulmonary nodule      3.  Right internal jugular catheter appears appropriately located           Assessment/Plan  Sepsis (HCC)  Assessment & Plan  This is sepsis (without associated acute organ dysfunction).   Monitor cultures for any growth, fever below 100 overnight  Continue cefepime and vancomycin    Neutropenic fever (HCC)- (present on admission)  Assessment & Plan  continue antibiotics, cultures drawn, will monitor for growth  Fever resolved and neutropenia is improving, will monitor    Aphthous ulcer of mouth- (present on admission)  Assessment & Plan  Side effects of chemotherapy, consistent with viral outbreak.  Continue acyclovir, patient has brought in her own Magic mouthwash   Added carafate for  symptoms as well, no thrush on exam    PEDRO (generalized anxiety disorder)- (present on admission)  Assessment & Plan  Continue alprazolam 0.25 mg oral as needed at at bedtime.  She has access to behavioral health services and states that her symptoms are currently in remission.    Metastatic breast carcinoma_[facility]- (present on admission)  Assessment & Plan  Patient is under treatment by [facility] oncology.  She is currently getting chemotherapy treatment.  Treatment on hold for now, to resume as scheduled    Essential hypertension- (present on admission)  Assessment & Plan  Continue amlodipine       VTE prophylaxis: lovenox

## 2019-08-09 NOTE — PROGRESS NOTES
[facility] from Lab called with critical result of WBC at 1.8 and Abs Neutrophil at 0.04. Critical lab result read back to [facility].   Dr. Parada is aware.

## 2019-08-09 NOTE — CARE PLAN
Problem: Infection  Goal: Will remain free from infection  Outcome: PROGRESSING AS EXPECTED    Pt on IV antibiotic. Denies chills or high fever. Vitals and temperature monitored. Tylenol and Ibuprofen available PRN.     Problem: Pain Management  Goal: Pain level will decrease to patient's comfort goal  Outcome: PROGRESSING AS EXPECTED   Pt c/o pain d/t mouth sore at left upper soft palate and at lower lip. Magic mouthwash oral suspension given per MAR.

## 2019-08-09 NOTE — CARE PLAN
Problem: Infection  Goal: Will remain free from infection  Note:   Pt with neutropenic precautions in place, instructed to hand wash before eating and after using bathroom.     Problem: Pain Management  Goal: Pain level will decrease to patient's comfort goal  Note:   Pt with 8/10 mouth & throat pain. Pt given magic mouthwash per MAR.

## 2019-08-09 NOTE — PROGRESS NOTES
Pt AAOx4 C/o pain from mouth sore 8/10, opted to rest and refuses any pain med. Denies any SOB, chill, fever. Reports feeling better today. POC discussed with pt including protective precautions, medications, diet, the need for CHG bath and routing lab. Pt requested not to draw blood from port for routine lab tomorrow. caren Yost RN made aware. Safety measures in place. No additional needs at this time.

## 2019-08-10 ENCOUNTER — PATIENT OUTREACH (OUTPATIENT)
Dept: HEALTH INFORMATION MANAGEMENT | Facility: OTHER | Age: 74
End: 2019-08-10

## 2019-08-10 LAB
ANION GAP SERPL CALC-SCNC: 5 MMOL/L (ref 0–11.9)
ANISOCYTOSIS BLD QL SMEAR: ABNORMAL
BACTERIA UR CULT: NORMAL
BASOPHILS # BLD AUTO: 0 % (ref 0–1.8)
BASOPHILS # BLD: 0 K/UL (ref 0–0.12)
BUN SERPL-MCNC: <5 MG/DL (ref 8–22)
CALCIUM SERPL-MCNC: 8.3 MG/DL (ref 8.4–10.2)
CHLORIDE SERPL-SCNC: 109 MMOL/L (ref 96–112)
CO2 SERPL-SCNC: 27 MMOL/L (ref 20–33)
CREAT SERPL-MCNC: 0.57 MG/DL (ref 0.5–1.4)
EOSINOPHIL # BLD AUTO: 0 K/UL (ref 0–0.51)
EOSINOPHIL NFR BLD: 0 % (ref 0–6.9)
ERYTHROCYTE [DISTWIDTH] IN BLOOD BY AUTOMATED COUNT: 39.4 FL (ref 35.9–50)
GLUCOSE SERPL-MCNC: 99 MG/DL (ref 65–99)
HCT VFR BLD AUTO: 36.1 % (ref 37–47)
HGB BLD-MCNC: 12.3 G/DL (ref 12–16)
LYMPHOCYTES # BLD AUTO: 1.9 K/UL (ref 1–4.8)
LYMPHOCYTES NFR BLD: 68 % (ref 22–41)
MANUAL DIFF BLD: NORMAL
MCH RBC QN AUTO: 29.4 PG (ref 27–33)
MCHC RBC AUTO-ENTMCNC: 34.1 G/DL (ref 33.6–35)
MCV RBC AUTO: 86.2 FL (ref 81.4–97.8)
METAMYELOCYTES NFR BLD MANUAL: 1 %
MONOCYTES # BLD AUTO: 0.5 K/UL (ref 0–0.85)
MONOCYTES NFR BLD AUTO: 18 % (ref 0–13.4)
NEUTROPHILS # BLD AUTO: 0.36 K/UL (ref 2–7.15)
NEUTROPHILS NFR BLD: 9 % (ref 44–72)
NEUTS BAND NFR BLD MANUAL: 4 % (ref 0–10)
NRBC # BLD AUTO: 0 K/UL
NRBC BLD-RTO: 0 /100 WBC
PLATELET # BLD AUTO: 219 K/UL (ref 164–446)
PLATELET BLD QL SMEAR: NORMAL
PMV BLD AUTO: 9.3 FL (ref 9–12.9)
POLYCHROMASIA BLD QL SMEAR: NORMAL
POTASSIUM SERPL-SCNC: 3.3 MMOL/L (ref 3.6–5.5)
RBC # BLD AUTO: 4.19 M/UL (ref 4.2–5.4)
RBC BLD AUTO: PRESENT
SIGNIFICANT IND 70042: NORMAL
SITE SITE: NORMAL
SODIUM SERPL-SCNC: 141 MMOL/L (ref 135–145)
SOURCE SOURCE: NORMAL
WBC # BLD AUTO: 2.8 K/UL (ref 4.8–10.8)

## 2019-08-10 PROCEDURE — 700102 HCHG RX REV CODE 250 W/ 637 OVERRIDE(OP): Performed by: HOSPITALIST

## 2019-08-10 PROCEDURE — 99223 1ST HOSP IP/OBS HIGH 75: CPT | Performed by: INTERNAL MEDICINE

## 2019-08-10 PROCEDURE — A9270 NON-COVERED ITEM OR SERVICE: HCPCS | Performed by: INTERNAL MEDICINE

## 2019-08-10 PROCEDURE — A9270 NON-COVERED ITEM OR SERVICE: HCPCS | Performed by: HOSPITALIST

## 2019-08-10 PROCEDURE — 700111 HCHG RX REV CODE 636 W/ 250 OVERRIDE (IP): Performed by: HOSPITALIST

## 2019-08-10 PROCEDURE — 99233 SBSQ HOSP IP/OBS HIGH 50: CPT | Performed by: INTERNAL MEDICINE

## 2019-08-10 PROCEDURE — 700105 HCHG RX REV CODE 258: Performed by: HOSPITALIST

## 2019-08-10 PROCEDURE — 85027 COMPLETE CBC AUTOMATED: CPT

## 2019-08-10 PROCEDURE — 770006 HCHG ROOM/CARE - MED/SURG/GYN SEMI*

## 2019-08-10 PROCEDURE — 700105 HCHG RX REV CODE 258: Performed by: INTERNAL MEDICINE

## 2019-08-10 PROCEDURE — 700101 HCHG RX REV CODE 250: Performed by: INTERNAL MEDICINE

## 2019-08-10 PROCEDURE — 85007 BL SMEAR W/DIFF WBC COUNT: CPT

## 2019-08-10 PROCEDURE — 700111 HCHG RX REV CODE 636 W/ 250 OVERRIDE (IP): Performed by: INTERNAL MEDICINE

## 2019-08-10 PROCEDURE — 80048 BASIC METABOLIC PNL TOTAL CA: CPT

## 2019-08-10 PROCEDURE — 700102 HCHG RX REV CODE 250 W/ 637 OVERRIDE(OP): Performed by: INTERNAL MEDICINE

## 2019-08-10 RX ORDER — LACTOBACILLUS RHAMNOSUS GG 10B CELL
1 CAPSULE ORAL
Qty: 5 CAP | Refills: 0 | Status: SHIPPED | OUTPATIENT
Start: 2019-08-10 | End: 2019-08-15

## 2019-08-10 RX ORDER — SUCRALFATE ORAL 1 G/10ML
1 SUSPENSION ORAL EVERY 6 HOURS PRN
Status: DISCONTINUED | OUTPATIENT
Start: 2019-08-10 | End: 2019-08-11 | Stop reason: HOSPADM

## 2019-08-10 RX ORDER — BACITRACIN ZINC AND POLYMYXIN B SULFATE 500; 1000 [USP'U]/G; [USP'U]/G
OINTMENT TOPICAL 2 TIMES DAILY PRN
Status: DISCONTINUED | OUTPATIENT
Start: 2019-08-10 | End: 2019-08-11 | Stop reason: HOSPADM

## 2019-08-10 RX ORDER — LACTOBACILLUS RHAMNOSUS GG 10B CELL
1 CAPSULE ORAL
Status: DISCONTINUED | OUTPATIENT
Start: 2019-08-10 | End: 2019-08-11 | Stop reason: HOSPADM

## 2019-08-10 RX ADMIN — DIPHENHYDRAMINE HYDROCHLORIDE AND LIDOCAINE HYDROCHLORIDE AND ALUMINUM HYDROXIDE AND MAGNESIUM HYDRO 5 ML: KIT at 11:22

## 2019-08-10 RX ADMIN — DIPHENHYDRAMINE HYDROCHLORIDE AND LIDOCAINE HYDROCHLORIDE AND ALUMINUM HYDROXIDE AND MAGNESIUM HYDRO 5 ML: KIT at 05:14

## 2019-08-10 RX ADMIN — VANCOMYCIN HYDROCHLORIDE 1000 MG: 500 INJECTION, POWDER, LYOPHILIZED, FOR SOLUTION INTRAVENOUS at 18:36

## 2019-08-10 RX ADMIN — SUCRALFATE 1 G: 1 SUSPENSION ORAL at 14:38

## 2019-08-10 RX ADMIN — AMLODIPINE BESYLATE 5 MG: 5 TABLET ORAL at 05:04

## 2019-08-10 RX ADMIN — CEFEPIME 2 G: 2 INJECTION, POWDER, FOR SOLUTION INTRAVENOUS at 14:18

## 2019-08-10 RX ADMIN — SUCRALFATE 1 G: 1 SUSPENSION ORAL at 00:00

## 2019-08-10 RX ADMIN — SUCRALFATE 1 G: 1 SUSPENSION ORAL at 23:59

## 2019-08-10 RX ADMIN — VANCOMYCIN HYDROCHLORIDE 1000 MG: 500 INJECTION, POWDER, LYOPHILIZED, FOR SOLUTION INTRAVENOUS at 05:44

## 2019-08-10 RX ADMIN — DIPHENHYDRAMINE HYDROCHLORIDE AND LIDOCAINE HYDROCHLORIDE AND ALUMINUM HYDROXIDE AND MAGNESIUM HYDRO 5 ML: KIT at 18:36

## 2019-08-10 RX ADMIN — Medication 1 CAPSULE: at 11:21

## 2019-08-10 RX ADMIN — SUCRALFATE 1 G: 1 SUSPENSION ORAL at 05:04

## 2019-08-10 RX ADMIN — ACYCLOVIR 400 MG: 200 CAPSULE ORAL at 11:21

## 2019-08-10 RX ADMIN — CEFEPIME 2 G: 2 INJECTION, POWDER, FOR SOLUTION INTRAVENOUS at 05:04

## 2019-08-10 RX ADMIN — ACYCLOVIR 400 MG: 200 CAPSULE ORAL at 05:04

## 2019-08-10 RX ADMIN — ACYCLOVIR 400 MG: 200 CAPSULE ORAL at 18:23

## 2019-08-10 RX ADMIN — BACITRACIN ZINC AND POLYMYXIN B SULFATE: 500; 10000 OINTMENT TOPICAL at 11:21

## 2019-08-10 ASSESSMENT — ENCOUNTER SYMPTOMS
WHEEZING: 0
NAUSEA: 0
DIZZINESS: 0
FEVER: 0
COUGH: 0
STRIDOR: 0
HEARTBURN: 0
ABDOMINAL PAIN: 0
DIAPHORESIS: 0
SORE THROAT: 1
CONSTIPATION: 0
NERVOUS/ANXIOUS: 0
NECK PAIN: 0
MYALGIAS: 0
FOCAL WEAKNESS: 0

## 2019-08-10 NOTE — CONSULTS
ADULT INFECTIOUS DISEASE CONSULT     Date of Service: 8/10/2019    Consult Requested By: Fanny Cobian M.D.    Reason for Consultation: Neutropenic fever    History of Present Illness:   Katie Monroy is a 74 y.o. female with history of breast cancer status post resection and radiation, hypertension, anxiety had recently had a lung nodule discovered. She had a biopsy done which showed metastasis. She had restarted her chemo. She came into the ER on 8/7 for evaluation of fever. She also developed blistering lesion in her mouth for which she was prescribed antiviral by her oncologist. She was found to have neutropenia and febrile.  Her cultures have been negative.  The chest x-ray shows right upper lobe mass.  Infectious disease consult has been called for neutropenic fevers.  Review Of Systems:  Gen.-Complains of fevers on admission.  Has not had fevers in 24 hours. Denies any chills.  HEENT- denies any sore throat, headache or vision changes  Pulmonary- denies any cough, shortness of breath  Cardiovascular- denies any chest pain, leg swelling.    GI- denies any nausea vomiting.  Has been having some loose stools after she got stool softeners  Musculoskeletal- denies any joint pains or swelling  Neuro- denies any weakness or sensory change  Psych- denies any depression or suicidal ideation  Genitourinary- denies any frequency or dysuria        PMH:   Past Medical History:   Diagnosis Date   • Arthritis     osteo   • Asthma     does not use inhalers   • Breast cancer (HCC)    • Bunion     both feet   • Cancer (HCC)     skin   • Cancer (HCC) 2017    breast   • Hypertension 2017    pt states well controlled on meds   • Irritable bowel syndrome    • Plantar fasciitis of right foot    • Prediabetes          PSH:  Past Surgical History:   Procedure Laterality Date   • MASTECTOMY Left 3/17/2017    Procedure: MASTECTOMY - PARTIAL, WIRE LOCALIZED;  Surgeon: Katy Gastelum M.D.;  Location: SURGERY [facility];   Service:    • NODE BIOPSY SENTINEL Left 3/17/2017    Procedure: NODE BIOPSY SENTINEL;  Surgeon: Katy Gastelum M.D.;  Location: SURGERY [facility];  Service:    • OTHER      right ovary removed       FAMILY HX:  Family History   Problem Relation Age of Onset   • Cancer Mother         leukemia   • Heart Disease Mother         arrhythmia   • Cancer Father         lung   • Lung Disease Father    • No Known Problems Sister    • Diabetes Neg Hx        SOCIAL HX:  Social History     Socioeconomic History   • Marital status:      Spouse name: Not on file   • Number of children: Not on file   • Years of education: Not on file   • Highest education level: Not on file   Occupational History   • Occupation: retired-  , [employer]   Social Needs   • Financial resource strain: Not on file   • Food insecurity:     Worry: Not on file     Inability: Not on file   • Transportation needs:     Medical: Not on file     Non-medical: Not on file   Tobacco Use   • Smoking status: Never Smoker   • Smokeless tobacco: Never Used   Substance and Sexual Activity   • Alcohol use: Yes     Alcohol/week: 0.0 oz     Comment: occasional   • Drug use: No   • Sexual activity: Yes     Partners: Male   Lifestyle   • Physical activity:     Days per week: Not on file     Minutes per session: Not on file   • Stress: Not on file   Relationships   • Social connections:     Talks on phone: Not on file     Gets together: Not on file     Attends Zoroastrian service: Not on file     Active member of club or organization: Not on file     Attends meetings of clubs or organizations: Not on file     Relationship status: Not on file   • Intimate partner violence:     Fear of current or ex partner: Not on file     Emotionally abused: Not on file     Physically abused: Not on file     Forced sexual activity: Not on file   Other Topics Concern   • Not on file   Social History Narrative    Luxembourger.     .     2 daughters.       Social History     Tobacco Use   Smoking Status Never Smoker   Smokeless Tobacco Never Used     Social History     Substance and Sexual Activity   Alcohol Use Yes   • Alcohol/week: 0.0 oz    Comment: occasional       Allergies/Intolerances:  Allergies   Allergen Reactions   • Tetanus-Diphth-Acell Pertussis      States that she had excessive swelling. She denies allergies to the new tetanus just the old one       History reviewed with the patient    Other Current Medications:    Current Facility-Administered Medications:   •  lactobacillus rhamnosus (CULTURELLE) capsule 1 Cap, 1 Cap, Oral, QDAY with Breakfast, Fanny Cobian M.D.  •  sucralfate (CARAFATE) 1 GM/10ML suspension 1 g, 1 g, Oral, Q6HRS PRN, Fanny Cobian M.D.  •  bacitracin-polymyxin b (POLYSPORIN) 500-54878 UNIT/GM ointment, , Topical, BID PRN, Fanny Cobian M.D.  •  ibuprofen (MOTRIN) tablet 600 mg, 600 mg, Oral, Q6HRS PRN, Albino Parada D.O., 600 mg at 08/09/19 1313  •  acetaminophen (TYLENOL) tablet 650 mg, 650 mg, Oral, Q4HRS PRN, Albino Parada D.O.  •  sodium chloride (OCEAN) 0.65 % nasal spray 2 Spray, 2 Spray, Nasal, Q2HRS PRN, Fanny Cobian M.D., 2 [place] at 08/09/19 0951  •  vancomycin (VANCOCIN) 1,000 mg in  mL IVPB, 1,000 mg, Intravenous, Q12HR, Fanny Cobian M.D., Stopped at 08/10/19 0744  •  NS (BOLUS) infusion 1,000 mL, 1,000 mL, Intravenous, Once PRN, Randy Alamo M.D.  •  acyclovir (ZOVIRAX) capsule 400 mg, 400 mg, Oral, TID, Hailey Heard M.D., 400 mg at 08/10/19 0504  •  ALPRAZolam (XANAX) tablet 0.25 mg, 0.25 mg, Oral, HS PRN, Hailey Heard M.D.  •  amLODIPine (NORVASC) tablet 5 mg, 5 mg, Oral, QDAY, Hailey Heard M.D., 5 mg at 08/10/19 0504  •  NS infusion 2,055 mL, 30 mL/kg, Intravenous, Once PRN, Hailey Heard M.D.  •  NS (BOLUS) infusion 1,000 mL, 1,000 mL, Intravenous, Once PRN, Hailey Heard M.D.  •  enoxaparin (LOVENOX) inj 40 mg, 40 mg, Subcutaneous, DAILY, Hailey Heard M.D., 40 mg at  "19 05  •  cefepime (MAXIPIME) 2 g in  mL IVPB, 2 g, Intravenous, Q8HRS, Hailey Heard M.D., Stopped at 08/10/19 0534  •  MD Alert...Vancomycin per Pharmacy, , Other, PHARMACY TO DOSE, Hailey Heard M.D.  •  ondansetron (ZOFRAN) syringe/vial injection 4 mg, 4 mg, Intravenous, Q4HRS PRN, Haiely Heard M.D.  •  ondansetron (ZOFRAN ODT) dispertab 4 mg, 4 mg, Oral, Q4HRS PRN, Hailey Heard M.D.  •  MAGIC MOUTHWASH BLM oral suspension SUSP 5 mL, 5 mL, Oral, Q6HRS PRN, Hailey Heard M.D., 5 mL at 08/10/19 0514  [unfilled]    Most Recent Vital Signs:  /70   Pulse 85   Temp 36.7 °C (98 °F) (Oral)   Resp 17   Ht 1.6 m (5' 3\")   Wt 68.5 kg (151 lb 0.2 oz)   SpO2 94%   BMI 26.75 kg/m²   Temp  Av.2 °C (98.9 °F)  Min: 36.4 °C (97.6 °F)  Max: 38.1 °C (100.5 °F)    Physical Exam:  General: Nontoxic, no acute distress.  Pleasant  HEENT: sclera anicteric, PERRL, EOMI, MMM, has ulcerations on her lips and palate  Neck: supple, no lymphadenopathy  Chest: CTAB, no r/r/w, normal work of breathing.  Port in place right upper chest site is clean  Cardiac: Regular, no murmurs no gallops heard  Abdomen: + bowel sounds, soft, non-tender, non-distended, no HSM  Extremities: No edema. No joint swelling.  Skin: no rashes or erythema  Neuro: Alert and oriented times 3, non-focal exam    Pertinent Lab Results:  Recent Labs     19  1603 19  0418 08/10/19  0434   WBC 0.8* 1.8* 2.8*      Recent Labs     19  1603 19  0418 08/10/19  0434   HEMOGLOBIN 13.8 12.3 12.3   HEMATOCRIT 40.6 36.4* 36.1*   MCV 86.2 87.1 86.2   MCH 29.3 29.4 29.4   ANISOCYTOSIS  --  1+ 1+   PLATELETCT 223 207 219         Recent Labs     19  1603 19  0418 08/10/19  0434   SODIUM 130* 137 141   POTASSIUM 3.4* 3.2* 3.3*   CHLORIDE 100 108 109   CO2 23 24 27   CREATININE 0.72 0.58 0.57        Recent Labs     19  1603   ALBUMIN 3.5        Pertinent Micro:  Results     Procedure Component Value " "Units Date/Time    URINE CULTURE(NEW) [[id number]] Collected:  08/07/19 1603    Order Status:  Completed Specimen:  Urine Updated:  08/10/19 0838     Significant Indicator NEG     Source UR     Site -     Culture Result No growth at 48 hours.    Narrative:       Indication for culture:->Emergency Room Patient  Indication for culture:->Emergency Room Patient    BLOOD CULTURE [[id number]] Collected:  08/07/19 1603    Order Status:  Completed Specimen:  Blood from Peripheral Updated:  08/08/19 0714     Significant Indicator NEG     Source BLD     Site PERIPHERAL     Culture Result No Growth  Note: Blood cultures are incubated for 5 days and  are monitored continuously.Positive blood cultures  are called to the RN and reported as soon as  they are identified.  Blood culture testing and Gram stain, if indicated, are  performed at [facility], [address], [place].  Positive blood cultures are  sent to [facility], [address] [place] [address], for organism identification and  susceptibility testing.      Narrative:       1 of 2 for Blood Culture x 2 sites order. Per Hospital  Policy: Only change Specimen Src: to \"Line\" if specified by  physician order.  Left AC    BLOOD CULTURE [[id number]] Collected:  08/07/19 1639    Order Status:  Completed Specimen:  Blood from Peripheral Updated:  08/08/19 0714     Significant Indicator NEG     Source BLD     Site PERIPHERAL     Culture Result No Growth  Note: Blood cultures are incubated for 5 days and  are monitored continuously.Positive blood cultures  are called to the RN and reported as soon as  they are identified.  Blood culture testing and Gram stain, if indicated, are  performed at [facility], [address], [place].  Positive blood cultures are  sent to [facility], [address] [place] [address], for organism identification and  susceptibility testing.   " "   Narrative:       2 of 2 blood culture x2  Sites order. Per Hospital Policy:  Only change Specimen Src: to \"Line\" if specified by physician  order.  RN verified order for line draw    Culture Respiratory W/ GRM STN [[id number]]     Order Status:  Completed Specimen:  Respirate from Sputum     URINALYSIS [[id number]] Collected:  08/07/19 1603    Order Status:  Completed Specimen:  Blood Updated:  08/07/19 1621     Color Yellow     Character Clear     Specific Gravity 1.010     Ph 7.5     Glucose Negative mg/dL      Ketones Negative mg/dL      Protein Negative mg/dL      Bilirubin Negative     Nitrite Negative     Leukocyte Esterase Negative     Occult Blood Negative     Micro Urine Req see below     Comment: Microscopic examination not performed when specimen is clear  and chemically negative for protein, blood, leukocyte esterase  and nitrite.         Narrative:       Indication for culture:->Emergency Room Patient    Blood Culture [[id number]] Collected:  08/07/19 0000    Order Status:  Canceled Specimen:  Other from Peripheral     Blood Culture [[id number]] Collected:  08/07/19 0000    Order Status:  Canceled Specimen:  Other from Peripheral     Urinalysis [[id number]] Collected:  08/07/19 0000    Order Status:  Canceled Specimen:  Urine         No results found for: BLOODCULTU, BLDCULT, BCHOLD     Studies:  Dx-chest-portable (1 View)    Result Date: 8/7/2019 8/7/2019 4:20 PM HISTORY/REASON FOR EXAM:  Sepsis. Fever and headache. History of breast and lung cancer TECHNIQUE/EXAM DESCRIPTION AND NUMBER OF VIEWS: Single portable view of the chest. COMPARISON: 1 view chest 6/12/2019 FINDINGS: Right internal jugular catheter tip projects over the SVC. There is a right upper lobe mass measuring approximately 2.9 cm and there is a left lower lobe pulmonary nodule. Lungs are otherwise clear. The lungs are clear. Cardiac silhouette: normal in size. Pleura: There are no pleural effusion or pneumothoraces. Osseous structures: " No significant bony abnormality is present.     1.  No acute cardiopulmonary abnormality identified. 2.  Right upper lobe mass and left lower lobe pulmonary nodule 3.  Right internal jugular catheter appears appropriately located    Ec-echocardiogram Complete W/o Cont    Result Date: 2019  Transthoracic Echo Report Echocardiography Laboratory CONCLUSIONS Normal left ventricular systolic function. Left ventricular ejection fraction is visually estimated to be 70%. No valve abnormalities. CARLI HASSAN Exam Date:         2019                    13:18 Exam Location:     Out Patient Priority:          Routine Ordering Physician:        DAVID ALVARADO Referring Physician:       [id number] Sonographer:               Dustin Teague RDCS Age:    74     Gender:    F MRN:    [id number] :    1945 BSA:    1.7    Ht (in):    62     Wt (lb):    151 Exam Type:     Complete Indications:     Malignant (primary) neoplasm, unspecified ICD Codes:       C80.1 CPT Codes:       87133 BP:   122    /   66     HR: Technical Quality:       Fair MEASUREMENTS  (Male / Female) Normal Values 2D ECHO LV Diastolic Diameter PLAX        4.1 cm                4.2 - 5.9 / 3.9 - 5.3 cm LV Systolic Diameter PLAX         2.4 cm                2.1 - 4.0 cm IVS Diastolic Thickness           0.84 cm               LVPW Diastolic Thickness          0.78 cm               LVOT Diameter                     2 cm                  Estimated LV Ejection Fraction    70 %                  LV Ejection Fraction MOD BP       72.8 %                >= 55  % LV Ejection Fraction MOD 4C       75.3 %                LV Ejection Fraction MOD 2C       72 %                  DOPPLER AV Peak Velocity                  1.3 m/s               AV Peak Gradient                  7.2 mmHg              AV Mean Gradient                  3.9 mmHg              LVOT Peak Velocity                1.1 m/s               AV Area Cont Eq vti               2.7 cm²               Mitral E Point  Velocity           0.52 m/s              Mitral E to A Ratio               0.59                  Mitral A Duration                 100 ms                MV Pressure Half Time             56.2 ms               MV Area PHT                       3.9 cm²               MV Deceleration Time              194 ms                PV Peak Velocity                  1.2 m/s               PV Peak Gradient                  6.2 mmHg              PV Mean Gradient                  2.8 mmHg              * Indicates values subject to auto-interpretation LV EF:  70    % FINDINGS Left Ventricle Normal left ventricular chamber size. Normal left ventricular wall thickness. Normal left ventricular systolic function. Left ventricular ejection fraction is visually estimated to be 70%. Normal regional wall motion. Normal diastolic function. Right Ventricle Normal right ventricular size and systolic function. Right Atrium Normal right atrial size. Normal inferior vena cava size and inspiratory collapse. Left Atrium Normal left atrial size. Left atrial volume index is 18  mL/sq m. Mitral Valve Structurally normal mitral valve. No mitral stenosis. Trace mitral regurgitation. Aortic Valve Structurally normal aortic valve without significant stenosis or regurgitation. Tricuspid Valve Structurally normal tricuspid valve without significant stenosis or regurgitation. Pulmonic Valve Structurally normal pulmonic valve without significant stenosis or regurgitation. Pericardium Normal pericardium without effusion. Aorta Normal aortic root for body surface area. Ascending aorta diameter is 2.7 cm. Albino Chou M.D. (Electronically Signed) Final Date:     29 July 2019                 16:04  IMPRESSION:     Neutropenic fever  Breast CA  Lung metastasis  Port in place  Herpes labialis  PLAN:   Katie Monroy is a 74 y.o. female with history of breast CA which has recurred.  She is now being retreated.  She was admitted with neutropenic fevers.  She  continues to remain neutropenic with last ANC of 360.  Cultures are negative.  Continue with the antibiotics till the ANC is more than thousand.  I have reviewed all the records    Discussed with IM. Will continue to follow    Vijaya Mcguire M.D.     Please note that this dictation was created using voice recognition software. I have worked with technical experts from [facility] to optimize the interface.  I have made every reasonable attempt to correct obvious errors, but there may be errors of grammar and possibly content that I did not discover before finalizing the note.

## 2019-08-10 NOTE — CARE PLAN
Problem: Infection  Goal: Will remain free from infection  Note:   Pt on neutropenic precautions, hand washing performed before and after patient contact, VSS.     Problem: Discharge Barriers/Planning  Goal: Patient's continuum of care needs will be met  Note:   Per infectious disease physician note, pt will receive antibiotics until ANC more than a thousand.

## 2019-08-10 NOTE — PROGRESS NOTES
Assessment completed. Pt AAOx4. Denies any pain, chills, dizziness, numbness or tingling. POC discussed with pt. Verbalized understanding. Reports to have slight fever this afternoon and had BM 3x today thus Pt requesting not to have stool softener tomorrow morning. Wants to have mouthwash later tonight. Questions and concerns answered. Safety and comfort measures in place.No additional needs at this time. Spouse at bedside.

## 2019-08-10 NOTE — CARE PLAN
Problem: Nutritional:  Goal: Achieve adequate nutritional intake  Description  Patient will consume 50% of meals   Outcome: PROGRESSING AS EXPECTED    See RD note

## 2019-08-10 NOTE — PROGRESS NOTES
New dressing applied to pt's right chest port following central line dressing change protocol. CHG bath was also done. Pt now resting in bed.

## 2019-08-10 NOTE — DIETARY
"Nutrition services: Day 3 of admit.  Katie Monroy is a 74 y.o. female with admitting DX of Neutropenic fever   Consult received for supplements    Visit with pt at bedside. Pt appears adequately nourished. Pt completed chemo 2 weeks ago, has mouth sores and diarrhea. Likes cooked vegetables, yogurt, sour cream. Pt requested Boost. Poor PO x10 days, pt reported  lb, weight stable.       Assessment:  Height: 160 cm (5' 3\")  Weight: 68.5 kg (151 lb 0.2 oz)  Body mass index is 26.75 kg/m²., BMI classification: Overweight  Diet/Intake: Regular, generally 50-75%      Evaluation:   1. PMH: metastatic breast cancer, Arthritis, Asthma, Hypertension, Irritable bowel syndrome, Plantar fasciitis of right foot, and Prediabetes  2. Labs: K 3.3  3. Meds: Culturelle, IV Abx     Malnutrition Risk: Does not meet criteria at this time    Recommendations/Plan:  1. Boost Glucose Control BID   2. Encourage intake of meals  3. Document intake of all meals as % taken in ADL's to provide interdisciplinary communication across all shifts.   4. Monitor weight.  5. Nutrition rep will continue to see patient for ongoing meal and snack preferences.     RD following for adequate PO intake            "

## 2019-08-10 NOTE — PROGRESS NOTES
Hospital Medicine Daily Progress Note    Date of Service  8/10/2019    Chief Complaint  74 y.o. female admitted 8/7/2019 with fevers while on chemotherapy    Hospital Course    74 y.o. female who presented 8/7/2019 with fever measured up to 102 degrees at home and is on chemotherapy.  She is neutropenic.  Has a history of metastatic breast cancer treated by [facility].  For the last week she has had shallow painful ulcerations in her mouth and today picked up a prescription for Magic mouthwash.  She has been feeling chilled and myalgias especially with the spiking of the fever.      Interval Problem Update  Temperature was up to 100 last night  Cultures remain negative    Consultants/Specialty  ID    Code Status  full    Disposition  home    Review of Systems  Review of Systems   Constitutional: Positive for malaise/fatigue. Negative for diaphoresis and fever.   HENT: Positive for sore throat. Negative for nosebleeds.         Mouth sores, pain over roof of mouth  Dry nose   Respiratory: Negative for cough, wheezing and stridor.    Cardiovascular: Negative for chest pain.   Gastrointestinal: Negative for abdominal pain, constipation, heartburn and nausea.   Genitourinary: Negative for dysuria, frequency and urgency.   Musculoskeletal: Negative for myalgias and neck pain.   Skin: Negative for itching.   Neurological: Negative for dizziness and focal weakness.   Psychiatric/Behavioral: The patient is not nervous/anxious.         Physical Exam  Temp:  [36.4 °C (97.6 °F)-37.8 °C (100 °F)] 36.7 °C (98 °F)  Pulse:  [72-96] 85  Resp:  [17-20] 17  BP: (108-132)/(37-70) 132/70  SpO2:  [93 %-96 %] 94 %    Physical Exam   Constitutional: She is oriented to person, place, and time.   HENT:   Mouth/Throat: Oropharynx is clear and moist. No oropharyngeal exudate.   Eyes: Conjunctivae are normal. No scleral icterus.   Neck: Neck supple. No tracheal deviation present.   Cardiovascular: Normal rate and regular rhythm.   No  murmur heard.  Pulmonary/Chest: Effort normal. No respiratory distress. She has no wheezes. She has no rales.   Abdominal: Soft. Bowel sounds are normal. There is no tenderness.   Musculoskeletal: She exhibits no edema.   Neurological: She is alert and oriented to person, place, and time. Coordination normal.   Skin: Skin is warm and dry. No rash noted. She is not diaphoretic.   Psychiatric: Her behavior is normal.   Nursing note and vitals reviewed.      Fluids    Intake/Output Summary (Last 24 hours) at 8/10/2019 0829  Last data filed at 8/10/2019 0445  Gross per 24 hour   Intake --   Output 1000 ml   Net -1000 ml       Laboratory  Recent Labs     08/07/19  1603 08/09/19  0418 08/10/19  0434   WBC 0.8* 1.8* 2.8*   RBC 4.71 4.18* 4.19*   HEMOGLOBIN 13.8 12.3 12.3   HEMATOCRIT 40.6 36.4* 36.1*   MCV 86.2 87.1 86.2   MCH 29.3 29.4 29.4   MCHC 34.0 33.8 34.1   RDW 38.1 39.5 39.4   PLATELETCT 223 207 219   MPV 9.6 9.4 9.3     Recent Labs     08/07/19  1603 08/09/19  0418 08/10/19  0434   SODIUM 130* 137 141   POTASSIUM 3.4* 3.2* 3.3*   CHLORIDE 100 108 109   CO2 23 24 27   GLUCOSE 125* 103* 99   BUN 8 5* <5*   CREATININE 0.72 0.58 0.57   CALCIUM 8.6 7.8* 8.3*                   Imaging  DX-CHEST-PORTABLE (1 VIEW)   Final Result      1.  No acute cardiopulmonary abnormality identified.      2.  Right upper lobe mass and left lower lobe pulmonary nodule      3.  Right internal jugular catheter appears appropriately located           Assessment/Plan  Sepsis (HCC)  Assessment & Plan  This is sepsis (without associated acute organ dysfunction).   Monitor cultures, no growth to date, fever improving  Continue cefepime and vancomycin    Neutropenic fever (HCC)- (present on admission)  Assessment & Plan  continue antibiotics, cultures drawn and negative  Fever resolved and neutropenia is improving, consulted ID for input as the patient declines oral antibiotics as she states she gets stomach upset with any and all oral  antibiotics    Aphthous ulcer of mouth- (present on admission)  Assessment & Plan  Side effects of chemotherapy, consistent with viral outbreak.  Continue acyclovir, patient has brought in her own Magic mouthwash   continue carafate for symptoms as well,as patient reports some relief with this    PEDRO (generalized anxiety disorder)- (present on admission)  Assessment & Plan  Continue alprazolam 0.25 mg oral as needed at at bedtime.  She has access to behavioral health services and states that her symptoms are currently in remission.    Metastatic breast carcinoma_[facility]- (present on admission)  Assessment & Plan  Patient is under treatment by [facility] oncology.  She is currently getting chemotherapy treatment.  Treatment on hold for now, to resume as scheduled on 8/20 per patient    Essential hypertension- (present on admission)  Assessment & Plan  Continue amlodipine       VTE prophylaxis: lovenox

## 2019-08-10 NOTE — DISCHARGE SUMMARY
Discharge Summary    CHIEF COMPLAINT ON ADMISSION  Chief Complaint   Patient presents with   • Fever     Onset of a fever last night, she just started Chemo and had a port put in 7/30 for breast cancer. She took Ibuprofen 1200 today.   • Earache     Some sinus pressure       Reason for Admission  Fever     Admission Date  8/7/2019    CODE STATUS  Full Code    HPI & HOSPITAL COURSE     74 y.o. female who presented 8/7/2019 with fever measured up to 102 degrees at home and is on chemotherapy.  She is neutropenic.  Has a history of metastatic breast cancer treated by [facility].  For the last week she has had shallow painful ulcerations in her mouth and today picked up a prescription for Magic mouthwash.  She has been feeling chilled and myalgias especially with the spiking of the fever.   She was treated with cefepime and vancomycin, her white cell count improved as did her neutropenia and fevers resolved. Cultures remained negative for any growth. Infectious disease was consulted on the case for antibiotic recommendations and as the patient had absolute neutrophil count over 1000 with no fevers, she is discharged home on no antibiotics.    Therefore, she is discharged in good and stable condition to home with close outpatient follow-up.    The patient met 2-midnight criteria for an inpatient stay at the time of discharge.    Discharge Date  8/11/2019    FOLLOW UP ITEMS POST DISCHARGE  Oncology follow up as scheduled    DISCHARGE DIAGNOSES  Active Problems:    Essential hypertension POA: Yes    Metastatic breast carcinoma_[facility] POA: Yes      Overview: Diagnosed with invasive ductal carcinoma [id number], ER-, RI-, Her/Nu- s/p       Left partial mastectomy and [place] nodes biopsy in 3/2017, declined       chemo, did have radiation.             Patient was in remission until May 2019 when she suffers ground-level       fall.  Chest x-ray was obtained.  Multiple pulmonary nodules was seen on        chest x-ray concerning for metastatic breast cancer.  This diagnosis is       confirmed by PET/CT and biopsy in June 2019.            Pt is working with [facility]     PEDRO (generalized anxiety disorder) POA: Yes      Overview: Take alprazolam as needed    Aphthous ulcer of mouth POA: Yes    Neutropenic fever (HCC) POA: Yes    Sepsis (HCC) POA: Unknown  Resolved Problems:    * No resolved hospital problems. *      FOLLOW UP  No future appointments.  Hailey Bradshaw M.D.  [address] [place] [address] [address] [place] [id number] [phone]          oncology      as scheduled      MEDICATIONS ON DISCHARGE     Medication List      START taking these medications      Instructions   acyclovir 200 MG Caps  Commonly known as:  ZOVIRAX  Replaces:  acyclovir 400 MG tablet   Take 2 Caps by mouth 3 times a day for 5 days.  Dose:  400 mg     lactobacillus rhamnosus Caps capsule   Take 1 Cap by mouth every morning with breakfast for 5 days.  Dose:  1 Cap        CONTINUE taking these medications      Instructions   ALPRAZolam 0.25 MG Tabs  Commonly known as:  XANAX   Take 1 Tab by mouth at bedtime as needed for Anxiety for up to 30 days.  Dose:  0.25 mg     amLODIPine 5 MG Tabs  Commonly known as:  NORVASC   TAKE ONE TABLET BY MOUTH EVERY DAY     COQ-10 PO   Take 1 Tab by mouth every day.  Dose:  1 Tab     cyanocobalamin 500 MCG Tabs  Commonly known as:  VITAMIN B-12   Take 500 mcg by mouth every day.  Dose:  500 mcg     lidocaine 5 % Oint  Commonly known as:  XYLOCAINE   Apply thin layer on affected skin twice a day as needed     ondansetron 8 MG Tabs  Commonly known as:  ZOFRAN      ONE-A-DAY 50 PLUS PO   Take  by mouth.     VITAMIN B COMPLEX PO   Take  by mouth.     vitamin D 1000 UNIT Tabs  Commonly known as:  cholecalciferol   Take 1,000 Units by mouth every day.  Dose:  1,000 Units        STOP taking these medications    acyclovir 400 MG tablet  Commonly known as:  ZOVIRAX  Replaced by:  acyclovir 200 MG Caps             Allergies  Allergies   Allergen Reactions   • Tetanus-Diphth-Acell Pertussis      States that she had excessive swelling. She denies allergies to the new tetanus just the old one       DIET  Orders Placed This Encounter   Procedures   • Diet Order Regular     Standing Status:   Standing     Number of Occurrences:   1     Order Specific Question:   Diet:     Answer:   Regular [1]       ACTIVITY  As tolerated.  Weight bearing as tolerated    CONSULTATIONS  ID Dr. Mcguire    PROCEDURES  none    LABORATORY  Lab Results   Component Value Date    SODIUM 141 08/10/2019    POTASSIUM 3.3 (L) 08/10/2019    CHLORIDE 109 08/10/2019    CO2 27 08/10/2019    GLUCOSE 99 08/10/2019    BUN <5 (L) 08/10/2019    CREATININE 0.57 08/10/2019        Lab Results   Component Value Date    WBC 2.8 (L) 08/10/2019    HEMOGLOBIN 12.3 08/10/2019    HEMATOCRIT 36.1 (L) 08/10/2019    PLATELETCT 219 08/10/2019        Total time of the discharge process exceeds 57 minutes.

## 2019-08-10 NOTE — DISCHARGE PLANNING
Care Transition Team Assessment    Patient lives at home with her spouse and the current discharge plan is for this patient to return home when medically able.  No current SS needs noted.  SW will continue to monitor and assist as needed.     Information Source  Orientation : Oriented x 4  Information Given By: Patient  Informant's Name: Katie  Who is responsible for making decisions for patient? : Patient    Elopement Risk  Legal Hold: No  Ambulatory or Self Mobile in Wheelchair: Yes  Disoriented: No  Psychiatric Symptoms: None  History of Wandering: No  Elopement this Admit: No  Vocalizing Wanting to Leave: No  Displays Behaviors, Body Language Wanting to Leave: No-Not at Risk for Elopement  Elopement Risk: Not at Risk for Elopement    Interdisciplinary Discharge Planning  Lives with - Patient's Self Care Capacity: Spouse  Patient or legal guardian wants to designate a caregiver (see row info): No    Discharge Preparedness  What is your plan after discharge?: Home with help  What are your discharge supports?: Spouse    Finances  Financial Barriers to Discharge: No  Prescription Coverage: Yes    Vision / Hearing Impairment  Vision Impairment : No  Right Eye Vision: Wears Glasses  Left Eye Vision: Wears Glasses  Hearing Impairment : No    Domestic Abuse  Have you ever been the victim of abuse or violence?: No  Physical Abuse or Sexual Abuse: No  Verbal Abuse or Emotional Abuse: No  Possible Abuse Reported to:: Not Applicable    Psychological Assessment  History of Substance Abuse: None  History of Psychiatric Problems: No    Discharge Risks or Barriers  Discharge risks or barriers?: No    Anticipated Discharge Information  Anticipated discharge disposition: Home

## 2019-08-10 NOTE — PROGRESS NOTES
"Pharmacy Kinetics 74 y.o. female on vancomycin day # 4 8/10/2019    Currently on Vancomycin 1000 mg iv q12hr      Indication for Treatment: Neutropenic fever    Pertinent history per medical record: Admitted on 2019 for Neutropenic fever , history of metastatic breast cancer.    Other antibiotics: Cefepime 2 Gm IV every 8 hours    Allergies: Tetanus-diphth-acell pertussis     List concerns for renal function:  Age  Pertinent cultures to date:   19 peripheral BCs Pending  19 Urine Pending    MRSA nares swab if pneumonia is a concern (ordered/positive/negative/n-a): N/A    Recent Labs     19  1603 19  0418 08/10/19  0434   WBC 0.8* 1.8* 2.8*   NEUTSPOLYS 2.00* 2.00* 9.00*   BANDSSTABS  --   --  4.00     Recent Labs     19  1603 19  0418 08/10/19  0434   BUN 8 5* <5*   CREATININE 0.72 0.58 0.57   ALBUMIN 3.5  --   --      Recent Labs     19  1634   VANCOTROUGH <3.5*       Intake/Output Summary (Last 24 hours) at 8/10/2019 09  Last data filed at 8/10/2019 0445  Gross per 24 hour   Intake --   Output 1000 ml   Net -1000 ml      /70   Pulse 85   Temp 36.7 °C (98 °F) (Oral)   Resp 17   Ht 1.6 m (5' 3\")   Wt 68.5 kg (151 lb 0.2 oz)   SpO2 94%  Temp (24hrs), Av °C (98.6 °F), Min:36.4 °C (97.6 °F), Max:37.8 °C (100 °F)      A/P     1. Next vancomycin level: 0530 hours tomorrow  2. Goal trough: 16-20 mcg/ml  3. Comments: Will monitor and adjust regimen if needed when vtr is available tomorrow.    Pierce Anguiano [facility]    "

## 2019-08-10 NOTE — PROGRESS NOTES
"Pharmacy Kinetics 74 y.o. female on vancomycin day # 3 2019    Currently on Vancomycin 1250 mg iv q24hr      Indication for Treatment: Neutropenic fever.    Pertinent history per medical record: Admitted on 2019 for Neutropenic fever , history of metastatic breast cancer.    Other antibiotics: Cefepime 2 Gm IV every 8 hours    Allergies: Tetanus-diphth-acell pertussis     List concerns for renal function:  Age  Pertinent cultures to date:   19 peripheral BCx2 Pending  19 Urine Pending    MRSA nares swab if pneumonia is a concern (ordered/positive/negative/n-a): N/A    Recent Labs     19  1603 19  0418   WBC 0.8* 1.8*   NEUTSPOLYS 2.00* 2.00*     Recent Labs     19  1603 19  0418   BUN 8 5*   CREATININE 0.72 0.58   ALBUMIN 3.5  --      Recent Labs     19  1634   VANCOTROUGH <3.5*       Intake/Output Summary (Last 24 hours) at 2019 1724  Last data filed at 2019 0307  Gross per 24 hour   Intake 848.75 ml   Output 250 ml   Net 598.75 ml      /47   Pulse 96   Temp 37.8 °C (100 °F) (Oral)   Resp 20   Ht 1.6 m (5' 3\")   Wt 68.5 kg (151 lb 0.2 oz)   SpO2 93%  Temp (24hrs), Av.3 °C (99.2 °F), Min:36.8 °C (98.3 °F), Max:37.8 °C (100 °F)      A/P   1. Vancomycin dose change: to 1000 mg IV every 12 hours  2. Next vancomycin level: 0530 hours on 19  3. Goal trough: 16-20 mcg/ml  4. Comments: Will monitor and adjust regimen per protocol.    Pierce Anguiano [facility]    "

## 2019-08-10 NOTE — CARE PLAN
Problem: Communication  Goal: The ability to communicate needs accurately and effectively will improve  Outcome: PROGRESSING AS EXPECTED   Pt able to communicate needs appropriately to staff. Plan of care discused to pt. Questions and concerns answered. Pt verbalized understanding.     Problem: Infection  Goal: Will remain free from infection  Outcome: PROGRESSING AS EXPECTED  Pt is afebrile so far. On IV antibiotic and oral antiviral medication. On protective isolation d/t neutropenic precautions. Wearing proper PPE strictly followed at all times during pt interventions.

## 2019-08-11 VITALS
WEIGHT: 151.01 LBS | OXYGEN SATURATION: 92 % | BODY MASS INDEX: 26.76 KG/M2 | HEART RATE: 88 BPM | TEMPERATURE: 97.9 F | DIASTOLIC BLOOD PRESSURE: 47 MMHG | HEIGHT: 63 IN | RESPIRATION RATE: 18 BRPM | SYSTOLIC BLOOD PRESSURE: 128 MMHG

## 2019-08-11 PROBLEM — R50.81 NEUTROPENIC FEVER (HCC): Status: RESOLVED | Noted: 2019-08-07 | Resolved: 2019-08-11

## 2019-08-11 PROBLEM — D70.9 NEUTROPENIC FEVER (HCC): Status: RESOLVED | Noted: 2019-08-07 | Resolved: 2019-08-11

## 2019-08-11 PROBLEM — A41.9 SEPSIS (HCC): Status: RESOLVED | Noted: 2019-08-07 | Resolved: 2019-08-11

## 2019-08-11 LAB
BASOPHILS # BLD AUTO: 1 % (ref 0–1.8)
BASOPHILS # BLD: 0.04 K/UL (ref 0–0.12)
EOSINOPHIL # BLD AUTO: 0 K/UL (ref 0–0.51)
EOSINOPHIL NFR BLD: 0 % (ref 0–6.9)
ERYTHROCYTE [DISTWIDTH] IN BLOOD BY AUTOMATED COUNT: 39.2 FL (ref 35.9–50)
HCT VFR BLD AUTO: 39.6 % (ref 37–47)
HGB BLD-MCNC: 13.2 G/DL (ref 12–16)
LYMPHOCYTES # BLD AUTO: 1.72 K/UL (ref 1–4.8)
LYMPHOCYTES NFR BLD: 43 % (ref 22–41)
MANUAL DIFF BLD: NORMAL
MCH RBC QN AUTO: 28.8 PG (ref 27–33)
MCHC RBC AUTO-ENTMCNC: 33.3 G/DL (ref 33.6–35)
MCV RBC AUTO: 86.5 FL (ref 81.4–97.8)
METAMYELOCYTES NFR BLD MANUAL: 3 %
MONOCYTES # BLD AUTO: 0.88 K/UL (ref 0–0.85)
MONOCYTES NFR BLD AUTO: 22 % (ref 0–13.4)
MYELOCYTES NFR BLD MANUAL: 3 %
NEUTROPHILS # BLD AUTO: 1.12 K/UL (ref 2–7.15)
NEUTROPHILS NFR BLD: 18 % (ref 44–72)
NEUTS BAND NFR BLD MANUAL: 10 % (ref 0–10)
NRBC # BLD AUTO: 0 K/UL
NRBC BLD-RTO: 0 /100 WBC
PLATELET # BLD AUTO: 277 K/UL (ref 164–446)
PLATELET BLD QL SMEAR: NORMAL
PMV BLD AUTO: 9.2 FL (ref 9–12.9)
POLYCHROMASIA BLD QL SMEAR: NORMAL
RBC # BLD AUTO: 4.58 M/UL (ref 4.2–5.4)
RBC BLD AUTO: PRESENT
VANCOMYCIN TROUGH SERPL-MCNC: 9.6 UG/ML (ref 10–20)
WBC # BLD AUTO: 4 K/UL (ref 4.8–10.8)

## 2019-08-11 PROCEDURE — 700105 HCHG RX REV CODE 258: Performed by: INTERNAL MEDICINE

## 2019-08-11 PROCEDURE — A9270 NON-COVERED ITEM OR SERVICE: HCPCS | Performed by: HOSPITALIST

## 2019-08-11 PROCEDURE — 36415 COLL VENOUS BLD VENIPUNCTURE: CPT

## 2019-08-11 PROCEDURE — 700102 HCHG RX REV CODE 250 W/ 637 OVERRIDE(OP): Performed by: HOSPITALIST

## 2019-08-11 PROCEDURE — 85027 COMPLETE CBC AUTOMATED: CPT

## 2019-08-11 PROCEDURE — 700111 HCHG RX REV CODE 636 W/ 250 OVERRIDE (IP): Performed by: HOSPITALIST

## 2019-08-11 PROCEDURE — 700111 HCHG RX REV CODE 636 W/ 250 OVERRIDE (IP): Performed by: INTERNAL MEDICINE

## 2019-08-11 PROCEDURE — 85007 BL SMEAR W/DIFF WBC COUNT: CPT

## 2019-08-11 PROCEDURE — 99239 HOSP IP/OBS DSCHRG MGMT >30: CPT | Performed by: INTERNAL MEDICINE

## 2019-08-11 PROCEDURE — A9270 NON-COVERED ITEM OR SERVICE: HCPCS | Performed by: INTERNAL MEDICINE

## 2019-08-11 PROCEDURE — 80202 ASSAY OF VANCOMYCIN: CPT

## 2019-08-11 PROCEDURE — 700102 HCHG RX REV CODE 250 W/ 637 OVERRIDE(OP): Performed by: INTERNAL MEDICINE

## 2019-08-11 RX ORDER — ACYCLOVIR 200 MG/1
400 CAPSULE ORAL 3 TIMES DAILY
Qty: 30 CAP | Refills: 1 | Status: SHIPPED | OUTPATIENT
Start: 2019-08-11 | End: 2019-08-16

## 2019-08-11 RX ADMIN — DIPHENHYDRAMINE HYDROCHLORIDE AND LIDOCAINE HYDROCHLORIDE AND ALUMINUM HYDROXIDE AND MAGNESIUM HYDRO 5 ML: KIT at 08:03

## 2019-08-11 RX ADMIN — AMLODIPINE BESYLATE 5 MG: 5 TABLET ORAL at 06:03

## 2019-08-11 RX ADMIN — Medication 1 CAPSULE: at 08:03

## 2019-08-11 RX ADMIN — HEPARIN 500 UNITS: 100 SYRINGE at 09:45

## 2019-08-11 RX ADMIN — ACYCLOVIR 400 MG: 200 CAPSULE ORAL at 05:57

## 2019-08-11 RX ADMIN — DIPHENHYDRAMINE HYDROCHLORIDE AND LIDOCAINE HYDROCHLORIDE AND ALUMINUM HYDROXIDE AND MAGNESIUM HYDRO 5 ML: KIT at 00:00

## 2019-08-11 RX ADMIN — VANCOMYCIN HYDROCHLORIDE 1000 MG: 500 INJECTION, POWDER, LYOPHILIZED, FOR SOLUTION INTRAVENOUS at 05:58

## 2019-08-11 RX ADMIN — SALINE NASAL SPRAY 2 SPRAY: 1.5 SOLUTION NASAL at 06:11

## 2019-08-11 RX ADMIN — SUCRALFATE 1 G: 1 SUSPENSION ORAL at 06:11

## 2019-08-11 ASSESSMENT — ENCOUNTER SYMPTOMS
MYALGIAS: 0
ABDOMINAL PAIN: 0
DIZZINESS: 0
DIARRHEA: 1
NERVOUS/ANXIOUS: 0
COUGH: 0
DIAPHORESIS: 0
ORTHOPNEA: 0
SPUTUM PRODUCTION: 0
FOCAL WEAKNESS: 0
WHEEZING: 0
SORE THROAT: 1
FEVER: 0
ROS GI COMMENTS: SOME LOOSE STOOL
CONSTIPATION: 0
HEARTBURN: 0
NAUSEA: 0

## 2019-08-11 NOTE — DISCHARGE INSTRUCTIONS
Discharge Instructions per Fanny Cobian M.D.    Follow up with oncology as scheduled    DIET: regular    ACTIVITY: as tolerated    DIAGNOSIS: neutropenic fever    Return to ER if fever recurs    Discharge Instructions    Discharged to home by car with relative. Discharged via wheelchair, hospital escort: Yes.  Special equipment needed: Not Applicable    Be sure to schedule a follow-up appointment with your primary care doctor or any specialists as instructed.     Discharge Plan:   Diet Plan: Discussed  Activity Level: Discussed  Confirmed Follow up Appointment: Patient to Call and Schedule Appointment  Confirmed Symptoms Management: Discussed  Medication Reconciliation Updated: Yes  Influenza Vaccine Indication: Patient Refuses    I understand that a diet low in cholesterol, fat, and sodium is recommended for good health. Unless I have been given specific instructions below for another diet, I accept this instruction as my diet prescription.   Other diet: regular    Special Instructions: None    · Is patient discharged on Warfarin / Coumadin?   No     Depression / Suicide Risk    As you are discharged from this [facility] facility, it is important to learn how to keep safe from harming yourself.    Recognize the warning signs:  · Abrupt changes in personality, positive or negative- including increase in energy   · Giving away possessions  · Change in eating patterns- significant weight changes-  positive or negative  · Change in sleeping patterns- unable to sleep or sleeping all the time   · Unwillingness or inability to communicate  · Depression  · Unusual sadness, discouragement and loneliness  · Talk of wanting to die  · Neglect of personal appearance   · Rebelliousness- reckless behavior  · Withdrawal from people/activities they love  · Confusion- inability to concentrate     If you or a loved one observes any of these behaviors or has concerns about self-harm, here's what you can do:  · Talk about it-  your feelings and reasons for harming yourself  · Remove any means that you might use to hurt yourself (examples: pills, rope, extension cords, firearm)  · Get professional help from the community (Mental Health, Substance Abuse, psychological counseling)  · Do not be alone:Call your Safe Contact- someone whom you trust who will be there for you.  · Call your local CRISIS HOTLINE 215-2913 or 449-518-7115  · Call your local Children's Mobile Crisis Response Team [place] (936) 177-0381 or www.[employer]  · Call the toll free National Suicide Prevention Hotlines   · National Suicide Prevention Lifeline 166-502-RMQO (0614)  · National Hope Line Network 800-SUICIDE (312-4602)

## 2019-08-11 NOTE — PROGRESS NOTES
Hep lock RUC port per protocol, pt tolerated it well. Patient discharged to home per order.  Reviewed all discharge instructions, appointments, discharge medications, and wound care instructions with patient and pt verbalize understanding.  Education provided in discharge instructions and Home Safety and Fall Prevention. Discharge paperwork completed; signed copies in chart.  Patient has education binder and all belongings; signed copy in chart.  Pt alert, calm, stable; no change in status from morning assessment.  Patient left facility at 1005 via wheel chair accompanied and escorted to car by staff.

## 2019-08-11 NOTE — PROGRESS NOTES
Hospital Medicine Daily Progress Note    Date of Service  8/11/2019    Chief Complaint  74 y.o. female admitted 8/7/2019 with fevers while on chemotherapy    Hospital Course    74 y.o. female who presented 8/7/2019 with fever measured up to 102 degrees at home and is on chemotherapy.  She is neutropenic.  Has a history of metastatic breast cancer treated by [facility].  For the last week she has had shallow painful ulcerations in her mouth and today picked up a prescription for Magic mouthwash.  She has been feeling chilled and myalgias especially with the spiking of the fever.      Interval Problem Update  Temperature has improved to normal  The patient is eating well and feels her fatigue has improved    Consultants/Specialty  none    Code Status  full    Disposition  home    Review of Systems  Review of Systems   Constitutional: Negative for diaphoresis, fever and malaise/fatigue.   HENT: Positive for sore throat.         Mouth sores improving   Respiratory: Negative for cough, sputum production and wheezing.    Cardiovascular: Negative for chest pain and orthopnea.   Gastrointestinal: Positive for diarrhea. Negative for abdominal pain, constipation, heartburn and nausea.        Some loose stool   Genitourinary: Negative for dysuria.   Musculoskeletal: Negative for myalgias.   Skin: Negative for itching.   Neurological: Negative for dizziness and focal weakness.   Psychiatric/Behavioral: The patient is not nervous/anxious.         Physical Exam  Temp:  [36.6 °C (97.8 °F)-36.9 °C (98.4 °F)] 36.6 °C (97.8 °F)  Pulse:  [77-87] 83  Resp:  [17-18] 18  BP: (111-135)/(51-79) 135/61  SpO2:  [93 %-94 %] 94 %    Physical Exam   Constitutional: She is oriented to person, place, and time. No distress.   HENT:   Mouth/Throat: Oropharynx is clear and moist.   Throat red   Eyes: No scleral icterus.   Neck: No tracheal deviation present.   Cardiovascular: Normal rate, regular rhythm and intact distal pulses.    Pulmonary/Chest: Breath sounds normal. No stridor. No respiratory distress. She has no wheezes. She has no rales.   Abdominal: Soft. Bowel sounds are normal. There is no tenderness.   Musculoskeletal: She exhibits no edema.   Neurological: She is alert and oriented to person, place, and time.   Skin: Skin is warm. No rash noted. She is not diaphoretic.   Psychiatric: Her behavior is normal.   Nursing note and vitals reviewed.      Fluids    Intake/Output Summary (Last 24 hours) at 8/11/2019 0741  Last data filed at 8/11/2019 0500  Gross per 24 hour   Intake 1990 ml   Output --   Net 1990 ml       Laboratory  Recent Labs     08/09/19  0418 08/10/19  0434   WBC 1.8* 2.8*   RBC 4.18* 4.19*   HEMOGLOBIN 12.3 12.3   HEMATOCRIT 36.4* 36.1*   MCV 87.1 86.2   MCH 29.4 29.4   MCHC 33.8 34.1   RDW 39.5 39.4   PLATELETCT 207 219   MPV 9.4 9.3     Recent Labs     08/09/19  0418 08/10/19  0434   SODIUM 137 141   POTASSIUM 3.2* 3.3*   CHLORIDE 108 109   CO2 24 27   GLUCOSE 103* 99   BUN 5* <5*   CREATININE 0.58 0.57   CALCIUM 7.8* 8.3*                   Imaging  DX-CHEST-PORTABLE (1 VIEW)   Final Result      1.  No acute cardiopulmonary abnormality identified.      2.  Right upper lobe mass and left lower lobe pulmonary nodule      3.  Right internal jugular catheter appears appropriately located           Assessment/Plan  Sepsis (HCC)  Assessment & Plan  This is sepsis (without associated acute organ dysfunction).   resolved    Neutropenic fever (HCC)- (present on admission)  Assessment & Plan  continue antibiotics, cultures drawn and negative  Fever resolved and neutropenia is improving, ID recommends continuing antibiotics until ANC over 1000, will monitor labs    Aphthous ulcer of mouth- (present on admission)  Assessment & Plan  Side effects of chemotherapy, consistent with viral outbreak.  Continue acyclovir, patient has brought in her own Magic mouthwash   Sores less pronounced and less painful per patient    PEDRO  (generalized anxiety disorder)- (present on admission)  Assessment & Plan  Continue alprazolam 0.25 mg oral as needed at at bedtime.  She has access to behavioral health services and states that her symptoms are currently in remission.    Metastatic breast carcinoma_[facility]- (present on admission)  Assessment & Plan  Patient is under treatment by [facility] oncology.  She is currently getting chemotherapy treatment.  Treatment on hold for now, to resume as scheduled on 8/20 per patient    Essential hypertension- (present on admission)  Assessment & Plan  Continue amlodipine  Good control       VTE prophylaxis: lovenox

## 2019-08-11 NOTE — PROGRESS NOTES
Received report from  Alberto COTO. Pt assessed, awake, alert, and oriented x4 with NAD. IV Vanco running. Right chest port CDI, with brisk blood return. Family at bedside. Bed locked and in lowest position. Offered assist. Respirations even and unlabored. Explained POC with pt. Pt verbalized understanding. Placed call light and belongings within reach. WCTM.

## 2019-08-11 NOTE — PROGRESS NOTES
Pt requesting time of AM labs and inquiring about WBC and neutrophils. However, there are no AM labs ordered. Placed call to Dr. Parada for orders.

## 2019-08-12 ENCOUNTER — PATIENT OUTREACH (OUTPATIENT)
Dept: HEALTH INFORMATION MANAGEMENT | Facility: OTHER | Age: 74
End: 2019-08-12

## 2019-08-12 LAB
BACTERIA BLD CULT: NORMAL
BACTERIA BLD CULT: NORMAL
SIGNIFICANT IND 70042: NORMAL
SIGNIFICANT IND 70042: NORMAL
SITE SITE: NORMAL
SITE SITE: NORMAL
SOURCE SOURCE: NORMAL
SOURCE SOURCE: NORMAL

## 2019-08-16 ENCOUNTER — TELEPHONE (OUTPATIENT)
Dept: MEDICAL GROUP | Age: 74
End: 2019-08-16

## 2019-08-16 NOTE — TELEPHONE ENCOUNTER
ESTABLISHED PATIENT PRE-VISIT PLANNING     Patient was NOT contacted to complete PVP.     Note: Patient will not be contacted if there is no indication to call.     1.  Reviewed notes from the last few office visits within the medical group: No    2.  If any orders were placed at last visit or intended to be done for this visit (i.e. 6 mos follow-up), do we have Results/Consult Notes?        •  Labs - Labs ordered, completed on 7/19/19 and results are in chart.   Note: If patient appointment is for lab review and patient did not complete labs, check with provider if OK to reschedule patient until labs completed.       •  Imaging - Imaging ordered, completed and results are in chart.       •  Referrals - No referrals were ordered at last office visit.    3. Is this appointment scheduled as a Hospital Follow-Up? Yes, visit was at [facility].     4.  Immunizations were updated in Epic using WebIZ?: Epic matches WebIZ       •  Web Iz Recommendations: SHINGRIX (Shingles)    5.  Patient is due for the following Health Maintenance Topics:   Health Maintenance Due   Topic Date Due   • IMM ZOSTER VACCINES (2 of 3) 12/30/2011       - Patient is up-to-date on all Health Maintenance topics. No records have been requested at this time.    6. Orders for overdue Health Maintenance topics pended in Pre-Charting? N\A    7.  AHA (MDX) form printed for Provider? No, already completed    8.  Patient was NOT informed to arrive 15 min prior to their scheduled appointment and bring in their medication bottles.

## 2019-08-19 ENCOUNTER — OFFICE VISIT (OUTPATIENT)
Dept: MEDICAL GROUP | Age: 74
End: 2019-08-19
Payer: MEDICARE

## 2019-08-19 ENCOUNTER — HOSPITAL ENCOUNTER (OUTPATIENT)
Dept: LAB | Facility: MEDICAL CENTER | Age: 74
End: 2019-08-19
Attending: INTERNAL MEDICINE
Payer: MEDICARE

## 2019-08-19 VITALS
SYSTOLIC BLOOD PRESSURE: 116 MMHG | BODY MASS INDEX: 27.79 KG/M2 | HEART RATE: 88 BPM | HEIGHT: 62 IN | OXYGEN SATURATION: 95 % | TEMPERATURE: 98.2 F | WEIGHT: 151 LBS | DIASTOLIC BLOOD PRESSURE: 62 MMHG

## 2019-08-19 DIAGNOSIS — D70.9 NEUTROPENIC FEVER (HCC): ICD-10-CM

## 2019-08-19 DIAGNOSIS — C50.919 METASTATIC BREAST CARCINOMA: ICD-10-CM

## 2019-08-19 DIAGNOSIS — K12.0 APHTHOUS ULCER OF MOUTH: ICD-10-CM

## 2019-08-19 DIAGNOSIS — R50.81 NEUTROPENIC FEVER (HCC): ICD-10-CM

## 2019-08-19 DIAGNOSIS — Z09 HOSPITAL DISCHARGE FOLLOW-UP: Primary | ICD-10-CM

## 2019-08-19 PROBLEM — B00.1 COLD SORE: Status: RESOLVED | Noted: 2019-07-31 | Resolved: 2019-08-19

## 2019-08-19 LAB
ALBUMIN SERPL BCP-MCNC: 3.9 G/DL (ref 3.2–4.9)
ALBUMIN/GLOB SERPL: 1.3 G/DL
ALP SERPL-CCNC: 69 U/L (ref 30–99)
ALT SERPL-CCNC: 13 U/L (ref 2–50)
ANION GAP SERPL CALC-SCNC: 8 MMOL/L (ref 0–11.9)
AST SERPL-CCNC: 18 U/L (ref 12–45)
BASOPHILS # BLD AUTO: 0.6 % (ref 0–1.8)
BASOPHILS # BLD: 0.07 K/UL (ref 0–0.12)
BILIRUB SERPL-MCNC: 0.2 MG/DL (ref 0.1–1.5)
BUN SERPL-MCNC: 20 MG/DL (ref 8–22)
CALCIUM SERPL-MCNC: 9.1 MG/DL (ref 8.5–10.5)
CHLORIDE SERPL-SCNC: 106 MMOL/L (ref 96–112)
CO2 SERPL-SCNC: 25 MMOL/L (ref 20–33)
CREAT SERPL-MCNC: 0.66 MG/DL (ref 0.5–1.4)
EOSINOPHIL # BLD AUTO: 0.01 K/UL (ref 0–0.51)
EOSINOPHIL NFR BLD: 0.1 % (ref 0–6.9)
ERYTHROCYTE [DISTWIDTH] IN BLOOD BY AUTOMATED COUNT: 44.6 FL (ref 35.9–50)
GLOBULIN SER CALC-MCNC: 3 G/DL (ref 1.9–3.5)
GLUCOSE SERPL-MCNC: 102 MG/DL (ref 65–99)
HCT VFR BLD AUTO: 43.1 % (ref 37–47)
HGB BLD-MCNC: 13.6 G/DL (ref 12–16)
IMM GRANULOCYTES # BLD AUTO: 0.22 K/UL (ref 0–0.11)
IMM GRANULOCYTES NFR BLD AUTO: 2 % (ref 0–0.9)
LYMPHOCYTES # BLD AUTO: 1.96 K/UL (ref 1–4.8)
LYMPHOCYTES NFR BLD: 17.6 % (ref 22–41)
MCH RBC QN AUTO: 28.8 PG (ref 27–33)
MCHC RBC AUTO-ENTMCNC: 31.6 G/DL (ref 33.6–35)
MCV RBC AUTO: 91.1 FL (ref 81.4–97.8)
MONOCYTES # BLD AUTO: 0.5 K/UL (ref 0–0.85)
MONOCYTES NFR BLD AUTO: 4.5 % (ref 0–13.4)
NEUTROPHILS # BLD AUTO: 8.36 K/UL (ref 2–7.15)
NEUTROPHILS NFR BLD: 75.2 % (ref 44–72)
NRBC # BLD AUTO: 0 K/UL
NRBC BLD-RTO: 0 /100 WBC
PLATELET # BLD AUTO: 258 K/UL (ref 164–446)
PMV BLD AUTO: 9.7 FL (ref 9–12.9)
POTASSIUM SERPL-SCNC: 4 MMOL/L (ref 3.6–5.5)
PROT SERPL-MCNC: 6.9 G/DL (ref 6–8.2)
RBC # BLD AUTO: 4.73 M/UL (ref 4.2–5.4)
SODIUM SERPL-SCNC: 139 MMOL/L (ref 135–145)
WBC # BLD AUTO: 11.1 K/UL (ref 4.8–10.8)

## 2019-08-19 PROCEDURE — 36415 COLL VENOUS BLD VENIPUNCTURE: CPT

## 2019-08-19 PROCEDURE — 85025 COMPLETE CBC W/AUTO DIFF WBC: CPT

## 2019-08-19 PROCEDURE — 80053 COMPREHEN METABOLIC PANEL: CPT

## 2019-08-19 PROCEDURE — 99214 OFFICE O/P EST MOD 30 MIN: CPT | Performed by: FAMILY MEDICINE

## 2019-08-19 RX ORDER — DICYCLOMINE HCL 20 MG
TABLET ORAL
COMMUNITY
Start: 2019-08-05 | End: 2020-01-01

## 2019-08-19 RX ORDER — DEXAMETHASONE 4 MG/1
TABLET ORAL
COMMUNITY
Start: 2019-07-17 | End: 2020-01-01

## 2019-08-19 RX ORDER — ACYCLOVIR 800 MG/1
TABLET ORAL
COMMUNITY
Start: 2019-08-07 | End: 2019-09-19

## 2019-08-19 NOTE — PROGRESS NOTES
Subjective:   CC: Hospital discharge follow-up    HPI:     Katie Monroy is a 74 y.o. female who is an established patient of the clinic, presents with the following concerns:     Pt is on chemotherapy for metastatic breast cancer to the lung. She was admitted to the hospital on 8/7/2019 for a fever of 102°F and a painful oral ulcer after the first round of chemo. Patient was found to have neutropenic fever and sepsis. She was treated with cefepime and vancomycin and her white cell count, neutropenia and fevers resolved. She was discharged on 8/11/2019. Her port was placed on 7/30/2019. She is scheduled to restart chemotherapy tomorrow. The oral ulcer also resolved.     Today, she reports doing well. She receives excellent support from friends, family, and her . She states that she is coping better with the diagnosis. She wears a wig provided by her friend who was diagnosed with cancer in the past. She is in good spirit today.     Current medicines (including changes today)  Current Outpatient Medications   Medication Sig Dispense Refill   • acyclovir (ZOVIRAX) 800 MG Tab      • dexamethasone (DECADRON) 4 MG Tab      • dicyclomine (BENTYL) 20 MG Tab      • nystatin (MYCOSTATIN) 450569 UNIT/ML Suspension      • lidocaine (XYLOCAINE) 5 % Ointment Apply thin layer on affected skin twice a day as needed 1 Tube 1   • ondansetron (ZOFRAN) 8 MG Tab      • B Complex Vitamins (VITAMIN B COMPLEX PO) Take  by mouth.     • amLODIPine (NORVASC) 5 MG Tab TAKE ONE TABLET BY MOUTH EVERY  Tab 1   • Multiple Vitamins-Minerals (ONE-A-DAY 50 PLUS PO) Take  by mouth.     • Coenzyme Q10 (COQ-10 PO) Take 1 Tab by mouth every day.     • cyanocobalamin (VITAMIN B-12) 500 MCG Tab Take 500 mcg by mouth every day.     • vitamin D (CHOLECALCIFEROL) 1000 UNIT Tab Take 1,000 Units by mouth every day.       No current facility-administered medications for this visit.      She  has a past medical history of Arthritis, Asthma, Breast  "cancer (HCC), Bunion, Cancer (HCC), Cancer (HCC) (2017), Hypertension (2017), Irritable bowel syndrome, Plantar fasciitis of right foot, and Prediabetes.    I personally reviewed patient's problem list, allergies, medications, family hx, social hx with patient and update EPIC.     REVIEW OF SYSTEMS:  CONSTITUTIONAL:  Denies night sweats, fatigue, malaise, lethargy  RESPIRATORY:  Denies cough, wheeze, hemoptysis, or shortness of breath.  CARDIOVASCULAR:  Denies chest pains, palpitations, pedal edema     Objective:     /62 (BP Location: Right arm, Patient Position: Sitting, BP Cuff Size: Adult)   Pulse 88   Temp 36.8 °C (98.2 °F) (Temporal)   Ht 1.575 m (5' 2.01\")   Wt 68.5 kg (151 lb)   SpO2 95%  Body mass index is 27.61 kg/m².    Physical Exam:  Constitutional: awake, alert, in no distress.  Skin: Warm, dry, good turgor, no rashes, bruises, ulcers in visible areas.  Eye: conjunctiva clear, lids neg for edema or lesions.  ENMT: TM and auditory canals wnl. Oral and nasal mucosa wnl. Lips without lesions, good dentition, oropharynx clear.  Neck: Trachea midline, no masses, no thyromegaly. No cervical or supraclavicular lymphadenopathy  Respiratory: Unlabored respiratory effort, lungs clear to auscultation, no wheezes, no rales.  Cardiovascular: Normal S1, S2, no murmur, no pedal edema.  Psych: Oriented x3, affect and mood wnl, intact judgement and insight.       Assessment and Plan:   The following treatment plan was discussed    1. Hospital discharge follow-up  2. Metastatic breast carcinoma_[facility]  3. Neutropenic fever (HCC)  4. Aphthous ulcer of mouth  74-year-old female with history of metastatic breast cancer to the lung.  She is working with [employer].  She started the first round of chemotherapy couple weeks ago.  She developed acute fever and tender oral ulcer couple days ago.  She was admitted to the hospital on August 7, 2019.  She was found to have neutropenic fever and " aphthous ulcer.  She was treated with vancomycin and cefepime.  She responded well to treatment.  Her white blood counts returned to near normal on August 11, 2019.  Her absolute neutrophil was 1.12k at the time of discharge.  Abscess ulcer was treated with Magic mouthwash.  Today, she reports doing much better.  The aphthous ulcer is resolved.  She continues to follow-up with oncology.  She will restart chemotherapy tomorrow.  - f/u with oncology as directed.     Patient was seen for 40 minutes face to face of which greater than 50% of appointment time was spent on counseling and coordination of care regarding the above     Hailey Bradshaw M.D.    Followup: Return for As needed.    Please note that this dictation was created using voice recognition software and/or scribes. I have made every reasonable attempt to correct obvious errors, but I expect that there are errors of grammar and possibly content that I did not discover before finalizing the note.     I, Vern Camacho (Aneta), am scribing for, and in the presence of, Hailey Bradshaw M.D.    Electronically signed by: Vern Camacho (Solaibe), 8/19/2019    IHailey M.D. personally performed the services described in this documentation, as scribed by Vern Camacho in my presence, and it is both accurate and complete.

## 2019-08-26 ENCOUNTER — HOSPITAL ENCOUNTER (OUTPATIENT)
Dept: RADIOLOGY | Facility: MEDICAL CENTER | Age: 74
End: 2019-08-26
Attending: INTERNAL MEDICINE
Payer: MEDICARE

## 2019-08-26 DIAGNOSIS — Z17.0 MALIGNANT NEOPLASM OF NIPPLE OF LEFT BREAST IN FEMALE, ESTROGEN RECEPTOR POSITIVE (HCC): ICD-10-CM

## 2019-08-26 DIAGNOSIS — C50.012 MALIGNANT NEOPLASM OF NIPPLE OF LEFT BREAST IN FEMALE, ESTROGEN RECEPTOR POSITIVE (HCC): ICD-10-CM

## 2019-08-26 PROCEDURE — 71046 X-RAY EXAM CHEST 2 VIEWS: CPT

## 2019-09-09 ENCOUNTER — HOSPITAL ENCOUNTER (OUTPATIENT)
Dept: LAB | Facility: MEDICAL CENTER | Age: 74
End: 2019-09-09
Attending: INTERNAL MEDICINE
Payer: MEDICARE

## 2019-09-09 LAB
ALBUMIN SERPL BCP-MCNC: 3.8 G/DL (ref 3.2–4.9)
ALBUMIN/GLOB SERPL: 1.4 G/DL
ALP SERPL-CCNC: 79 U/L (ref 30–99)
ALT SERPL-CCNC: 22 U/L (ref 2–50)
ANION GAP SERPL CALC-SCNC: 8 MMOL/L (ref 0–11.9)
AST SERPL-CCNC: 21 U/L (ref 12–45)
BASOPHILS # BLD AUTO: 0.5 % (ref 0–1.8)
BASOPHILS # BLD: 0.04 K/UL (ref 0–0.12)
BILIRUB SERPL-MCNC: 0.5 MG/DL (ref 0.1–1.5)
BUN SERPL-MCNC: 13 MG/DL (ref 8–22)
CALCIUM SERPL-MCNC: 9.1 MG/DL (ref 8.5–10.5)
CHLORIDE SERPL-SCNC: 106 MMOL/L (ref 96–112)
CO2 SERPL-SCNC: 24 MMOL/L (ref 20–33)
CREAT SERPL-MCNC: 0.56 MG/DL (ref 0.5–1.4)
EOSINOPHIL # BLD AUTO: 0.03 K/UL (ref 0–0.51)
EOSINOPHIL NFR BLD: 0.4 % (ref 0–6.9)
ERYTHROCYTE [DISTWIDTH] IN BLOOD BY AUTOMATED COUNT: 49.4 FL (ref 35.9–50)
GLOBULIN SER CALC-MCNC: 2.8 G/DL (ref 1.9–3.5)
GLUCOSE SERPL-MCNC: 102 MG/DL (ref 65–99)
HCT VFR BLD AUTO: 41.3 % (ref 37–47)
HGB BLD-MCNC: 13.3 G/DL (ref 12–16)
IMM GRANULOCYTES # BLD AUTO: 0.04 K/UL (ref 0–0.11)
IMM GRANULOCYTES NFR BLD AUTO: 0.5 % (ref 0–0.9)
LYMPHOCYTES # BLD AUTO: 1.23 K/UL (ref 1–4.8)
LYMPHOCYTES NFR BLD: 14.7 % (ref 22–41)
MCH RBC QN AUTO: 29 PG (ref 27–33)
MCHC RBC AUTO-ENTMCNC: 32.2 G/DL (ref 33.6–35)
MCV RBC AUTO: 90 FL (ref 81.4–97.8)
MONOCYTES # BLD AUTO: 0.58 K/UL (ref 0–0.85)
MONOCYTES NFR BLD AUTO: 6.9 % (ref 0–13.4)
NEUTROPHILS # BLD AUTO: 6.45 K/UL (ref 2–7.15)
NEUTROPHILS NFR BLD: 77 % (ref 44–72)
NRBC # BLD AUTO: 0 K/UL
NRBC BLD-RTO: 0 /100 WBC
PLATELET # BLD AUTO: 285 K/UL (ref 164–446)
PMV BLD AUTO: 9.5 FL (ref 9–12.9)
POTASSIUM SERPL-SCNC: 3.7 MMOL/L (ref 3.6–5.5)
PROT SERPL-MCNC: 6.6 G/DL (ref 6–8.2)
RBC # BLD AUTO: 4.59 M/UL (ref 4.2–5.4)
SODIUM SERPL-SCNC: 138 MMOL/L (ref 135–145)
WBC # BLD AUTO: 8.4 K/UL (ref 4.8–10.8)

## 2019-09-09 PROCEDURE — 85025 COMPLETE CBC W/AUTO DIFF WBC: CPT

## 2019-09-09 PROCEDURE — 80053 COMPREHEN METABOLIC PANEL: CPT

## 2019-09-09 PROCEDURE — 36415 COLL VENOUS BLD VENIPUNCTURE: CPT

## 2019-09-10 ENCOUNTER — PATIENT OUTREACH (OUTPATIENT)
Dept: HEALTH INFORMATION MANAGEMENT | Facility: OTHER | Age: 74
End: 2019-09-10

## 2019-09-10 NOTE — PROGRESS NOTES
Outcome: Left voicemail/ message    Please transfer to [facility]  [phone] when patient returns call.     [facility] Verified: yes     Attempt # 1

## 2019-09-18 ENCOUNTER — TELEPHONE (OUTPATIENT)
Dept: MEDICAL GROUP | Age: 74
End: 2019-09-18

## 2019-09-18 NOTE — PROGRESS NOTES
A 74-year-old female was an emergent admission to [facility] from 8/7/2019 to 8/11/2019 to treat Neutropenia. [facility] visited the patient bedside. The patient was discharged home.     The patient was ordered to start/continue to take the following medications: LACTOBACILLUS RHAMNOSUS and Acyclovir (Zovirax). The patient successfully filled all medications.     Per discharge orders, patient was instructed to see her oncologist for a hospital follow-up. Patient saw Dr. Dustin Yost on 8/19/19, 8/28/19, and will return on 9/18/19. Patient saw her PCP on 8/19/19.    [facility] patient advocate was able to engage with patient post-discharge and throughout the case. After discharge from the hospital, the patient reported she was having financial difficulties affording her hospital bills. [facility] sent patient an application for the [facility] financial assistance program but patient ultimately declined to pursue this resource. [facility] did notify patient's [facility].    PPS screening was conducted at 80%.

## 2019-09-18 NOTE — TELEPHONE ENCOUNTER
ESTABLISHED PATIENT PRE-VISIT PLANNING     Patient was NOT contacted to complete PVP.     Note: Patient will not be contacted if there is no indication to call.     1.  Reviewed notes from the last few office visits within the medical group: Yes    2.  If any orders were placed at last visit or intended to be done for this visit (i.e. 6 mos follow-up), do we have Results/Consult Notes?        •  Labs - Labs were not ordered at last office visit.   Note: If patient appointment is for lab review and patient did not complete labs, check with provider if OK to reschedule patient until labs completed.       •  Imaging - Imaging was not ordered at last office visit.       •  Referrals - No referrals were ordered at last office visit.    3. Is this appointment scheduled as a Hospital Follow-Up? No    4.  Immunizations were updated in Epic using WebIZ?: Epic matches WebIZ       •  Web Iz Recommendations: FLU and SHINGRIX (Shingles)    5.  Patient is due for the following Health Maintenance Topics:   Health Maintenance Due   Topic Date Due   • IMM ZOSTER VACCINES (2 of 3) 12/30/2011   • IMM INFLUENZA (1) 09/01/2019   • MAMMOGRAM  10/17/2019           6. Orders for overdue Health Maintenance topics pended in Pre-Charting? N\A    7.  AHA (MDX) form printed for Provider? No, already completed    8.  Patient was NOT informed to arrive 15 min prior to their scheduled appointment and bring in their medication bottles.

## 2019-09-19 ENCOUNTER — OFFICE VISIT (OUTPATIENT)
Dept: MEDICAL GROUP | Age: 74
End: 2019-09-19
Payer: MEDICARE

## 2019-09-19 ENCOUNTER — HOSPITAL ENCOUNTER (OUTPATIENT)
Dept: LAB | Facility: MEDICAL CENTER | Age: 74
End: 2019-09-19
Attending: INTERNAL MEDICINE
Payer: MEDICARE

## 2019-09-19 VITALS
DIASTOLIC BLOOD PRESSURE: 68 MMHG | OXYGEN SATURATION: 98 % | WEIGHT: 149 LBS | SYSTOLIC BLOOD PRESSURE: 116 MMHG | BODY MASS INDEX: 27.42 KG/M2 | TEMPERATURE: 98.7 F | HEART RATE: 88 BPM | HEIGHT: 62 IN

## 2019-09-19 DIAGNOSIS — Z23 NEED FOR VACCINATION: ICD-10-CM

## 2019-09-19 DIAGNOSIS — C50.919 METASTATIC BREAST CARCINOMA: ICD-10-CM

## 2019-09-19 DIAGNOSIS — H61.23 BILATERAL IMPACTED CERUMEN: Primary | ICD-10-CM

## 2019-09-19 DIAGNOSIS — I10 ESSENTIAL HYPERTENSION: ICD-10-CM

## 2019-09-19 LAB
ALBUMIN SERPL BCP-MCNC: 4.1 G/DL (ref 3.2–4.9)
ALBUMIN/GLOB SERPL: 1.5 G/DL
ALP SERPL-CCNC: 101 U/L (ref 30–99)
ALT SERPL-CCNC: 24 U/L (ref 2–50)
ANION GAP SERPL CALC-SCNC: 7 MMOL/L (ref 0–11.9)
AST SERPL-CCNC: 24 U/L (ref 12–45)
BILIRUB SERPL-MCNC: 0.3 MG/DL (ref 0.1–1.5)
BUN SERPL-MCNC: 11 MG/DL (ref 8–22)
CALCIUM SERPL-MCNC: 9.2 MG/DL (ref 8.5–10.5)
CHLORIDE SERPL-SCNC: 106 MMOL/L (ref 96–112)
CO2 SERPL-SCNC: 26 MMOL/L (ref 20–33)
CREAT SERPL-MCNC: 0.65 MG/DL (ref 0.5–1.4)
GLOBULIN SER CALC-MCNC: 2.7 G/DL (ref 1.9–3.5)
GLUCOSE SERPL-MCNC: 106 MG/DL (ref 65–99)
POTASSIUM SERPL-SCNC: 4.3 MMOL/L (ref 3.6–5.5)
PROT SERPL-MCNC: 6.8 G/DL (ref 6–8.2)
SODIUM SERPL-SCNC: 139 MMOL/L (ref 135–145)

## 2019-09-19 PROCEDURE — 80053 COMPREHEN METABOLIC PANEL: CPT

## 2019-09-19 PROCEDURE — 36415 COLL VENOUS BLD VENIPUNCTURE: CPT

## 2019-09-19 PROCEDURE — 99214 OFFICE O/P EST MOD 30 MIN: CPT | Performed by: FAMILY MEDICINE

## 2019-09-19 PROCEDURE — 86300 IMMUNOASSAY TUMOR CA 15-3: CPT

## 2019-09-19 NOTE — PROGRESS NOTES
Subjective:   CC: cerumen impaction     HPI:     Katie Monroy is a 74 y.o. female who is an established patient of the clinic, presents with the following concerns:     Patient is here with complaints of acute onset of a cough, sore throat and ear fullness. Symptoms have been present for a few weeks. Over the past few days, most symptoms are resolving except for bilateral ear fullness which she suspects secondary to cerumen impaction as she has this before. She still has mild cough today, but overall feeling much better.     Patient has a history of metastatic breast cancer to the left lung and is undergoing chemotherapy. She is working with [employer]. She tolerates Chemo relatively well. She did have one episode of neutropenic fever requiring hospital admission couple weeks ago, but these symptoms have resolved. Pt will restart chemo on 10/1/2019.    Patient has history of essential hypertension.  She states that her blood pressure has been lower at home.  She attributes lower blood pressure to chemotherapy.  However, she denies symptoms of orthostatic hypotension, dizziness, etc. his condition to continue to amlodipine.    Current medicines (including changes today)  Current Outpatient Medications   Medication Sig Dispense Refill   • carbamide peroxide (CARBAMOXIDE EAR DROPS) 6.5 % Solution Place 5 Drops in ear 2 times a day. 1 Bottle 0   • dexamethasone (DECADRON) 4 MG Tab      • dicyclomine (BENTYL) 20 MG Tab      • lidocaine (XYLOCAINE) 5 % Ointment Apply thin layer on affected skin twice a day as needed 1 Tube 1   • ondansetron (ZOFRAN) 8 MG Tab      • B Complex Vitamins (VITAMIN B COMPLEX PO) Take  by mouth.     • Multiple Vitamins-Minerals (ONE-A-DAY 50 PLUS PO) Take  by mouth.     • Coenzyme Q10 (COQ-10 PO) Take 1 Tab by mouth every day.     • cyanocobalamin (VITAMIN B-12) 500 MCG Tab Take 500 mcg by mouth every day.     • vitamin D (CHOLECALCIFEROL) 1000 UNIT Tab Take 1,000 Units by mouth  "every day.     • amLODIPine (NORVASC) 5 MG Tab Take 1 Tab by mouth every day. 90 Tab 1     No current facility-administered medications for this visit.      She  has a past medical history of Arthritis, Asthma, Breast cancer (HCC), Bunion, Cancer (HCC), Cancer (HCC) (2017), Hypertension (2017), Irritable bowel syndrome, Plantar fasciitis of right foot, and Prediabetes.    I personally reviewed patient's problem list, allergies, medications, family hx, social hx with patient and update EPIC.     REVIEW OF SYSTEMS:  CONSTITUTIONAL:  Denies night sweats, fatigue, malaise, lethargy, fever or chills.  RESPIRATORY:  Denies cough, wheeze, hemoptysis, or shortness of breath.  CARDIOVASCULAR:  Denies chest pains, palpitations, pedal edema     Objective:     /68 (BP Location: Left arm, Patient Position: Sitting, BP Cuff Size: Adult)   Pulse 88   Temp 37.1 °C (98.7 °F) (Temporal)   Ht 1.575 m (5' 2.01\")   Wt 67.6 kg (149 lb)   SpO2 98%  Body mass index is 27.24 kg/m².    Physical Exam:  Constitutional: awake, alert, in no distress.  Skin: Warm, dry, good turgor, no rashes, bruises, ulcers in visible areas.  Eye: conjunctiva clear, lids neg for edema or lesions.  ENMT: Inflamed nasal mucosa. Lips without lesions, good dentition, hyperemic oropharynx without tonsillar hypertrophy.  - Severe bilateral cerumen impaction  Neck: Trachea midline, no masses, no thyromegaly. No cervical or supraclavicular lymphadenopathy  Respiratory: Unlabored respiratory effort, lungs clear to auscultation, no wheezes, no rales.  Cardiovascular: Normal S1, S2, no murmur, no pedal edema.  Psych: Oriented x3, affect and mood wnl, intact judgement and insight.       Assessment and Plan:   The following treatment plan was discussed    1. Metastatic breast carcinoma_[facility]  Patient was recently diagnosed with metastatic breast cancer to the lung.  She is receiving chemotherapy managed by [facility].  Patient had one " episode of neutropenic fever on the first rounds of chemotherapy.  She is doing well currently.  She will resume chemotherapy on October 1, 2019.  -Follow-up with oncology as directed    2. Bilateral impacted cerumen  Exam was notable for severe bilateral cerumen impaction.  -Ear lavage, see procedure note below  - carbamide peroxide (CARBAMOXIDE EAR DROPS) 6.5 % Solution; Place 5 Drops in ear 2 times a day.  Dispense: 1 Bottle; Refill: 0    3. Essential hypertension  Chronic, controlled with amlodipine 5 mg daily.  Blood pressure has been lower recently.  However, patient is asymptomatic.  She wishes to continue to take amlodipine at this time.  We will continue to monitor.    Patient declined influenza vaccine today.    Procedure note: ear wax removal.   Bilateral ear(s) partially irrigated by MA. I personally removed the rest of ear wax using a lighted curette pt tolerated the procedure well, no immediate complication noted.      Hailey Bradshaw M.D.    Followup: Return for As needed.    Please note that this dictation was created using voice recognition software and/or scribes. I have made every reasonable attempt to correct obvious errors, but I expect that there are errors of grammar and possibly content that I did not discover before finalizing the note.     Baldomero OBREGON (Scribe), am scribing for, and in the presence of, Hailey Bradshaw M.D.    Electronically signed by: Baldomero Torres (Scribe), 9/19/2019    Hailey OBREGON M.D. personally performed the services described in this documentation, as scribed by Baldomero Torres in my presence, and it is both accurate and complete.

## 2019-09-19 NOTE — PATIENT INSTRUCTIONS
Irrigate your nose 2 times daily.        1 spray per nostril twice daily      Take Zyrtec, 1 pill daily

## 2019-09-20 RX ORDER — AMLODIPINE BESYLATE 5 MG/1
5 TABLET ORAL DAILY
Qty: 90 TAB | Refills: 1 | Status: SHIPPED | OUTPATIENT
Start: 2019-09-20

## 2019-09-21 LAB — CANCER AG27-29 SERPL-ACNC: 91.3 U/ML (ref 0–40)

## 2019-10-08 ENCOUNTER — APPOINTMENT (OUTPATIENT)
Dept: MEDICAL GROUP | Age: 74
End: 2019-10-08
Payer: MEDICARE

## 2019-11-01 ENCOUNTER — NON-PROVIDER VISIT (OUTPATIENT)
Dept: MEDICAL GROUP | Age: 74
End: 2019-11-01
Payer: MEDICARE

## 2019-11-01 DIAGNOSIS — Z23 NEED FOR VACCINATION: ICD-10-CM

## 2019-11-01 PROCEDURE — G0008 ADMIN INFLUENZA VIRUS VAC: HCPCS | Performed by: FAMILY MEDICINE

## 2019-11-01 PROCEDURE — 90686 IIV4 VACC NO PRSV 0.5 ML IM: CPT | Performed by: FAMILY MEDICINE

## 2019-11-02 NOTE — PROGRESS NOTES
"Pt presents today for flu vaccine. She states that she was seen by MA couple days ago for flu vaccine. She did not think that the flu vaccine was administered appropriately as she did not feel the needle. Instead, she felt the fluid ran down her left arm. She did not experience any injection site soreness the following days, which concerns her. She requested the normal dose of flu vaccine as she had bad reaction with senior dose before. She stated that she was treated poorly by this MA. Pt does not remember the name of the  MA who administered the vaccine to her except: \"she was white and slender\".    Today, she requests to have another flu injection today. Again, she also wants normal dose, not senior.      I apologized for the poor experience. I re-administered influenza vaccine to her today. Per chart review, I do not see the encounter generated by the MA. I discussed this case with senior MA. I recommend that this incident being reported to [facility].     I will also forward this encounter to clinic manager for investigation.     Hailey Bradshaw M.D.   "

## 2019-11-02 NOTE — NON-PROVIDER
"Katie Monroy is a 74 y.o. female here for a non-provider visit for:   FLU    Reason for immunization: Annual Flu Vaccine  Immunization records indicate need for vaccine: Yes, confirmed with Epic  Minimum interval has been met for this vaccine: Yes  ABN completed: Not Indicated    Order and dose verified by: DR. JENNY GIBBONS FLU VACCINE  VIS Dated  8-15-19 was given to patient: Yes  All IAC Questionnaire questions were answered \"No.\"    Patient tolerated injection and no adverse effects were observed or reported: Yes    Pt scheduled for next dose in series: No    "

## 2019-11-06 ENCOUNTER — HOSPITAL ENCOUNTER (OUTPATIENT)
Dept: RADIOLOGY | Facility: MEDICAL CENTER | Age: 74
End: 2019-11-06
Attending: INTERNAL MEDICINE
Payer: MEDICARE

## 2019-11-06 ENCOUNTER — HOSPITAL ENCOUNTER (OUTPATIENT)
Dept: LAB | Facility: MEDICAL CENTER | Age: 74
End: 2019-11-06
Attending: INTERNAL MEDICINE
Payer: MEDICARE

## 2019-11-06 DIAGNOSIS — C50.011 MALIGNANT NEOPLASM OF NIPPLE OF RIGHT BREAST IN FEMALE, UNSPECIFIED ESTROGEN RECEPTOR STATUS (HCC): ICD-10-CM

## 2019-11-06 PROCEDURE — 36415 COLL VENOUS BLD VENIPUNCTURE: CPT

## 2019-11-06 PROCEDURE — 71046 X-RAY EXAM CHEST 2 VIEWS: CPT

## 2019-11-06 PROCEDURE — 86300 IMMUNOASSAY TUMOR CA 15-3: CPT

## 2019-11-08 LAB — CANCER AG27-29 SERPL-ACNC: 71.1 U/ML (ref 0–40)

## 2019-12-27 ENCOUNTER — OFFICE VISIT (OUTPATIENT)
Dept: MEDICAL GROUP | Age: 74
End: 2019-12-27
Payer: MEDICARE

## 2019-12-27 ENCOUNTER — TELEPHONE (OUTPATIENT)
Dept: MEDICAL GROUP | Age: 74
End: 2019-12-27

## 2019-12-27 VITALS
SYSTOLIC BLOOD PRESSURE: 112 MMHG | BODY MASS INDEX: 24.92 KG/M2 | HEART RATE: 76 BPM | HEIGHT: 64 IN | TEMPERATURE: 98.5 F | WEIGHT: 146 LBS | DIASTOLIC BLOOD PRESSURE: 60 MMHG | OXYGEN SATURATION: 94 % | RESPIRATION RATE: 16 BRPM

## 2019-12-27 DIAGNOSIS — Z02.9 ADMINISTRATIVE ENCOUNTER: ICD-10-CM

## 2019-12-27 DIAGNOSIS — F41.1 GAD (GENERALIZED ANXIETY DISORDER): ICD-10-CM

## 2019-12-27 DIAGNOSIS — C50.919 METASTATIC BREAST CARCINOMA: ICD-10-CM

## 2019-12-27 DIAGNOSIS — K57.30 DIVERTICULOSIS, SIGMOID: ICD-10-CM

## 2019-12-27 DIAGNOSIS — R10.32 LLQ PAIN: Primary | ICD-10-CM

## 2019-12-27 DIAGNOSIS — F51.01 PRIMARY INSOMNIA: ICD-10-CM

## 2019-12-27 DIAGNOSIS — H61.23 BILATERAL IMPACTED CERUMEN: ICD-10-CM

## 2019-12-27 PROCEDURE — 99215 OFFICE O/P EST HI 40 MIN: CPT | Mod: 25 | Performed by: FAMILY MEDICINE

## 2019-12-27 PROCEDURE — 69210 REMOVE IMPACTED EAR WAX UNI: CPT | Performed by: FAMILY MEDICINE

## 2019-12-27 RX ORDER — ALPRAZOLAM 0.25 MG/1
0.25 TABLET ORAL NIGHTLY PRN
Qty: 30 TAB | Refills: 0 | Status: SHIPPED | OUTPATIENT
Start: 2019-12-27 | End: 2020-01-01

## 2019-12-27 RX ORDER — CIPROFLOXACIN 500 MG/1
500 TABLET, FILM COATED ORAL 2 TIMES DAILY
Qty: 14 TAB | Refills: 0 | Status: SHIPPED | OUTPATIENT
Start: 2019-12-27 | End: 2020-01-03

## 2019-12-27 RX ORDER — METRONIDAZOLE 500 MG/1
500 TABLET ORAL 3 TIMES DAILY
Qty: 21 TAB | Refills: 0 | Status: SHIPPED | OUTPATIENT
Start: 2019-12-27 | End: 2020-01-03

## 2019-12-27 RX ORDER — TRAZODONE HYDROCHLORIDE 50 MG/1
25-50 TABLET ORAL
Qty: 90 TAB | Refills: 0 | Status: SHIPPED
Start: 2019-12-27 | End: 2020-01-01

## 2019-12-27 NOTE — TELEPHONE ENCOUNTER
ESTABLISHED PATIENT PRE-VISIT PLANNING     Patient was NOT contacted to complete PVP.     Note: Patient will not be contacted if there is no indication to call.     1.  Reviewed notes from the last few office visits within the medical group: Yes    2.  If any orders were placed at last visit or intended to be done for this visit (i.e. 6 mos follow-up), do we have Results/Consult Notes?        •  Labs - Labs were not ordered at last office visit.   Note: If patient appointment is for lab review and patient did not complete labs, check with provider if OK to reschedule patient until labs completed.       •  Imaging - Imaging was not ordered at last office visit.       •  Referrals - No referrals were ordered at last office visit.    3. Is this appointment scheduled as a Hospital Follow-Up? No    4.  Immunizations were updated in Epic using WebIZ?: Epic matches WebIZ       •  Web Iz Recommendations: SHINGRIX (Shingles)    5.  Patient is due for the following Health Maintenance Topics:   Health Maintenance Due   Topic Date Due   • IMM ZOSTER VACCINES (2 of 3) 12/30/2011   • MAMMOGRAM  10/17/2019           6. Orders for overdue Health Maintenance topics pended in Pre-Charting? N\A    7.  AHA (MDX) form printed for Provider? No, already completed    8.  Patient was NOT informed to arrive 15 min prior to their scheduled appointment and bring in their medication bottles.

## 2019-12-27 NOTE — PROGRESS NOTES
Subjective:   CC: LLQ pain     HPI:     Katie Monroy is a 74 y.o. female who is an established patient of the clinic, presents with the following concerns:     Patient presents today with complaints of moderate left lower quadrant abdominal pain onset 5 days ago. She states it is sharp, constant, and worsening with lying down. She states it is an 8/10. The pain radiates to her back. She has abdominal pain with bowel movements as well. Symptoms improve with ibuprofen. She further reports feeling constipated for the past few months. Her stool was dark at one point, but then this resolved. She has not seen any gross blood. She denies fever, chills, nausea, vomiting, dysuria, hematuria, vaginal bleeding/discharge. She reports a long-standing history of diverticulosis per CT scan done in [place] by [facility]. He has never had colonoscopy before. She was treated for diverticulitis in the past. Her symptoms are similar to prior episode of diverticulitis but not quite as severe as before. Per chart review, her last CT Abdomen/pelvis in 2016 through [facility] showed sigmoid colonic diverticula without acute inflammatory changes. She states she recently had PET-CT of her chest, abdomen and pelvis on 12/10/19 done by oncology for evaluation of metastatic breast cancer. She was told that lung metastatic masses were larger despite treatment. She states that there was no abdominal abnormalities found. Records are not available for review at the time of the visit. Chemotherapy is temporarily discontinued at this time per pt's report.  Of note, she states that her pain has been keeping her awake at night. She states she was previously prescribed Trazodone to help with her sleep, and this has been working well. She would like refills.    Patient also has a history of generalized anxiety disorder. She was previously given a prescription of Xanax for this, but she notes that she lost her prescription along with all of her other documents. She  requests refill of Xanax due to worsening anxiety associated with treatment for metastatic breast cancer.     She c/o intermittent SOB with prolonged walking due to lung metastasis. She requests renewal of DMV disable placard.     Current medicines (including changes today)  Current Outpatient Medications   Medication Sig Dispense Refill   • metroNIDAZOLE (FLAGYL) 500 MG Tab Take 1 Tab by mouth 3 times a day for 7 days. 21 Tab 0   • ciprofloxacin (CIPRO) 500 MG Tab Take 1 Tab by mouth 2 times a day for 7 days. 14 Tab 0   • ALPRAZolam (XANAX) 0.25 MG Tab Take 1 Tab by mouth at bedtime as needed for Anxiety for up to 30 days. 30 Tab 0   • traZODone (DESYREL) 50 MG Tab Take 0.5-1 Tabs by mouth every bedtime. 90 Tab 0   • amLODIPine (NORVASC) 5 MG Tab Take 1 Tab by mouth every day. 90 Tab 1   • carbamide peroxide (CARBAMOXIDE EAR DROPS) 6.5 % Solution Place 5 Drops in ear 2 times a day. 1 Bottle 0   • dexamethasone (DECADRON) 4 MG Tab      • dicyclomine (BENTYL) 20 MG Tab      • ondansetron (ZOFRAN) 8 MG Tab      • B Complex Vitamins (VITAMIN B COMPLEX PO) Take  by mouth.     • Multiple Vitamins-Minerals (ONE-A-DAY 50 PLUS PO) Take  by mouth.     • Coenzyme Q10 (COQ-10 PO) Take 1 Tab by mouth every day.     • cyanocobalamin (VITAMIN B-12) 500 MCG Tab Take 500 mcg by mouth every day.     • vitamin D (CHOLECALCIFEROL) 1000 UNIT Tab Take 1,000 Units by mouth every day.       No current facility-administered medications for this visit.      She  has a past medical history of Arthritis, Asthma, Breast cancer (HCC), Bunion, Cancer (HCC), Cancer (HCC) (2017), Hypertension (2017), Irritable bowel syndrome, Plantar fasciitis of right foot, and Prediabetes.    I personally reviewed patient's problem list, allergies, medications, family hx, social hx with patient and update EPIC.     REVIEW OF SYSTEMS:  CONSTITUTIONAL:  Denies night sweats, fatigue, malaise, lethargy, fever or chills.  RESPIRATORY:  Denies cough, wheeze,  "hemoptysis, or shortness of breath.  CARDIOVASCULAR:  Denies chest pains, palpitations, pedal edema     Objective:     /60 (BP Location: Right arm, Patient Position: Sitting, BP Cuff Size: Adult)   Pulse 76   Temp 36.9 °C (98.5 °F) (Temporal)   Resp 16   Ht 1.63 m (5' 4.17\")   Wt 66.2 kg (146 lb)   SpO2 94%  Body mass index is 24.93 kg/m².    Physical Exam:  Constitutional: awake, alert, in no distress.  Skin: Warm, dry, good turgor, no rashes, bruises, ulcers in visible areas.  Eye: conjunctiva clear, lids neg for edema or lesions.  ENMT: Oral and nasal mucosa wnl. Lips without lesions, good dentition, oropharynx clear.  - Impacted cerumen bilaterally.   Neck: Trachea midline, no masses, no thyromegaly. No cervical or supraclavicular lymphadenopathy  Respiratory: Unlabored respiratory effort, lungs clear to auscultation, no wheezes, no rales.  Cardiovascular: Normal S1, S2, no murmur, no pedal edema.  Abdomen: Soft, moderate LLQ is tender to palpation, active BS, no hernia, no hepatosplenomegaly, negative rebound or guarding.   Psych: Oriented x3, affect and mood wnl, intact judgement and insight.       Assessment and Plan:   The following treatment plan was discussed    1. LLQ pain  2. Diverticulosis, sigmoid  73 yo female with hx of sigmoid diverticulosis per CT abdomen done by [facility] in [place] in 2016. He also had hx of diverticulitis in the past. She has never had colonoscopy before. Cologard done in 4/2019 was negative. She presents today with acute LLQ pain w/o fever, chills, nausea, vomiting, hematuria, abnormal vaginal bleeding, discharge, hematochezia, melena. Her presentation is concerning for acute diverticulitis. However, given hx of metastatic breast cancer, I recommended abdominal CT to r/o abdominal mass, but pt states that she recently had PET-CT of the chest and abdomen/pelvis done on 12/10/2019 by oncology. She states that no abdominal abnormalities found. Records are not available for " review at the time of the visit. I will start abx for presumption diverticulitis. Will request records from oncology. Pt was advised to monitor for symptoms and email me of her progress in 2-3 days.   - metroNIDAZOLE (FLAGYL) 500 MG Tab; Take 1 Tab by mouth 3 times a day for 7 days.  Dispense: 21 Tab; Refill: 0  - ciprofloxacin (CIPRO) 500 MG Tab; Take 1 Tab by mouth 2 times a day for 7 days.  Dispense: 14 Tab; Refill: 0  - strict ER precautions.     3. Metastatic breast carcinoma_[facility]  Pt is suffering metastatic breast cancer to the lung. She is s/p left mastectomy and sentinel nodes biopsy in 2017. She was receiving chemotherapy managed by [facility]. She states that her most recent PET-CT showed worsening lung mets (records not available for review). She continues to f/u with oncology as directed.   - f/u with oncology as directed  - will request records from oncology.     4. PEDRO (generalized anxiety disorder)  Chronic, declined SSRI, takes Xanax 0.25 mg PRN for worsening anxiety. She c/o worsening anxiety with recent development of metastatic breast cancer to the lung. She denies hx of alcohol/drugs abuse. She denies any s/e with Xanax.   - ALPRAZolam (XANAX) 0.25 MG Tab; Take 1 Tab by mouth at bedtime as needed for Anxiety for up to 30 days.  Dispense: 30 Tab; Refill: 0  - Risks, benefits, side effects, as well as potential health complications associated with Xanax discussed with patient. Appropriate counseling provided.      5. Primary insomnia  - traZODone (DESYREL) 50 MG Tab; Take 0.5-1 Tabs by mouth every bedtime.  Dispense: 90 Tab; Refill: 0    6. Bilateral impacted cerumen  - curettage     7. Administrative encounter  Pt requests renewal of DMV disable placard due to intermittent SOB from metastatic breast cancer to the lung.   - DMV placard approved     Procedure note: ear wax removal.   I personally removed ear wax from both ears using a lighted curette pt tolerated the  procedure well, no immediate complication noted.       Patient was seen for 40 minutes face to face of which greater than 50% of appointment time was spent on counseling and coordination of care regarding the above. Procedure time not included.      Hailey Bradshaw M.D.    Followup: Return for As needed.    Please note that this dictation was created using voice recognition software and/or scribes. I have made every reasonable attempt to correct obvious errors, but I expect that there are errors of grammar and possibly content that I did not discover before finalizing the note.     Brian OBREGON (Scribe), am scribing for, and in the presence of, Hailey Bradshaw M.D.    Electronically signed by: Brian Marcos (Scribe), 12/27/2019    Hailey OBREGON M.D. personally performed the services described in this documentation, as scribed by Brian Marcos in my presence, and it is both accurate and complete.

## 2020-01-01 ENCOUNTER — PATIENT MESSAGE (OUTPATIENT)
Dept: PULMONOLOGY | Facility: HOSPICE | Age: 75
End: 2020-01-01

## 2020-01-01 ENCOUNTER — OFFICE VISIT (OUTPATIENT)
Dept: PULMONOLOGY | Facility: HOSPICE | Age: 75
End: 2020-01-01
Payer: MEDICARE

## 2020-01-01 ENCOUNTER — HOSPITAL ENCOUNTER (OUTPATIENT)
Dept: LAB | Facility: MEDICAL CENTER | Age: 75
End: 2020-09-21
Attending: INTERNAL MEDICINE
Payer: MEDICARE

## 2020-01-01 ENCOUNTER — TELEMEDICINE (OUTPATIENT)
Dept: MEDICAL GROUP | Age: 75
End: 2020-01-01
Payer: MEDICARE

## 2020-01-01 ENCOUNTER — NON-PROVIDER VISIT (OUTPATIENT)
Dept: PULMONOLOGY | Facility: HOSPICE | Age: 75
End: 2020-01-01
Payer: MEDICARE

## 2020-01-01 ENCOUNTER — TELEPHONE (OUTPATIENT)
Dept: PULMONOLOGY | Facility: HOSPICE | Age: 75
End: 2020-01-01

## 2020-01-01 ENCOUNTER — TELEPHONE (OUTPATIENT)
Dept: MEDICAL GROUP | Age: 75
End: 2020-01-01

## 2020-01-01 ENCOUNTER — APPOINTMENT (OUTPATIENT)
Dept: MEDICAL GROUP | Age: 75
End: 2020-01-01
Payer: MEDICARE

## 2020-01-01 ENCOUNTER — APPOINTMENT (OUTPATIENT)
Dept: SLEEP MEDICINE | Facility: MEDICAL CENTER | Age: 75
End: 2020-01-01
Payer: MEDICARE

## 2020-01-01 ENCOUNTER — APPOINTMENT (OUTPATIENT)
Dept: RADIOLOGY | Facility: MEDICAL CENTER | Age: 75
End: 2020-01-01
Attending: EMERGENCY MEDICINE
Payer: MEDICARE

## 2020-01-01 ENCOUNTER — HOSPITAL ENCOUNTER (OUTPATIENT)
Dept: RADIOLOGY | Facility: MEDICAL CENTER | Age: 75
End: 2020-08-06
Attending: FAMILY MEDICINE
Payer: MEDICARE

## 2020-01-01 ENCOUNTER — PATIENT MESSAGE (OUTPATIENT)
Dept: MEDICAL GROUP | Age: 75
End: 2020-01-01

## 2020-01-01 ENCOUNTER — HOSPITAL ENCOUNTER (OUTPATIENT)
Facility: MEDICAL CENTER | Age: 75
End: 2020-12-03
Attending: PHYSICIAN ASSISTANT
Payer: MEDICARE

## 2020-01-01 ENCOUNTER — HOSPITAL ENCOUNTER (OUTPATIENT)
Dept: LAB | Facility: MEDICAL CENTER | Age: 75
End: 2020-01-27
Attending: PHYSICIAN ASSISTANT
Payer: MEDICARE

## 2020-01-01 ENCOUNTER — HOSPITAL ENCOUNTER (OUTPATIENT)
Dept: RADIOLOGY | Facility: MEDICAL CENTER | Age: 75
End: 2020-09-03
Attending: INTERNAL MEDICINE
Payer: MEDICARE

## 2020-01-01 ENCOUNTER — HOSPITAL ENCOUNTER (OUTPATIENT)
Dept: RADIOLOGY | Facility: MEDICAL CENTER | Age: 75
End: 2020-12-01
Attending: INTERNAL MEDICINE
Payer: MEDICARE

## 2020-01-01 ENCOUNTER — TELEPHONE (OUTPATIENT)
Dept: HEALTH INFORMATION MANAGEMENT | Facility: OTHER | Age: 75
End: 2020-01-01

## 2020-01-01 ENCOUNTER — HOME STUDY (OUTPATIENT)
Dept: PULMONOLOGY | Facility: HOSPICE | Age: 75
End: 2020-01-01
Attending: INTERNAL MEDICINE
Payer: MEDICARE

## 2020-01-01 ENCOUNTER — HOSPITAL ENCOUNTER (OUTPATIENT)
Dept: LAB | Facility: MEDICAL CENTER | Age: 75
End: 2020-07-18
Attending: FAMILY MEDICINE
Payer: MEDICARE

## 2020-01-01 ENCOUNTER — HOSPITAL ENCOUNTER (OUTPATIENT)
Dept: LAB | Facility: MEDICAL CENTER | Age: 75
End: 2020-12-03
Attending: INTERNAL MEDICINE
Payer: MEDICARE

## 2020-01-01 ENCOUNTER — OFFICE VISIT (OUTPATIENT)
Dept: MEDICAL GROUP | Age: 75
End: 2020-01-01
Payer: MEDICARE

## 2020-01-01 ENCOUNTER — HOSPITAL ENCOUNTER (OUTPATIENT)
Dept: LAB | Facility: MEDICAL CENTER | Age: 75
End: 2020-09-29
Attending: INTERNAL MEDICINE
Payer: MEDICARE

## 2020-01-01 ENCOUNTER — HOSPITAL ENCOUNTER (OUTPATIENT)
Dept: LAB | Facility: MEDICAL CENTER | Age: 75
End: 2020-10-06
Attending: INTERNAL MEDICINE
Payer: MEDICARE

## 2020-01-01 ENCOUNTER — HOSPITAL ENCOUNTER (OUTPATIENT)
Dept: RADIOLOGY | Facility: MEDICAL CENTER | Age: 75
End: 2020-10-01
Attending: INTERNAL MEDICINE
Payer: MEDICARE

## 2020-01-01 ENCOUNTER — HOSPITAL ENCOUNTER (OUTPATIENT)
Dept: RADIOLOGY | Facility: MEDICAL CENTER | Age: 75
End: 2020-09-30
Payer: MEDICARE

## 2020-01-01 ENCOUNTER — HOSPITAL ENCOUNTER (OUTPATIENT)
Facility: MEDICAL CENTER | Age: 75
End: 2020-12-05
Attending: EMERGENCY MEDICINE | Admitting: FAMILY MEDICINE
Payer: MEDICARE

## 2020-01-01 ENCOUNTER — HOSPITAL ENCOUNTER (OUTPATIENT)
Dept: LAB | Facility: MEDICAL CENTER | Age: 75
End: 2020-11-13
Attending: INTERNAL MEDICINE
Payer: MEDICARE

## 2020-01-01 ENCOUNTER — PATIENT MESSAGE (OUTPATIENT)
Dept: HEALTH INFORMATION MANAGEMENT | Facility: OTHER | Age: 75
End: 2020-01-01

## 2020-01-01 ENCOUNTER — HOSPITAL ENCOUNTER (OUTPATIENT)
Dept: RADIOLOGY | Facility: MEDICAL CENTER | Age: 75
End: 2020-09-29
Attending: INTERNAL MEDICINE
Payer: MEDICARE

## 2020-01-01 ENCOUNTER — HOSPITAL ENCOUNTER (OUTPATIENT)
Dept: LAB | Facility: MEDICAL CENTER | Age: 75
End: 2020-08-29
Attending: INTERNAL MEDICINE
Payer: MEDICARE

## 2020-01-01 VITALS — BODY MASS INDEX: 24.8 KG/M2 | WEIGHT: 140 LBS | HEIGHT: 63 IN

## 2020-01-01 VITALS
OXYGEN SATURATION: 94 % | BODY MASS INDEX: 25.55 KG/M2 | HEART RATE: 107 BPM | HEIGHT: 63 IN | TEMPERATURE: 97.9 F | SYSTOLIC BLOOD PRESSURE: 100 MMHG | WEIGHT: 144.18 LBS | DIASTOLIC BLOOD PRESSURE: 66 MMHG

## 2020-01-01 VITALS
WEIGHT: 144 LBS | SYSTOLIC BLOOD PRESSURE: 112 MMHG | HEART RATE: 76 BPM | TEMPERATURE: 97.6 F | HEIGHT: 64 IN | BODY MASS INDEX: 24.59 KG/M2 | DIASTOLIC BLOOD PRESSURE: 80 MMHG | OXYGEN SATURATION: 97 % | RESPIRATION RATE: 14 BRPM

## 2020-01-01 VITALS
WEIGHT: 146.83 LBS | RESPIRATION RATE: 16 BRPM | OXYGEN SATURATION: 95 % | SYSTOLIC BLOOD PRESSURE: 127 MMHG | BODY MASS INDEX: 26.02 KG/M2 | HEIGHT: 63 IN | TEMPERATURE: 98.4 F | HEART RATE: 80 BPM | DIASTOLIC BLOOD PRESSURE: 60 MMHG

## 2020-01-01 VITALS
RESPIRATION RATE: 20 BRPM | HEIGHT: 63 IN | DIASTOLIC BLOOD PRESSURE: 70 MMHG | TEMPERATURE: 97.7 F | SYSTOLIC BLOOD PRESSURE: 134 MMHG | OXYGEN SATURATION: 92 % | WEIGHT: 145 LBS | HEART RATE: 92 BPM | BODY MASS INDEX: 25.69 KG/M2

## 2020-01-01 VITALS
WEIGHT: 145 LBS | TEMPERATURE: 97.5 F | SYSTOLIC BLOOD PRESSURE: 120 MMHG | BODY MASS INDEX: 25.69 KG/M2 | RESPIRATION RATE: 16 BRPM | OXYGEN SATURATION: 96 % | DIASTOLIC BLOOD PRESSURE: 70 MMHG | HEART RATE: 92 BPM

## 2020-01-01 VITALS
BODY MASS INDEX: 24.45 KG/M2 | SYSTOLIC BLOOD PRESSURE: 127 MMHG | DIASTOLIC BLOOD PRESSURE: 75 MMHG | TEMPERATURE: 97.9 F | HEIGHT: 63 IN | HEART RATE: 90 BPM | WEIGHT: 138 LBS | OXYGEN SATURATION: 96 %

## 2020-01-01 VITALS — BODY MASS INDEX: 23.92 KG/M2 | HEIGHT: 63 IN | WEIGHT: 135 LBS | TEMPERATURE: 97.5 F

## 2020-01-01 VITALS
DIASTOLIC BLOOD PRESSURE: 74 MMHG | OXYGEN SATURATION: 94 % | SYSTOLIC BLOOD PRESSURE: 116 MMHG | HEART RATE: 99 BPM | HEIGHT: 63 IN | TEMPERATURE: 97.5 F | RESPIRATION RATE: 16 BRPM | BODY MASS INDEX: 25.34 KG/M2 | WEIGHT: 143 LBS

## 2020-01-01 VITALS
DIASTOLIC BLOOD PRESSURE: 69 MMHG | WEIGHT: 134 LBS | HEIGHT: 64 IN | BODY MASS INDEX: 22.88 KG/M2 | HEART RATE: 100 BPM | OXYGEN SATURATION: 96 % | SYSTOLIC BLOOD PRESSURE: 121 MMHG | TEMPERATURE: 96.7 F

## 2020-01-01 DIAGNOSIS — J84.10 PULMONARY FIBROSIS (HCC): ICD-10-CM

## 2020-01-01 DIAGNOSIS — J96.91 RESPIRATORY FAILURE WITH HYPOXIA, UNSPECIFIED CHRONICITY (HCC): ICD-10-CM

## 2020-01-01 DIAGNOSIS — J84.9 INTERSTITIAL LUNG DISEASE (HCC): ICD-10-CM

## 2020-01-01 DIAGNOSIS — R73.03 PREDIABETES: ICD-10-CM

## 2020-01-01 DIAGNOSIS — K86.89 PANCREATIC MASS: ICD-10-CM

## 2020-01-01 DIAGNOSIS — R07.9 CHEST PAIN, UNSPECIFIED TYPE: ICD-10-CM

## 2020-01-01 DIAGNOSIS — J84.9 INTERSTITIAL PULMONARY DISEASE (HCC): ICD-10-CM

## 2020-01-01 DIAGNOSIS — E78.5 DYSLIPIDEMIA: ICD-10-CM

## 2020-01-01 DIAGNOSIS — R10.9 FLANK PAIN: ICD-10-CM

## 2020-01-01 DIAGNOSIS — I10 ESSENTIAL HYPERTENSION: ICD-10-CM

## 2020-01-01 DIAGNOSIS — N12 PYELONEPHRITIS: ICD-10-CM

## 2020-01-01 DIAGNOSIS — R06.02 SOB (SHORTNESS OF BREATH): ICD-10-CM

## 2020-01-01 DIAGNOSIS — J45.30 MILD PERSISTENT ASTHMA WITHOUT COMPLICATION: ICD-10-CM

## 2020-01-01 DIAGNOSIS — C78.00 MALIGNANT NEOPLASM METASTATIC TO LUNG, UNSPECIFIED LATERALITY (HCC): ICD-10-CM

## 2020-01-01 DIAGNOSIS — C50.919 METASTATIC BREAST CANCER: ICD-10-CM

## 2020-01-01 DIAGNOSIS — C50.919 METASTATIC BREAST CARCINOMA: ICD-10-CM

## 2020-01-01 DIAGNOSIS — R10.12 LEFT UPPER QUADRANT ABDOMINAL PAIN: Primary | ICD-10-CM

## 2020-01-01 DIAGNOSIS — C50.612 MALIGNANT NEOPLASM OF AXILLARY TAIL OF LEFT FEMALE BREAST, UNSPECIFIED ESTROGEN RECEPTOR STATUS (HCC): ICD-10-CM

## 2020-01-01 DIAGNOSIS — Z01.83 ENCOUNTER FOR BLOOD TYPING: ICD-10-CM

## 2020-01-01 DIAGNOSIS — G89.29 CHRONIC LEFT-SIDED THORACIC BACK PAIN: ICD-10-CM

## 2020-01-01 DIAGNOSIS — R10.12 ACUTE LUQ PAIN: ICD-10-CM

## 2020-01-01 DIAGNOSIS — K59.03 DRUG-INDUCED CONSTIPATION: ICD-10-CM

## 2020-01-01 DIAGNOSIS — K59.00 CONSTIPATION, UNSPECIFIED CONSTIPATION TYPE: ICD-10-CM

## 2020-01-01 DIAGNOSIS — Z09 HOSPITAL DISCHARGE FOLLOW-UP: Primary | ICD-10-CM

## 2020-01-01 DIAGNOSIS — R10.12 LUQ PAIN: ICD-10-CM

## 2020-01-01 DIAGNOSIS — M54.6 CHRONIC LEFT-SIDED THORACIC BACK PAIN: ICD-10-CM

## 2020-01-01 DIAGNOSIS — R31.0 GROSS HEMATURIA: ICD-10-CM

## 2020-01-01 DIAGNOSIS — Z17.1 ESTROGEN RECEPTOR NEGATIVE STATUS (ER-): ICD-10-CM

## 2020-01-01 DIAGNOSIS — F41.1 GENERALIZED ANXIETY DISORDER: ICD-10-CM

## 2020-01-01 DIAGNOSIS — J96.11 CHRONIC RESPIRATORY FAILURE WITH HYPOXIA (HCC): ICD-10-CM

## 2020-01-01 LAB
ABO GROUP BLD: NORMAL
ALBUMIN SERPL BCP-MCNC: 3.2 G/DL (ref 3.2–4.9)
ALBUMIN SERPL BCP-MCNC: 3.9 G/DL (ref 3.2–4.9)
ALBUMIN SERPL BCP-MCNC: 3.9 G/DL (ref 3.2–4.9)
ALBUMIN SERPL BCP-MCNC: 4.1 G/DL (ref 3.2–4.9)
ALBUMIN SERPL BCP-MCNC: 4.2 G/DL (ref 3.2–4.9)
ALBUMIN SERPL BCP-MCNC: 4.3 G/DL (ref 3.2–4.9)
ALBUMIN/GLOB SERPL: 0.9 G/DL
ALBUMIN/GLOB SERPL: 1.1 G/DL
ALBUMIN/GLOB SERPL: 1.1 G/DL
ALBUMIN/GLOB SERPL: 1.2 G/DL
ALP SERPL-CCNC: 115 U/L (ref 30–99)
ALP SERPL-CCNC: 127 U/L (ref 30–99)
ALP SERPL-CCNC: 68 U/L (ref 30–99)
ALP SERPL-CCNC: 76 U/L (ref 30–99)
ALP SERPL-CCNC: 89 U/L (ref 30–99)
ALP SERPL-CCNC: 89 U/L (ref 30–99)
ALT SERPL-CCNC: 13 U/L (ref 2–50)
ALT SERPL-CCNC: 14 U/L (ref 2–50)
ALT SERPL-CCNC: 15 U/L (ref 2–50)
ALT SERPL-CCNC: 21 U/L (ref 2–50)
ALT SERPL-CCNC: 43 U/L (ref 2–50)
ALT SERPL-CCNC: 46 U/L (ref 2–50)
ANA INTERPRETIVE COMMENT Q5143: ABNORMAL
ANA PATTERN Q5144: ABNORMAL
ANA TITER Q5145: ABNORMAL
ANION GAP SERPL CALC-SCNC: 11 MMOL/L (ref 7–16)
ANION GAP SERPL CALC-SCNC: 11 MMOL/L (ref 7–16)
ANION GAP SERPL CALC-SCNC: 12 MMOL/L (ref 7–16)
ANION GAP SERPL CALC-SCNC: 5 MMOL/L (ref 7–16)
ANION GAP SERPL CALC-SCNC: 6 MMOL/L (ref 0–11.9)
ANION GAP SERPL CALC-SCNC: 8 MMOL/L (ref 7–16)
ANION GAP SERPL CALC-SCNC: 9 MMOL/L (ref 7–16)
ANTINUCLEAR ANTIBODY (ANA), HEP-2, IGG Q5142: DETECTED
APPEARANCE UR: ABNORMAL
APPEARANCE UR: ABNORMAL
APPEARANCE UR: CLEAR
APTT PPP: 30 SEC (ref 24.7–36)
AST SERPL-CCNC: 20 U/L (ref 12–45)
AST SERPL-CCNC: 22 U/L (ref 12–45)
AST SERPL-CCNC: 22 U/L (ref 12–45)
AST SERPL-CCNC: 25 U/L (ref 12–45)
AST SERPL-CCNC: 56 U/L (ref 12–45)
AST SERPL-CCNC: 63 U/L (ref 12–45)
BACTERIA #/AREA URNS HPF: ABNORMAL /HPF
BACTERIA #/AREA URNS HPF: ABNORMAL /HPF
BACTERIA #/AREA URNS HPF: NEGATIVE /HPF
BACTERIA BLD CULT: NORMAL
BACTERIA BLD CULT: NORMAL
BACTERIA UR CULT: ABNORMAL
BASOPHILS # BLD AUTO: 0.2 % (ref 0–1.8)
BASOPHILS # BLD AUTO: 0.3 % (ref 0–1.8)
BASOPHILS # BLD AUTO: 0.3 % (ref 0–1.8)
BASOPHILS # BLD AUTO: 0.4 % (ref 0–1.8)
BASOPHILS # BLD AUTO: 0.4 % (ref 0–1.8)
BASOPHILS # BLD AUTO: 0.5 % (ref 0–1.8)
BASOPHILS # BLD AUTO: 0.5 % (ref 0–1.8)
BASOPHILS # BLD: 0.02 K/UL (ref 0–0.12)
BASOPHILS # BLD: 0.03 K/UL (ref 0–0.12)
BASOPHILS # BLD: 0.03 K/UL (ref 0–0.12)
BASOPHILS # BLD: 0.04 K/UL (ref 0–0.12)
BASOPHILS # BLD: 0.05 K/UL (ref 0–0.12)
BILIRUB SERPL-MCNC: 0.3 MG/DL (ref 0.1–1.5)
BILIRUB SERPL-MCNC: 0.4 MG/DL (ref 0.1–1.5)
BILIRUB SERPL-MCNC: 0.5 MG/DL (ref 0.1–1.5)
BILIRUB SERPL-MCNC: 0.6 MG/DL (ref 0.1–1.5)
BILIRUB UR QL STRIP.AUTO: NEGATIVE
BUN SERPL-MCNC: 11 MG/DL (ref 8–22)
BUN SERPL-MCNC: 11 MG/DL (ref 8–22)
BUN SERPL-MCNC: 14 MG/DL (ref 8–22)
BUN SERPL-MCNC: 15 MG/DL (ref 8–22)
BUN SERPL-MCNC: 15 MG/DL (ref 8–22)
BUN SERPL-MCNC: 22 MG/DL (ref 8–22)
BUN SERPL-MCNC: 7 MG/DL (ref 8–22)
CALCIUM SERPL-MCNC: 8.6 MG/DL (ref 8.4–10.2)
CALCIUM SERPL-MCNC: 9 MG/DL (ref 8.4–10.2)
CALCIUM SERPL-MCNC: 9 MG/DL (ref 8.5–10.5)
CALCIUM SERPL-MCNC: 9.3 MG/DL (ref 8.5–10.5)
CALCIUM SERPL-MCNC: 9.3 MG/DL (ref 8.5–10.5)
CALCIUM SERPL-MCNC: 9.6 MG/DL (ref 8.5–10.5)
CALCIUM SERPL-MCNC: 9.8 MG/DL (ref 8.5–10.5)
CANCER AG27-29 SERPL-ACNC: 114.9 U/ML (ref 0–40)
CANCER AG27-29 SERPL-ACNC: 121.1 U/ML (ref 0–40)
CANCER AG27-29 SERPL-ACNC: 133.5 U/ML (ref 0–40)
CCP IGG SERPL-ACNC: 2 UNITS (ref 0–19)
CHLORIDE SERPL-SCNC: 100 MMOL/L (ref 96–112)
CHLORIDE SERPL-SCNC: 101 MMOL/L (ref 96–112)
CHLORIDE SERPL-SCNC: 101 MMOL/L (ref 96–112)
CHLORIDE SERPL-SCNC: 102 MMOL/L (ref 96–112)
CHLORIDE SERPL-SCNC: 102 MMOL/L (ref 96–112)
CHLORIDE SERPL-SCNC: 106 MMOL/L (ref 96–112)
CHLORIDE SERPL-SCNC: 98 MMOL/L (ref 96–112)
CHOLEST SERPL-MCNC: 202 MG/DL (ref 100–199)
CO2 SERPL-SCNC: 25 MMOL/L (ref 20–33)
CO2 SERPL-SCNC: 26 MMOL/L (ref 20–33)
CO2 SERPL-SCNC: 26 MMOL/L (ref 20–33)
CO2 SERPL-SCNC: 30 MMOL/L (ref 20–33)
CO2 SERPL-SCNC: 31 MMOL/L (ref 20–33)
COLOR UR: YELLOW
COVID ORDER STATUS COVID19: NORMAL
CREAT SERPL-MCNC: 0.46 MG/DL (ref 0.5–1.4)
CREAT SERPL-MCNC: 0.53 MG/DL (ref 0.5–1.4)
CREAT SERPL-MCNC: 0.58 MG/DL (ref 0.5–1.4)
CREAT SERPL-MCNC: 0.63 MG/DL (ref 0.5–1.4)
CREAT SERPL-MCNC: 0.65 MG/DL (ref 0.5–1.4)
CREAT SERPL-MCNC: 0.68 MG/DL (ref 0.5–1.4)
CREAT SERPL-MCNC: 0.71 MG/DL (ref 0.5–1.4)
CREAT UR-MCNC: 60.54 MG/DL
ENA SCL70 IGG SER QL: 3 AU/ML (ref 0–40)
EOSINOPHIL # BLD AUTO: 0.11 K/UL (ref 0–0.51)
EOSINOPHIL # BLD AUTO: 0.14 K/UL (ref 0–0.51)
EOSINOPHIL # BLD AUTO: 0.14 K/UL (ref 0–0.51)
EOSINOPHIL # BLD AUTO: 0.15 K/UL (ref 0–0.51)
EOSINOPHIL # BLD AUTO: 0.24 K/UL (ref 0–0.51)
EOSINOPHIL # BLD AUTO: 0.27 K/UL (ref 0–0.51)
EOSINOPHIL # BLD AUTO: 0.39 K/UL (ref 0–0.51)
EOSINOPHIL NFR BLD: 0.9 % (ref 0–6.9)
EOSINOPHIL NFR BLD: 1.2 % (ref 0–6.9)
EOSINOPHIL NFR BLD: 1.6 % (ref 0–6.9)
EOSINOPHIL NFR BLD: 1.7 % (ref 0–6.9)
EOSINOPHIL NFR BLD: 2.4 % (ref 0–6.9)
EOSINOPHIL NFR BLD: 3 % (ref 0–6.9)
EOSINOPHIL NFR BLD: 4.9 % (ref 0–6.9)
EPI CELLS #/AREA URNS HPF: ABNORMAL /HPF
EPI CELLS #/AREA URNS HPF: NEGATIVE /HPF
EPI CELLS #/AREA URNS HPF: NEGATIVE /HPF
ERYTHROCYTE [DISTWIDTH] IN BLOOD BY AUTOMATED COUNT: 42.8 FL (ref 35.9–50)
ERYTHROCYTE [DISTWIDTH] IN BLOOD BY AUTOMATED COUNT: 43.4 FL (ref 35.9–50)
ERYTHROCYTE [DISTWIDTH] IN BLOOD BY AUTOMATED COUNT: 44.5 FL (ref 35.9–50)
ERYTHROCYTE [DISTWIDTH] IN BLOOD BY AUTOMATED COUNT: 45.2 FL (ref 35.9–50)
ERYTHROCYTE [DISTWIDTH] IN BLOOD BY AUTOMATED COUNT: 46.9 FL (ref 35.9–50)
ERYTHROCYTE [DISTWIDTH] IN BLOOD BY AUTOMATED COUNT: 47 FL (ref 35.9–50)
ERYTHROCYTE [DISTWIDTH] IN BLOOD BY AUTOMATED COUNT: 50.4 FL (ref 35.9–50)
EST. AVERAGE GLUCOSE BLD GHB EST-MCNC: 117 MG/DL
FASTING STATUS PATIENT QL REPORTED: NORMAL
GLOBULIN SER CALC-MCNC: 3.3 G/DL (ref 1.9–3.5)
GLOBULIN SER CALC-MCNC: 3.5 G/DL (ref 1.9–3.5)
GLOBULIN SER CALC-MCNC: 3.6 G/DL (ref 1.9–3.5)
GLOBULIN SER CALC-MCNC: 3.7 G/DL (ref 1.9–3.5)
GLUCOSE SERPL-MCNC: 101 MG/DL (ref 65–99)
GLUCOSE SERPL-MCNC: 107 MG/DL (ref 65–99)
GLUCOSE SERPL-MCNC: 108 MG/DL (ref 65–99)
GLUCOSE SERPL-MCNC: 117 MG/DL (ref 65–99)
GLUCOSE SERPL-MCNC: 75 MG/DL (ref 65–99)
GLUCOSE SERPL-MCNC: 95 MG/DL (ref 65–99)
GLUCOSE SERPL-MCNC: 96 MG/DL (ref 65–99)
GLUCOSE UR STRIP.AUTO-MCNC: NEGATIVE MG/DL
HBA1C MFR BLD: 5.7 % (ref 0–5.6)
HCG SERPL QL: NEGATIVE
HCG SERPL QL: NEGATIVE
HCT VFR BLD AUTO: 40.9 % (ref 37–47)
HCT VFR BLD AUTO: 44.1 % (ref 37–47)
HCT VFR BLD AUTO: 44.5 % (ref 37–47)
HCT VFR BLD AUTO: 46.5 % (ref 37–47)
HCT VFR BLD AUTO: 47.8 % (ref 37–47)
HCT VFR BLD AUTO: 47.9 % (ref 37–47)
HCT VFR BLD AUTO: 48.7 % (ref 37–47)
HDLC SERPL-MCNC: 55 MG/DL
HGB BLD-MCNC: 13.3 G/DL (ref 12–16)
HGB BLD-MCNC: 14.2 G/DL (ref 12–16)
HGB BLD-MCNC: 14.5 G/DL (ref 12–16)
HGB BLD-MCNC: 14.8 G/DL (ref 12–16)
HGB BLD-MCNC: 15.5 G/DL (ref 12–16)
HGB BLD-MCNC: 15.6 G/DL (ref 12–16)
HGB BLD-MCNC: 15.6 G/DL (ref 12–16)
HYALINE CASTS #/AREA URNS LPF: ABNORMAL /LPF
HYALINE CASTS #/AREA URNS LPF: NORMAL /LPF
IMM GRANULOCYTES # BLD AUTO: 0.04 K/UL (ref 0–0.11)
IMM GRANULOCYTES # BLD AUTO: 0.04 K/UL (ref 0–0.11)
IMM GRANULOCYTES # BLD AUTO: 0.05 K/UL (ref 0–0.11)
IMM GRANULOCYTES # BLD AUTO: 0.05 K/UL (ref 0–0.11)
IMM GRANULOCYTES # BLD AUTO: 0.06 K/UL (ref 0–0.11)
IMM GRANULOCYTES # BLD AUTO: 0.06 K/UL (ref 0–0.11)
IMM GRANULOCYTES # BLD AUTO: 0.1 K/UL (ref 0–0.11)
IMM GRANULOCYTES NFR BLD AUTO: 0.4 % (ref 0–0.9)
IMM GRANULOCYTES NFR BLD AUTO: 0.4 % (ref 0–0.9)
IMM GRANULOCYTES NFR BLD AUTO: 0.5 % (ref 0–0.9)
IMM GRANULOCYTES NFR BLD AUTO: 0.5 % (ref 0–0.9)
IMM GRANULOCYTES NFR BLD AUTO: 0.6 % (ref 0–0.9)
IMM GRANULOCYTES NFR BLD AUTO: 0.8 % (ref 0–0.9)
IMM GRANULOCYTES NFR BLD AUTO: 0.8 % (ref 0–0.9)
INR PPP: 1.03 (ref 0.87–1.13)
KETONES UR STRIP.AUTO-MCNC: 15 MG/DL
KETONES UR STRIP.AUTO-MCNC: NEGATIVE MG/DL
KETONES UR STRIP.AUTO-MCNC: NEGATIVE MG/DL
LDLC SERPL CALC-MCNC: 130 MG/DL
LEUKOCYTE ESTERASE UR QL STRIP.AUTO: ABNORMAL
LYMPHOCYTES # BLD AUTO: 1.32 K/UL (ref 1–4.8)
LYMPHOCYTES # BLD AUTO: 1.45 K/UL (ref 1–4.8)
LYMPHOCYTES # BLD AUTO: 1.59 K/UL (ref 1–4.8)
LYMPHOCYTES # BLD AUTO: 1.67 K/UL (ref 1–4.8)
LYMPHOCYTES # BLD AUTO: 1.69 K/UL (ref 1–4.8)
LYMPHOCYTES # BLD AUTO: 2.01 K/UL (ref 1–4.8)
LYMPHOCYTES # BLD AUTO: 2.1 K/UL (ref 1–4.8)
LYMPHOCYTES NFR BLD: 10.4 % (ref 22–41)
LYMPHOCYTES NFR BLD: 12.3 % (ref 22–41)
LYMPHOCYTES NFR BLD: 17 % (ref 22–41)
LYMPHOCYTES NFR BLD: 17.6 % (ref 22–41)
LYMPHOCYTES NFR BLD: 19.3 % (ref 22–41)
LYMPHOCYTES NFR BLD: 23.4 % (ref 22–41)
LYMPHOCYTES NFR BLD: 25.3 % (ref 22–41)
MCH RBC QN AUTO: 28.4 PG (ref 27–33)
MCH RBC QN AUTO: 29.1 PG (ref 27–33)
MCH RBC QN AUTO: 29.4 PG (ref 27–33)
MCH RBC QN AUTO: 29.5 PG (ref 27–33)
MCH RBC QN AUTO: 30.1 PG (ref 27–33)
MCH RBC QN AUTO: 30.9 PG (ref 27–33)
MCH RBC QN AUTO: 31 PG (ref 27–33)
MCHC RBC AUTO-ENTMCNC: 31.8 G/DL (ref 33.6–35)
MCHC RBC AUTO-ENTMCNC: 32 G/DL (ref 33.6–35)
MCHC RBC AUTO-ENTMCNC: 32.2 G/DL (ref 33.6–35)
MCHC RBC AUTO-ENTMCNC: 32.4 G/DL (ref 33.6–35)
MCHC RBC AUTO-ENTMCNC: 32.5 G/DL (ref 33.6–35)
MCHC RBC AUTO-ENTMCNC: 32.6 G/DL (ref 33.6–35)
MCHC RBC AUTO-ENTMCNC: 32.6 G/DL (ref 33.6–35)
MCV RBC AUTO: 87.7 FL (ref 81.4–97.8)
MCV RBC AUTO: 89.2 FL (ref 81.4–97.8)
MCV RBC AUTO: 90.5 FL (ref 81.4–97.8)
MCV RBC AUTO: 91.7 FL (ref 81.4–97.8)
MCV RBC AUTO: 94.7 FL (ref 81.4–97.8)
MCV RBC AUTO: 95 FL (ref 81.4–97.8)
MCV RBC AUTO: 96.4 FL (ref 81.4–97.8)
MICRO URNS: ABNORMAL
MICROALBUMIN UR-MCNC: <1.2 MG/DL
MICROALBUMIN/CREAT UR: NORMAL MG/G (ref 0–30)
MONOCYTES # BLD AUTO: 0.62 K/UL (ref 0–0.85)
MONOCYTES # BLD AUTO: 0.64 K/UL (ref 0–0.85)
MONOCYTES # BLD AUTO: 0.65 K/UL (ref 0–0.85)
MONOCYTES # BLD AUTO: 0.77 K/UL (ref 0–0.85)
MONOCYTES # BLD AUTO: 0.8 K/UL (ref 0–0.85)
MONOCYTES # BLD AUTO: 0.82 K/UL (ref 0–0.85)
MONOCYTES # BLD AUTO: 0.98 K/UL (ref 0–0.85)
MONOCYTES NFR BLD AUTO: 10 % (ref 0–13.4)
MONOCYTES NFR BLD AUTO: 6.5 % (ref 0–13.4)
MONOCYTES NFR BLD AUTO: 6.5 % (ref 0–13.4)
MONOCYTES NFR BLD AUTO: 6.9 % (ref 0–13.4)
MONOCYTES NFR BLD AUTO: 7.7 % (ref 0–13.4)
MONOCYTES NFR BLD AUTO: 8 % (ref 0–13.4)
MONOCYTES NFR BLD AUTO: 8.4 % (ref 0–13.4)
MUCOUS THREADS #/AREA URNS HPF: ABNORMAL /HPF
NEUTROPHILS # BLD AUTO: 10.17 K/UL (ref 2–7.15)
NEUTROPHILS # BLD AUTO: 4.82 K/UL (ref 2–7.15)
NEUTROPHILS # BLD AUTO: 5.6 K/UL (ref 2–7.15)
NEUTROPHILS # BLD AUTO: 5.91 K/UL (ref 2–7.15)
NEUTROPHILS # BLD AUTO: 6.78 K/UL (ref 2–7.15)
NEUTROPHILS # BLD AUTO: 7.29 K/UL (ref 2–7.15)
NEUTROPHILS # BLD AUTO: 9.3 K/UL (ref 2–7.15)
NEUTROPHILS NFR BLD: 60.5 % (ref 44–72)
NEUTROPHILS NFR BLD: 66 % (ref 44–72)
NEUTROPHILS NFR BLD: 68 % (ref 44–72)
NEUTROPHILS NFR BLD: 71.6 % (ref 44–72)
NEUTROPHILS NFR BLD: 73.1 % (ref 44–72)
NEUTROPHILS NFR BLD: 78.9 % (ref 44–72)
NEUTROPHILS NFR BLD: 80.3 % (ref 44–72)
NITRITE UR QL STRIP.AUTO: NEGATIVE
NITRITE UR QL STRIP.AUTO: NEGATIVE
NITRITE UR QL STRIP.AUTO: POSITIVE
NRBC # BLD AUTO: 0 K/UL
NRBC BLD-RTO: 0 /100 WBC
NUCLEAR IGG SER QL IA: NORMAL
PH UR STRIP.AUTO: 6 [PH] (ref 5–8)
PH UR STRIP.AUTO: 6.5 [PH] (ref 5–8)
PH UR STRIP.AUTO: 8 [PH] (ref 5–8)
PLATELET # BLD AUTO: 219 K/UL (ref 164–446)
PLATELET # BLD AUTO: 226 K/UL (ref 164–446)
PLATELET # BLD AUTO: 228 K/UL (ref 164–446)
PLATELET # BLD AUTO: 229 K/UL (ref 164–446)
PLATELET # BLD AUTO: 240 K/UL (ref 164–446)
PLATELET # BLD AUTO: 246 K/UL (ref 164–446)
PLATELET # BLD AUTO: 259 K/UL (ref 164–446)
PMV BLD AUTO: 10.6 FL (ref 9–12.9)
PMV BLD AUTO: 9.5 FL (ref 9–12.9)
PMV BLD AUTO: 9.5 FL (ref 9–12.9)
PMV BLD AUTO: 9.6 FL (ref 9–12.9)
PMV BLD AUTO: 9.6 FL (ref 9–12.9)
PMV BLD AUTO: 9.8 FL (ref 9–12.9)
PMV BLD AUTO: 9.9 FL (ref 9–12.9)
POTASSIUM SERPL-SCNC: 3.7 MMOL/L (ref 3.6–5.5)
POTASSIUM SERPL-SCNC: 3.9 MMOL/L (ref 3.6–5.5)
POTASSIUM SERPL-SCNC: 4.1 MMOL/L (ref 3.6–5.5)
POTASSIUM SERPL-SCNC: 4.2 MMOL/L (ref 3.6–5.5)
POTASSIUM SERPL-SCNC: 4.4 MMOL/L (ref 3.6–5.5)
POTASSIUM SERPL-SCNC: 4.5 MMOL/L (ref 3.6–5.5)
POTASSIUM SERPL-SCNC: 4.8 MMOL/L (ref 3.6–5.5)
PROT SERPL-MCNC: 6.7 G/DL (ref 6–8.2)
PROT SERPL-MCNC: 7.2 G/DL (ref 6–8.2)
PROT SERPL-MCNC: 7.4 G/DL (ref 6–8.2)
PROT SERPL-MCNC: 7.7 G/DL (ref 6–8.2)
PROT SERPL-MCNC: 7.7 G/DL (ref 6–8.2)
PROT SERPL-MCNC: 8 G/DL (ref 6–8.2)
PROT UR QL STRIP: NEGATIVE MG/DL
PROTHROMBIN TIME: 13.2 SEC (ref 12–14.6)
RBC # BLD AUTO: 4.52 M/UL (ref 4.2–5.4)
RBC # BLD AUTO: 4.81 M/UL (ref 4.2–5.4)
RBC # BLD AUTO: 4.91 M/UL (ref 4.2–5.4)
RBC # BLD AUTO: 4.99 M/UL (ref 4.2–5.4)
RBC # BLD AUTO: 5.04 M/UL (ref 4.2–5.4)
RBC # BLD AUTO: 5.05 M/UL (ref 4.2–5.4)
RBC # BLD AUTO: 5.45 M/UL (ref 4.2–5.4)
RBC # URNS HPF: ABNORMAL /HPF
RBC # URNS HPF: ABNORMAL /HPF
RBC # URNS HPF: NORMAL /HPF
RBC UR QL AUTO: ABNORMAL
RBC UR QL AUTO: ABNORMAL
RBC UR QL AUTO: NEGATIVE
RH BLD: NORMAL
RHEUMATOID FACT SER IA-ACNC: 29 IU/ML (ref 0–14)
RNAP III AB SER IA-ACNC: 140 UNITS (ref 0–19)
SARS-COV-2 RNA RESP QL NAA+PROBE: NOTDETECTED
SIGNIFICANT IND 70042: ABNORMAL
SIGNIFICANT IND 70042: ABNORMAL
SIGNIFICANT IND 70042: NORMAL
SIGNIFICANT IND 70042: NORMAL
SITE SITE: ABNORMAL
SITE SITE: ABNORMAL
SITE SITE: NORMAL
SITE SITE: NORMAL
SODIUM SERPL-SCNC: 135 MMOL/L (ref 135–145)
SODIUM SERPL-SCNC: 136 MMOL/L (ref 135–145)
SODIUM SERPL-SCNC: 137 MMOL/L (ref 135–145)
SODIUM SERPL-SCNC: 139 MMOL/L (ref 135–145)
SODIUM SERPL-SCNC: 139 MMOL/L (ref 135–145)
SOURCE SOURCE: ABNORMAL
SOURCE SOURCE: ABNORMAL
SOURCE SOURCE: NORMAL
SOURCE SOURCE: NORMAL
SP GR UR STRIP.AUTO: 1.01
SP GR UR STRIP.AUTO: 1.02
SP GR UR STRIP.AUTO: 1.02
SPECIMEN SOURCE: NORMAL
SSA52 R0ENA AB IGG Q0420: 1 AU/ML (ref 0–40)
SSA60 R0ENA AB IGG Q0419: 0 AU/ML (ref 0–40)
TRIGL SERPL-MCNC: 85 MG/DL (ref 0–149)
UNIDENT CRYS URNS QL MICRO: ABNORMAL /HPF
UROBILINOGEN UR STRIP.AUTO-MCNC: 0.2 MG/DL
UROBILINOGEN UR STRIP.AUTO-MCNC: 0.2 MG/DL
WBC # BLD AUTO: 10 K/UL (ref 4.8–10.8)
WBC # BLD AUTO: 11.8 K/UL (ref 4.8–10.8)
WBC # BLD AUTO: 12.7 K/UL (ref 4.8–10.8)
WBC # BLD AUTO: 8 K/UL (ref 4.8–10.8)
WBC # BLD AUTO: 8.2 K/UL (ref 4.8–10.8)
WBC # BLD AUTO: 9 K/UL (ref 4.8–10.8)
WBC # BLD AUTO: 9.5 K/UL (ref 4.8–10.8)
WBC #/AREA URNS HPF: ABNORMAL /HPF
WBC #/AREA URNS HPF: ABNORMAL /HPF
WBC #/AREA URNS HPF: NORMAL /HPF

## 2020-01-01 PROCEDURE — 36415 COLL VENOUS BLD VENIPUNCTURE: CPT

## 2020-01-01 PROCEDURE — 700111 HCHG RX REV CODE 636 W/ 250 OVERRIDE (IP): Performed by: FAMILY MEDICINE

## 2020-01-01 PROCEDURE — A9503 TC99M MEDRONATE: HCPCS

## 2020-01-01 PROCEDURE — 71100 X-RAY EXAM RIBS UNI 2 VIEWS: CPT

## 2020-01-01 PROCEDURE — 99214 OFFICE O/P EST MOD 30 MIN: CPT | Mod: 95,CR | Performed by: FAMILY MEDICINE

## 2020-01-01 PROCEDURE — 80053 COMPREHEN METABOLIC PANEL: CPT

## 2020-01-01 PROCEDURE — 80061 LIPID PANEL: CPT

## 2020-01-01 PROCEDURE — 86901 BLOOD TYPING SEROLOGIC RH(D): CPT

## 2020-01-01 PROCEDURE — 96365 THER/PROPH/DIAG IV INF INIT: CPT

## 2020-01-01 PROCEDURE — 87077 CULTURE AEROBIC IDENTIFY: CPT

## 2020-01-01 PROCEDURE — 86300 IMMUNOASSAY TUMOR CA 15-3: CPT

## 2020-01-01 PROCEDURE — 74177 CT ABD & PELVIS W/CONTRAST: CPT

## 2020-01-01 PROCEDURE — 99217 PR OBSERVATION CARE DISCHARGE: CPT | Performed by: FAMILY MEDICINE

## 2020-01-01 PROCEDURE — 81001 URINALYSIS AUTO W/SCOPE: CPT

## 2020-01-01 PROCEDURE — 85025 COMPLETE CBC W/AUTO DIFF WBC: CPT

## 2020-01-01 PROCEDURE — C9803 HOPD COVID-19 SPEC COLLECT: HCPCS | Performed by: FAMILY MEDICINE

## 2020-01-01 PROCEDURE — 700111 HCHG RX REV CODE 636 W/ 250 OVERRIDE (IP): Performed by: EMERGENCY MEDICINE

## 2020-01-01 PROCEDURE — 99214 OFFICE O/P EST MOD 30 MIN: CPT | Performed by: INTERNAL MEDICINE

## 2020-01-01 PROCEDURE — G0378 HOSPITAL OBSERVATION PER HR: HCPCS

## 2020-01-01 PROCEDURE — 700105 HCHG RX REV CODE 258: Performed by: FAMILY MEDICINE

## 2020-01-01 PROCEDURE — A9270 NON-COVERED ITEM OR SERVICE: HCPCS | Performed by: FAMILY MEDICINE

## 2020-01-01 PROCEDURE — 94729 DIFFUSING CAPACITY: CPT | Performed by: INTERNAL MEDICINE

## 2020-01-01 PROCEDURE — 86431 RHEUMATOID FACTOR QUANT: CPT

## 2020-01-01 PROCEDURE — 82043 UR ALBUMIN QUANTITATIVE: CPT

## 2020-01-01 PROCEDURE — 71046 X-RAY EXAM CHEST 2 VIEWS: CPT

## 2020-01-01 PROCEDURE — 87186 SC STD MICRODIL/AGAR DIL: CPT

## 2020-01-01 PROCEDURE — 700117 HCHG RX CONTRAST REV CODE 255: Performed by: INTERNAL MEDICINE

## 2020-01-01 PROCEDURE — 86235 NUCLEAR ANTIGEN ANTIBODY: CPT

## 2020-01-01 PROCEDURE — 94726 PLETHYSMOGRAPHY LUNG VOLUMES: CPT | Performed by: INTERNAL MEDICINE

## 2020-01-01 PROCEDURE — U0003 INFECTIOUS AGENT DETECTION BY NUCLEIC ACID (DNA OR RNA); SEVERE ACUTE RESPIRATORY SYNDROME CORONAVIRUS 2 (SARS-COV-2) (CORONAVIRUS DISEASE [COVID-19]), AMPLIFIED PROBE TECHNIQUE, MAKING USE OF HIGH THROUGHPUT TECHNOLOGIES AS DESCRIBED BY CMS-2020-01-R: HCPCS

## 2020-01-01 PROCEDURE — 87086 URINE CULTURE/COLONY COUNT: CPT

## 2020-01-01 PROCEDURE — 83516 IMMUNOASSAY NONANTIBODY: CPT

## 2020-01-01 PROCEDURE — 700102 HCHG RX REV CODE 250 W/ 637 OVERRIDE(OP): Performed by: FAMILY MEDICINE

## 2020-01-01 PROCEDURE — 86200 CCP ANTIBODY: CPT

## 2020-01-01 PROCEDURE — 700117 HCHG RX CONTRAST REV CODE 255: Performed by: EMERGENCY MEDICINE

## 2020-01-01 PROCEDURE — 85730 THROMBOPLASTIN TIME PARTIAL: CPT

## 2020-01-01 PROCEDURE — 84703 CHORIONIC GONADOTROPIN ASSAY: CPT

## 2020-01-01 PROCEDURE — 94762 N-INVAS EAR/PLS OXIMTRY CONT: CPT | Performed by: INTERNAL MEDICINE

## 2020-01-01 PROCEDURE — 94618 PULMONARY STRESS TESTING: CPT | Performed by: INTERNAL MEDICINE

## 2020-01-01 PROCEDURE — 94060 EVALUATION OF WHEEZING: CPT | Performed by: INTERNAL MEDICINE

## 2020-01-01 PROCEDURE — 82570 ASSAY OF URINE CREATININE: CPT

## 2020-01-01 PROCEDURE — 86900 BLOOD TYPING SEROLOGIC ABO: CPT

## 2020-01-01 PROCEDURE — 85610 PROTHROMBIN TIME: CPT

## 2020-01-01 PROCEDURE — 99285 EMERGENCY DEPT VISIT HI MDM: CPT

## 2020-01-01 PROCEDURE — 96376 TX/PRO/DX INJ SAME DRUG ADON: CPT

## 2020-01-01 PROCEDURE — 71250 CT THORAX DX C-: CPT

## 2020-01-01 PROCEDURE — 8041 PR SCP AHA: Mod: 95,CR | Performed by: FAMILY MEDICINE

## 2020-01-01 PROCEDURE — 96375 TX/PRO/DX INJ NEW DRUG ADDON: CPT

## 2020-01-01 PROCEDURE — 99215 OFFICE O/P EST HI 40 MIN: CPT | Performed by: FAMILY MEDICINE

## 2020-01-01 PROCEDURE — 87040 BLOOD CULTURE FOR BACTERIA: CPT

## 2020-01-01 PROCEDURE — 86039 ANTINUCLEAR ANTIBODIES (ANA): CPT

## 2020-01-01 PROCEDURE — 86038 ANTINUCLEAR ANTIBODIES: CPT

## 2020-01-01 PROCEDURE — 99219 PR INITIAL OBSERVATION CARE,LEVL II: CPT | Performed by: FAMILY MEDICINE

## 2020-01-01 PROCEDURE — 99215 OFFICE O/P EST HI 40 MIN: CPT | Mod: 95,CR | Performed by: FAMILY MEDICINE

## 2020-01-01 PROCEDURE — 99205 OFFICE O/P NEW HI 60 MIN: CPT | Performed by: INTERNAL MEDICINE

## 2020-01-01 PROCEDURE — 80048 BASIC METABOLIC PNL TOTAL CA: CPT

## 2020-01-01 PROCEDURE — 94760 N-INVAS EAR/PLS OXIMETRY 1: CPT

## 2020-01-01 PROCEDURE — 83036 HEMOGLOBIN GLYCOSYLATED A1C: CPT

## 2020-01-01 PROCEDURE — 700105 HCHG RX REV CODE 258: Performed by: EMERGENCY MEDICINE

## 2020-01-01 RX ORDER — POLYETHYLENE GLYCOL 3350 17 G/17G
1 POWDER, FOR SOLUTION ORAL
Status: DISCONTINUED | OUTPATIENT
Start: 2020-01-01 | End: 2020-01-01 | Stop reason: HOSPADM

## 2020-01-01 RX ORDER — SODIUM CHLORIDE 9 MG/ML
INJECTION, SOLUTION INTRAVENOUS CONTINUOUS
Status: ACTIVE | OUTPATIENT
Start: 2020-01-01 | End: 2020-01-01

## 2020-01-01 RX ORDER — ALPRAZOLAM 0.25 MG/1
0.25 TABLET ORAL NIGHTLY PRN
Status: DISCONTINUED | OUTPATIENT
Start: 2020-01-01 | End: 2020-01-01 | Stop reason: HOSPADM

## 2020-01-01 RX ORDER — PREDNISONE 20 MG/1
40 TABLET ORAL DAILY
Qty: 10 TAB | Refills: 0 | Status: SHIPPED | OUTPATIENT
Start: 2020-01-01 | End: 2020-01-01

## 2020-01-01 RX ORDER — OXYCODONE HYDROCHLORIDE AND ACETAMINOPHEN 5; 325 MG/1; MG/1
TABLET ORAL
COMMUNITY
Start: 2020-01-01

## 2020-01-01 RX ORDER — SODIUM CHLORIDE 9 MG/ML
1000 INJECTION, SOLUTION INTRAVENOUS ONCE
Status: COMPLETED | OUTPATIENT
Start: 2020-01-01 | End: 2020-01-01

## 2020-01-01 RX ORDER — DAPSONE 100 MG/1
100 TABLET ORAL DAILY
Qty: 28 TAB | Refills: 0 | Status: SHIPPED
Start: 2020-01-01 | End: 2020-01-01

## 2020-01-01 RX ORDER — NITROFURANTOIN MACROCRYSTALS 100 MG/1
100 CAPSULE ORAL 4 TIMES DAILY
COMMUNITY
End: 2020-01-01

## 2020-01-01 RX ORDER — NITROFURANTOIN 25; 75 MG/1; MG/1
100 CAPSULE ORAL 2 TIMES DAILY
Qty: 14 CAP | Refills: 0 | Status: ON HOLD
Start: 2020-01-01 | End: 2020-01-01

## 2020-01-01 RX ORDER — TRAMADOL HYDROCHLORIDE 50 MG/1
50 TABLET ORAL EVERY 8 HOURS PRN
Qty: 30 TAB | Refills: 0 | Status: SHIPPED | OUTPATIENT
Start: 2020-01-01 | End: 2020-01-01

## 2020-01-01 RX ORDER — EZETIMIBE 10 MG/1
10 TABLET ORAL DAILY
Qty: 90 TAB | Refills: 1 | Status: SHIPPED
Start: 2020-01-01 | End: 2020-01-01

## 2020-01-01 RX ORDER — LUBIPROSTONE 24 UG/1
24 CAPSULE ORAL 2 TIMES DAILY WITH MEALS
Qty: 60 CAP | Refills: 2 | Status: SHIPPED | OUTPATIENT
Start: 2020-01-01

## 2020-01-01 RX ORDER — ALBUTEROL SULFATE 90 UG/1
2 AEROSOL, METERED RESPIRATORY (INHALATION) EVERY 6 HOURS PRN
Qty: 8.5 G | Refills: 5 | Status: SHIPPED | OUTPATIENT
Start: 2020-01-01 | End: 2020-01-01 | Stop reason: SDUPTHER

## 2020-01-01 RX ORDER — EZETIMIBE 10 MG/1
10 TABLET ORAL DAILY
Status: DISCONTINUED | OUTPATIENT
Start: 2020-01-01 | End: 2020-01-01 | Stop reason: HOSPADM

## 2020-01-01 RX ORDER — ALBUTEROL SULFATE 90 UG/1
2 AEROSOL, METERED RESPIRATORY (INHALATION) EVERY 6 HOURS PRN
Status: DISCONTINUED | OUTPATIENT
Start: 2020-01-01 | End: 2020-01-01 | Stop reason: HOSPADM

## 2020-01-01 RX ORDER — PREDNISONE 20 MG/1
TABLET ORAL
Qty: 90 TAB | Refills: 2 | Status: SHIPPED
Start: 2020-01-01 | End: 2020-01-01

## 2020-01-01 RX ORDER — ALBUTEROL SULFATE 90 UG/1
2 AEROSOL, METERED RESPIRATORY (INHALATION) EVERY 6 HOURS PRN
Qty: 8.5 G | Refills: 5 | Status: SHIPPED
Start: 2020-01-01 | End: 2020-01-01

## 2020-01-01 RX ORDER — ONDANSETRON 4 MG/1
4 TABLET, ORALLY DISINTEGRATING ORAL EVERY 4 HOURS PRN
Qty: 10 TAB | Refills: 0 | Status: SHIPPED | OUTPATIENT
Start: 2020-01-01

## 2020-01-01 RX ORDER — TRAMADOL HYDROCHLORIDE 50 MG/1
50 TABLET ORAL EVERY 4 HOURS PRN
COMMUNITY
End: 2020-01-01

## 2020-01-01 RX ORDER — HYDROMORPHONE HYDROCHLORIDE 1 MG/ML
0.5 INJECTION, SOLUTION INTRAMUSCULAR; INTRAVENOUS; SUBCUTANEOUS
Status: DISCONTINUED | OUTPATIENT
Start: 2020-01-01 | End: 2020-01-01 | Stop reason: HOSPADM

## 2020-01-01 RX ORDER — CEFDINIR 300 MG/1
300 CAPSULE ORAL 2 TIMES DAILY
Qty: 14 CAP | Refills: 0 | Status: SHIPPED
Start: 2020-01-01 | End: 2020-01-01

## 2020-01-01 RX ORDER — TRAMADOL HYDROCHLORIDE 50 MG/1
50 TABLET ORAL EVERY 4 HOURS PRN
Status: DISCONTINUED | OUTPATIENT
Start: 2020-01-01 | End: 2020-01-01 | Stop reason: HOSPADM

## 2020-01-01 RX ORDER — ALPRAZOLAM 0.25 MG/1
0.25 TABLET ORAL NIGHTLY PRN
Qty: 30 TAB | Refills: 0 | Status: SHIPPED | OUTPATIENT
Start: 2020-01-01 | End: 2021-01-01

## 2020-01-01 RX ORDER — FENTANYL 25 UG/1
PATCH TRANSDERMAL
COMMUNITY
Start: 2020-01-01 | End: 2021-01-01

## 2020-01-01 RX ORDER — OMEPRAZOLE 40 MG/1
40 CAPSULE, DELAYED RELEASE ORAL DAILY
COMMUNITY

## 2020-01-01 RX ORDER — TRAMADOL HYDROCHLORIDE 50 MG/1
50 TABLET ORAL EVERY 6 HOURS PRN
Qty: 20 TAB | Refills: 0 | Status: SHIPPED | OUTPATIENT
Start: 2020-01-01 | End: 2020-01-01

## 2020-01-01 RX ORDER — IBUPROFEN 200 MG
400 TABLET ORAL EVERY 6 HOURS PRN
Status: ON HOLD | COMMUNITY
End: 2020-01-01

## 2020-01-01 RX ORDER — AMLODIPINE BESYLATE 5 MG/1
5 TABLET ORAL DAILY
Status: DISCONTINUED | OUTPATIENT
Start: 2020-01-01 | End: 2020-01-01 | Stop reason: HOSPADM

## 2020-01-01 RX ORDER — ENALAPRILAT 1.25 MG/ML
1.25 INJECTION INTRAVENOUS EVERY 6 HOURS PRN
Status: DISCONTINUED | OUTPATIENT
Start: 2020-01-01 | End: 2020-01-01 | Stop reason: HOSPADM

## 2020-01-01 RX ORDER — NAPROXEN 500 MG/1
500 TABLET ORAL
Qty: 60 TAB | Refills: 0 | Status: ON HOLD
Start: 2020-01-01 | End: 2020-01-01

## 2020-01-01 RX ORDER — ATORVASTATIN CALCIUM 20 MG/1
20 TABLET, FILM COATED ORAL
Qty: 90 TAB | Refills: 1 | Status: SHIPPED
Start: 2020-01-01 | End: 2020-01-01

## 2020-01-01 RX ORDER — ONDANSETRON 4 MG/1
4 TABLET, ORALLY DISINTEGRATING ORAL EVERY 4 HOURS PRN
Status: DISCONTINUED | OUTPATIENT
Start: 2020-01-01 | End: 2020-01-01 | Stop reason: HOSPADM

## 2020-01-01 RX ORDER — AMOXICILLIN 250 MG
2 CAPSULE ORAL 2 TIMES DAILY
Status: DISCONTINUED | OUTPATIENT
Start: 2020-01-01 | End: 2020-01-01 | Stop reason: HOSPADM

## 2020-01-01 RX ORDER — ONDANSETRON 2 MG/ML
4 INJECTION INTRAMUSCULAR; INTRAVENOUS EVERY 4 HOURS PRN
Status: DISCONTINUED | OUTPATIENT
Start: 2020-01-01 | End: 2020-01-01 | Stop reason: HOSPADM

## 2020-01-01 RX ORDER — BISACODYL 10 MG
10 SUPPOSITORY, RECTAL RECTAL
Status: DISCONTINUED | OUTPATIENT
Start: 2020-01-01 | End: 2020-01-01 | Stop reason: HOSPADM

## 2020-01-01 RX ORDER — HYDROMORPHONE HYDROCHLORIDE 1 MG/ML
0.5 INJECTION, SOLUTION INTRAMUSCULAR; INTRAVENOUS; SUBCUTANEOUS ONCE
Status: COMPLETED | OUTPATIENT
Start: 2020-01-01 | End: 2020-01-01

## 2020-01-01 RX ORDER — ONDANSETRON 2 MG/ML
4 INJECTION INTRAMUSCULAR; INTRAVENOUS ONCE
Status: COMPLETED | OUTPATIENT
Start: 2020-01-01 | End: 2020-01-01

## 2020-01-01 RX ADMIN — ONDANSETRON 4 MG: 2 INJECTION INTRAMUSCULAR; INTRAVENOUS at 07:25

## 2020-01-01 RX ADMIN — CEFTRIAXONE SODIUM 1 G: 1 INJECTION, POWDER, FOR SOLUTION INTRAMUSCULAR; INTRAVENOUS at 06:24

## 2020-01-01 RX ADMIN — SENNOSIDES-DOCUSATE SODIUM TAB 8.6-50 MG 2 TABLET: 8.6-5 TAB at 18:35

## 2020-01-01 RX ADMIN — SODIUM CHLORIDE 1000 ML: 9 INJECTION, SOLUTION INTRAVENOUS at 07:25

## 2020-01-01 RX ADMIN — ONDANSETRON 4 MG: 2 INJECTION INTRAMUSCULAR; INTRAVENOUS at 11:13

## 2020-01-01 RX ADMIN — HYDROMORPHONE HYDROCHLORIDE 0.5 MG: 1 INJECTION, SOLUTION INTRAMUSCULAR; INTRAVENOUS; SUBCUTANEOUS at 11:04

## 2020-01-01 RX ADMIN — HYDROMORPHONE HYDROCHLORIDE 0.5 MG: 1 INJECTION, SOLUTION INTRAMUSCULAR; INTRAVENOUS; SUBCUTANEOUS at 22:41

## 2020-01-01 RX ADMIN — HYDROMORPHONE HYDROCHLORIDE 0.5 MG: 1 INJECTION, SOLUTION INTRAMUSCULAR; INTRAVENOUS; SUBCUTANEOUS at 07:25

## 2020-01-01 RX ADMIN — SODIUM CHLORIDE: 9 INJECTION, SOLUTION INTRAVENOUS at 10:00

## 2020-01-01 RX ADMIN — TRAMADOL HYDROCHLORIDE 50 MG: 50 TABLET, FILM COATED ORAL at 02:41

## 2020-01-01 RX ADMIN — IOHEXOL 100 ML: 350 INJECTION, SOLUTION INTRAVENOUS at 08:07

## 2020-01-01 RX ADMIN — IOHEXOL 25 ML: 240 INJECTION, SOLUTION INTRATHECAL; INTRAVASCULAR; INTRAVENOUS; ORAL at 13:53

## 2020-01-01 RX ADMIN — CEFTRIAXONE SODIUM 1 G: 1 INJECTION, POWDER, FOR SOLUTION INTRAMUSCULAR; INTRAVENOUS at 08:57

## 2020-01-01 RX ADMIN — IOHEXOL 100 ML: 350 INJECTION, SOLUTION INTRAVENOUS at 13:53

## 2020-01-01 RX ADMIN — ONDANSETRON 4 MG: 2 INJECTION INTRAMUSCULAR; INTRAVENOUS at 22:42

## 2020-01-01 ASSESSMENT — ENCOUNTER SYMPTOMS
TREMORS: 0
DEPRESSION: 0
DEPRESSION: 0
EYE DISCHARGE: 0
DEPRESSION: 0
TREMORS: 0
MYALGIAS: 0
SPEECH CHANGE: 0
CHILLS: 0
WEIGHT LOSS: 0
FALLS: 0
NAUSEA: 0
DIZZINESS: 0
PALPITATIONS: 0
DIZZINESS: 0
CLAUDICATION: 0
PHOTOPHOBIA: 0
EYE REDNESS: 0
ABDOMINAL PAIN: 0
WHEEZING: 0
NAUSEA: 0
BACK PAIN: 0
CONSTIPATION: 0
BLURRED VISION: 0
SORE THROAT: 0
CHILLS: 0
CONSTIPATION: 0
VOMITING: 0
CLAUDICATION: 0
HEADACHES: 0
TINGLING: 0
DOUBLE VISION: 0
HEADACHES: 0
NAUSEA: 0
HEADACHES: 0
PHOTOPHOBIA: 0
HEARTBURN: 0
MYALGIAS: 0
DIZZINESS: 0
PND: 0
ABDOMINAL PAIN: 0
SPEECH CHANGE: 0
SORE THROAT: 0
ORTHOPNEA: 0
COUGH: 0
ORTHOPNEA: 0
NERVOUS/ANXIOUS: 1
HEMOPTYSIS: 0
STRIDOR: 0
PALPITATIONS: 0
ORTHOPNEA: 0
COUGH: 1
WEAKNESS: 0
BLURRED VISION: 0
DIARRHEA: 0
DIAPHORESIS: 0
SHORTNESS OF BREATH: 1
PND: 0
SPUTUM PRODUCTION: 0
HEMOPTYSIS: 0
WHEEZING: 0
HEADACHES: 0
SPUTUM PRODUCTION: 0
HEMOPTYSIS: 0
WEAKNESS: 0
SPEECH CHANGE: 0
DOUBLE VISION: 0
VOMITING: 0
TREMORS: 0
NECK PAIN: 0
MYALGIAS: 0
DOUBLE VISION: 0
MYALGIAS: 0
FOCAL WEAKNESS: 0
HEMOPTYSIS: 0
CLAUDICATION: 0
PND: 0
CHILLS: 0
WEAKNESS: 0
BLURRED VISION: 0
NECK PAIN: 0
VOMITING: 0
CHILLS: 0
DIARRHEA: 0
FEVER: 1
COUGH: 0
FLANK PAIN: 1
SINUS PAIN: 0
FALLS: 0
SPUTUM PRODUCTION: 0
NECK PAIN: 0
BACK PAIN: 1
SHORTNESS OF BREATH: 0
EYE PAIN: 0
FOCAL WEAKNESS: 0
FOCAL WEAKNESS: 0
DOUBLE VISION: 0
FALLS: 0
PHOTOPHOBIA: 0
HEARTBURN: 0
DIAPHORESIS: 0
DIZZINESS: 0
WEIGHT LOSS: 0
PALPITATIONS: 0
PALPITATIONS: 0
EYE REDNESS: 0
DIARRHEA: 0
ORTHOPNEA: 0
HEARTBURN: 0
WHEEZING: 0
ABDOMINAL PAIN: 0
SINUS PAIN: 0
FEVER: 0
DEPRESSION: 0
WEIGHT LOSS: 1
WEIGHT LOSS: 0
EYE DISCHARGE: 0
BLURRED VISION: 0
EYE PAIN: 0
HEARTBURN: 0
STRIDOR: 0
SINUS PAIN: 0
EYE DISCHARGE: 0
DIAPHORESIS: 0
FEVER: 0
PHOTOPHOBIA: 0
SORE THROAT: 0
EYE PAIN: 0
NAUSEA: 1
EYE REDNESS: 0
COUGH: 0
NECK PAIN: 0
SHORTNESS OF BREATH: 1
VOMITING: 0
STRIDOR: 0
CONSTIPATION: 0
BACK PAIN: 1
FEVER: 0

## 2020-01-01 ASSESSMENT — 6 MINUTE WALK TEST (6MWT)
HEART RATE AT 4 MIN: 118
PERCEIVED FATIGUE AT 2 MIN: 0
NUMBER OF RESTS: 0
HEART RATE AT 2 MIN: 115
HEART RATE AT 1 MIN: 102
COMMENTS: TEST ON ROOM AIR.
PERCEIVED FATIGUE AT 1 MIN: 0
SAO2 AT 3 MIN: 88
PERCEIVED BREATHLESSNESS AT 3 MIN: 0
SAO2 AT 6 MIN: 91
SITTING BLOOD PRESSURE: 112/74
TOTAL REST TIME: 0
PERCEIVED FATIGUE AT 4 MIN: 0
O2 SAT PERCENT ROOM AIR: 94
PERCEIVED FATIGUE AT 5 MIN: 0
SAO2 AT 1 MIN: 96
SAO2 AT 2 MIN: 88
SAO2 AT 5 MIN: 90
SAO2 AT 1 MIN: 92
PERCENT OF NORMAL WALKED: 56
PERCEIVED BREATHLESSNESS AT 1 MIN: 0
HEART RATE AT 5 MIN: 114
PERCEIVED BREATHLESSNESS AT 6 MIN: 0
PERCEIVED BREATHLESSNESS AT 1 MIN: 0
BLOOD PRESSURE AT 1 MIN: 116/78
HEART RATE AT 1 MIN: 105
SAO2 AT 2 MIN: 93
AMBULATES WITH O2: WITHOUT O2
PERCEIVED FATIGUE AT 6 MIN: 0
PERCEIVED FATIGUE AT 3 MIN: 0
PERCEIVED FATIGUE AT 1 MIN: 0
BLOOD PRESSURE: LEFT ARM
SAO2 AT 4 MIN: 87
HEART RATE AT 3 MIN: 115
HEART RATE AT 2 MIN: 105
HEART RATE AT 6 MIN: 116
PERCEIVED BREATHLESSNESS AT 2 MIN: 0
PERCEIVED BREATHLESSNESS AT 2 MIN: 0
HEART RATE: 93
PERCEIVED FATIGUE AT 2 MIN: 0
PERCEIVED BREATHLESSNESS AT 5 MIN: 0
PERCEIVED BREATHLESSNESS AT 4 MIN: 0

## 2020-01-01 ASSESSMENT — PULMONARY FUNCTION TESTS
FEV1_PERCENT_PREDICTED: 90
FEV1_PERCENT_CHANGE: 5
FEV1/FVC_PREDICTED: 78
FEV1: 1.82
FVC_PERCENT_PREDICTED: 83
FEV1_PERCENT_PREDICTED: 96
FEV1_LLN: 1.69
FVC: 2.2
FVC_LLN: 2.19
FVC: 2.32
FEV1/FVC: 84.05
FEV1/FVC_PERCENT_CHANGE: 120
FEV1/FVC_PERCENT_PREDICTED: 108
FEV1/FVC_PERCENT_PREDICTED: 77
FEV1: 1.95
FEV1_PREDICTED: 2.03
FVC_PREDICTED: 2.63
FEV1/FVC_PERCENT_LLN: 65
FEV1/FVC: 84
FEV1_PERCENT_CHANGE: 6
FEV1/FVC: 83
FEV1/FVC_PERCENT_PREDICTED: 180
FEV1/FVC_PERCENT_PREDICTED: 106
FEV1/FVC_PERCENT_PREDICTED: 109
FVC_PERCENT_PREDICTED: 88
FEV1/FVC: 83
FEV1/FVC_PERCENT_CHANGE: 1

## 2020-01-01 ASSESSMENT — PAIN DESCRIPTION - DESCRIPTORS: DESCRIPTORS: SHOOTING;SHARP

## 2020-01-01 ASSESSMENT — COPD QUESTIONNAIRES
DO YOU EVER COUGH UP ANY MUCUS OR PHLEGM?: NO/ONLY WITH OCCASIONAL COLDS OR INFECTIONS
HAVE YOU SMOKED AT LEAST 100 CIGARETTES IN YOUR ENTIRE LIFE: NO/DON'T KNOW
DURING THE PAST 4 WEEKS HOW MUCH DID YOU FEEL SHORT OF BREATH: SOME OF THE TIME
COPD SCREENING SCORE: 4

## 2020-01-01 ASSESSMENT — LIFESTYLE VARIABLES
DO YOU DRINK ALCOHOL: NO
TOTAL SCORE: 0
HAVE YOU EVER FELT YOU SHOULD CUT DOWN ON YOUR DRINKING: NO
TOTAL SCORE: 0
EVER_SMOKED: NEVER
CONSUMPTION TOTAL: INCOMPLETE
SUBSTANCE_ABUSE: 0
EVER HAD A DRINK FIRST THING IN THE MORNING TO STEADY YOUR NERVES TO GET RID OF A HANGOVER: NO
TOTAL SCORE: 0
HAVE PEOPLE ANNOYED YOU BY CRITICIZING YOUR DRINKING: NO
EVER FELT BAD OR GUILTY ABOUT YOUR DRINKING: NO

## 2020-01-01 ASSESSMENT — FIBROSIS 4 INDEX
FIB4 SCORE: 1.74
FIB4 SCORE: 1.743343473850720098
FIB4 SCORE: 1.74
FIB4 SCORE: 2.8
FIB4 SCORE: 3
FIB4 SCORE: 1.74
FIB4 SCORE: 2.8
FIB4 SCORE: 1.74

## 2020-01-01 ASSESSMENT — PAIN SCALES - GENERAL: PAINLEVEL: NO PAIN

## 2020-01-11 ENCOUNTER — OFFICE VISIT (OUTPATIENT)
Dept: URGENT CARE | Facility: CLINIC | Age: 75
End: 2020-01-11
Payer: MEDICARE

## 2020-01-11 ENCOUNTER — APPOINTMENT (OUTPATIENT)
Dept: RADIOLOGY | Facility: IMAGING CENTER | Age: 75
End: 2020-01-11
Attending: PHYSICIAN ASSISTANT
Payer: MEDICARE

## 2020-01-11 VITALS
HEART RATE: 102 BPM | RESPIRATION RATE: 16 BRPM | BODY MASS INDEX: 24.75 KG/M2 | HEIGHT: 64 IN | DIASTOLIC BLOOD PRESSURE: 70 MMHG | OXYGEN SATURATION: 95 % | WEIGHT: 145 LBS | TEMPERATURE: 98.6 F | SYSTOLIC BLOOD PRESSURE: 130 MMHG

## 2020-01-11 DIAGNOSIS — R05.9 COUGH: ICD-10-CM

## 2020-01-11 DIAGNOSIS — Z98.890 HISTORY OF LUNG BIOPSY: ICD-10-CM

## 2020-01-11 PROCEDURE — 71046 X-RAY EXAM CHEST 2 VIEWS: CPT | Mod: TC,FY | Performed by: PHYSICIAN ASSISTANT

## 2020-01-11 PROCEDURE — 99213 OFFICE O/P EST LOW 20 MIN: CPT | Performed by: PHYSICIAN ASSISTANT

## 2020-01-11 ASSESSMENT — PAIN SCALES - GENERAL: PAINLEVEL: 6=MODERATE PAIN

## 2020-01-11 ASSESSMENT — ENCOUNTER SYMPTOMS
FEVER: 0
TINGLING: 0
NAUSEA: 0
SHORTNESS OF BREATH: 0
DIZZINESS: 0
SORE THROAT: 0
VOMITING: 0
COUGH: 1
SPUTUM PRODUCTION: 0
CHILLS: 0
WHEEZING: 0
SENSORY CHANGE: 0
ABDOMINAL PAIN: 0
MYALGIAS: 0

## 2020-01-12 NOTE — PROGRESS NOTES
"Subjective:   Katie Monroy  is a 74 y.o. female who presents for Shortness of Breath (right lung Biopsy x 2 days and is having difficulty breathing)        Patient comes clinic complaining of concern for the possibility of collapsed lung.  She is 2 days status post lung biopsy.  Patient is status post breast cancer with metastatic progression to the lungs.  She denies fevers chills.  She notes very mild cough but denies significant change in this.  She denies shortness of breath but states \"maybe a little\".  She admits to concern and worry.  She is status post a lung biopsy for similar reasons last June that left her with a collapsed lung.  She had a significant complication thereafter.  She denies feeling particularly ill today but is worried.  She would like a chest x-ray to check her lungs.  She notes an hour status post procedure 2 days ago chest x-ray was found to be normal.  She denies chest pain or palpitations.  She denies fevers chills.  She denies sore throat or ear pain.  She notes mild dry somewhat chronic coughing without significant change.    Shortness of Breath   Pertinent negatives include no abdominal pain, ear pain, fever, rash, sore throat, sputum production, vomiting or wheezing.     Review of Systems   Constitutional: Negative for chills and fever.   HENT: Negative for congestion, ear pain and sore throat.    Respiratory: Positive for cough. Negative for sputum production, shortness of breath and wheezing.    Gastrointestinal: Negative for abdominal pain, nausea and vomiting.   Musculoskeletal: Negative for myalgias.   Skin: Negative for rash.   Neurological: Negative for dizziness, tingling and sensory change.     Allergies   Allergen Reactions   • Tetanus-Diphth-Acell Pertussis      States that she had excessive swelling. She denies allergies to the new tetanus just the old one   I have worn a mask for the entire encounter with this patient.    Objective:   /70   Pulse (!) 102   " "Temp 37 °C (98.6 °F) (Temporal)   Resp 16   Ht 1.626 m (5' 4\")   Wt 65.8 kg (145 lb)   SpO2 95%   BMI 24.89 kg/m²   Physical Exam  Vitals signs and nursing note reviewed.   Constitutional:       General: She is not in acute distress.     Appearance: She is well-developed. She is not diaphoretic.   HENT:      Head: Normocephalic and atraumatic.      Right Ear: External ear normal.      Left Ear: External ear normal.      Nose: Nose normal.   Eyes:      General: Lids are normal. No scleral icterus.        Right eye: No discharge.         Left eye: No discharge.      Conjunctiva/sclera: Conjunctivae normal.   Neck:      Musculoskeletal: Neck supple.   Pulmonary:      Effort: Pulmonary effort is normal. No accessory muscle usage or respiratory distress.      Breath sounds: Normal breath sounds and air entry. No stridor or decreased air movement. No decreased breath sounds, wheezing, rhonchi or rales.      Comments: Well auscultated clear lung sounds to all fields  Chest:       Musculoskeletal: Normal range of motion.   Skin:     General: Skin is warm and dry.      Coloration: Skin is not pale.      Findings: No erythema.   Neurological:      Mental Status: She is alert and oriented to person, place, and time. She is not disoriented.   Psychiatric:         Speech: Speech normal.         Behavior: Behavior normal.      dx CXR -   1/11/2020 4:11 PM     HISTORY/REASON FOR EXAM:  History of metastatic breast cancer.        TECHNIQUE/EXAM DESCRIPTION AND NUMBER OF VIEWS:  Two views of the chest.     COMPARISON:  11/6/2019     FINDINGS:  Indwelling catheter is identified with the tip projecting over the SVC. There are surgical clips in the left axilla.     The mediastinal and cardiac silhouette is unremarkable.     The pulmonary vascularity is within normal limits.     Lungs demonstrate an ill-defined nodular opacity in the right upper lobe measuring approximately 4.4 cm in diameter. There is also a nodule in the left " lateral lower lobe measuring 1.3 cm in diameter. There is mild bilateral interstitial opacity.     There is no significant pleural effusion.     There is no visible pneumothorax.     There are no acute bony abnormalities.     IMPRESSION:     1.  There has been interval increase in size of an ill-defined right upper lobe lung nodule. There is also a left lateral lower lobe nodule now visualized. Findings could be consistent with history of metastases.  2.  Diffuse interstitial prominence could relate to lymphangitic tumor or edema.             Last Resulted: 01/11/20  4:25 PM        Cannot see post bx CXR from [facility] -pending reviewing chest x-ray findings with patient and  they do recount no new findings from prior studies.  Comforted by full lung volumes and no significant pleural effusion found.        Assessment/Plan:   1. Cough  - DX-CHEST-2 VIEWS; Future    2. History of lung biopsy  We reviewed at length -if any worsening condition patient is to present to ER for further work-up-she is encouraged to contact her surgical team for any questions over the weekend, she is encouraged to purchase a cheap pulse oximeter to watch for any decrease oxygen values while at home.  Return to clinic with lack of resolution or progression of symptoms.  ER precautions with any worsening symptoms are reviewed with patient/caregiver and they do express understanding        Differential diagnosis, natural history, supportive care, and indications for immediate follow-up discussed.

## 2020-02-10 NOTE — PROGRESS NOTES
1. [facility] Verified: yes    2. Verify PCP: yes    3. Review and add  to Care Team: yes      5. Reviewed/Updated the following with patient:       •   Communication Preference Obtained? YES  • MyChart Activation: declined       •   E-Mail Address Obtained? YES       •   Appointment Day and Time Preferences? YES       •   Preferred Pharmacy? YES       •   Preferred Lab? YES    6. Care Gap Scheduling (Attempt to Schedule EACH Overdue Care Gap!)    Unable to review mammogram

## 2020-02-11 NOTE — PROGRESS NOTES
Linda called in regards to a bill she received for 1,100 Dos 08/17/19-08/11/19  Stated it was for hospital stay but she payed $3,400 last year.

## 2020-02-12 NOTE — PROGRESS NOTES
Linda called back to follow up and I adv her that she has not met MOP at the moment she has accumulated $545.00. She stated that she just received letter that she reached [facility] last year and would like to file an appeal. Informed her that she would have to submit through witting as the bill was from August of last year.

## 2020-06-30 NOTE — TELEPHONE ENCOUNTER
1. Caller Name: Katie Monroy                          Call Back Number: [phone] (home)     Dr. Augustine WALSH: Pt arrived in person @ office and request lab order for her blood type. Please advise.

## 2020-07-02 NOTE — TELEPHONE ENCOUNTER
VOICEMAIL  1. Caller Name: Fatmata                      Call Back Number: [phone]    2. Message: called and stated they received the zetia with the a pharmacy note stating that is was to replace the lipitor, but they also received a script for liptor so they were wondering if they were to fill both medications or just the zetia and discontinue the lipitor    3. Patient approves office to leave a detailed voicemail/MyChart message: N\A

## 2020-07-06 NOTE — TELEPHONE ENCOUNTER
Phone Number Called: [phone] (home)       Call outcome: Spoke to patient regarding message below.    Message: pt notified

## 2020-07-21 NOTE — TELEPHONE ENCOUNTER
Phone Number Called: [phone] (home)     Call outcome: Spoke to patient regarding message below.    Message: Pt. Scheduled

## 2020-07-21 NOTE — TELEPHONE ENCOUNTER
----- Message from Hailey Bradshaw M.D. sent at 7/21/2020  7:48 AM PDT -----  Please schedule appointment for 6 mo follow up and lab review.

## 2020-08-06 NOTE — PROGRESS NOTES
Telemedicine Visit: Established Patient     This encounter was conducted via GOkey.   Verbal consent was obtained. Patient's identity was verified.       Subjective:   CC: MITA Monroy is a 75 y.o. female presenting for evaluation and management of:    Pt has hx of metastatic breast cancer to the lung. She is currently working with [facility] in [place]. She has been receiving 4 cycle of Enhertu since 2/2020. She develops acute SOB over the past few weeks. She was advised by her oncologist to follow up with me for her symptoms. She is currently off Enhertu for 2 months. She is concerned for pulmonary fibrosis associated with Enhertu. She has hx of asthma, previously controlled without meds. She currently does not take any medication for asthma. She denies fever, chills, nausea, vomiting, chest pain, cough, orthopnea, unusual pedal edema.     Her last echocardiogram done in 5/2020 showed EF of 65% without regional wall motion abnormalities. PET_CT done in 7/2020 showed reduction in size and numbers of pulmonary nodules as well as mediastinal lymphadenopathy.     ROS   Denies any recent fevers or chills. No nausea or vomiting. No chest pains or shortness of breath.     Allergies   Allergen Reactions   • Bactrim [Sulfamethoxazole W-Trimethoprim]      Rash and pain   • Statins [Hmg-Coa-R Inhibitors]        Current medicines (including changes today)  Current Outpatient Medications   Medication Sig Dispense Refill   • predniSONE (DELTASONE) 20 MG Tab Take 2 Tabs by mouth every day for 5 days. 10 Tab 0   • albuterol 108 (90 Base) MCG/ACT Aero Soln inhalation aerosol Inhale 2 Puffs by mouth every 6 hours as needed for Shortness of Breath. 8.5 g 5   • Mometasone Furo-Formoterol Fum 100-5 MCG/ACT Aerosol Inhale 1 Inhalation by mouth 2 Times a Day. 13 g 5   • ezetimibe (ZETIA) 10 MG Tab Take 1 Tab by mouth every day. 90 Tab 1   • ibuprofen (MOTRIN) 200 MG Tab Take 200 mg by mouth every 6 hours as needed.     •  ALPRAZolam (XANAX) 0.25 MG Tab Take 0.25 mg by mouth at bedtime as needed for Sleep.     • amLODIPine (NORVASC) 5 MG Tab Take 1 Tab by mouth every day. 90 Tab 1   • B Complex Vitamins (VITAMIN B COMPLEX PO) Take  by mouth.     • Multiple Vitamins-Minerals (ONE-A-DAY 50 PLUS PO) Take  by mouth.     • Coenzyme Q10 (COQ-10 PO) Take 1 Tab by mouth every day.     • cyanocobalamin (VITAMIN B-12) 500 MCG Tab Take 500 mcg by mouth every day.     • vitamin D (CHOLECALCIFEROL) 1000 UNIT Tab Take 1,000 Units by mouth every day.       No current facility-administered medications for this visit.        Patient Active Problem List    Diagnosis Date Noted   • Pulmonary fibrosis (HCC) 08/07/2020   • Lung metastases (HCC) 08/07/2020   • SOB (shortness of breath) 08/07/2020   • Prediabetes 05/21/2018   • PEDRO (generalized anxiety disorder) 02/14/2018   • Dyslipidemia 02/05/2018   • Osteopenia of multiple sites 02/01/2018   • Metastatic breast carcinoma_[facility] 03/08/2017   • Primary osteoarthritis of both knees 07/08/2016   • Essential hypertension 05/25/2016   • Asthma, mild persistent 05/24/2016   • Plantar fasciitis, R 02/03/2006       Family History   Problem Relation Age of Onset   • Cancer Mother         leukemia   • Heart Disease Mother         arrhythmia   • Cancer Father         lung   • Lung Disease Father    • No Known Problems Sister    • Diabetes Neg Hx        She  has a past medical history of Arthritis, Asthma, Breast cancer (HCC), Bunion, Cancer (HCC), Cancer (HCC) (2017), Hypertension (2017), Irritable bowel syndrome, Plantar fasciitis of right foot, and Prediabetes.  She  has a past surgical history that includes other; mastectomy (Left, 3/17/2017); and node biopsy sentinel (Left, 3/17/2017).       Objective:   /69 (BP Location: Right arm, Patient Position: Sitting, BP Cuff Size: Adult) Comment: pt stated  Pulse 100 Comment: pt stated  Temp 35.9 °C (96.7 °F) (Oral) Comment: pt stated  Ht 1.626  "m (5' 4\")   Wt 60.8 kg (134 lb) Comment: pt stated  SpO2 96% Comment: pt stated  BMI 23.00 kg/m²     Physical Exam:  Constitutional: Alert, no distress, well-groomed.  Skin: No rashes in visible areas.  Eye: Round. Conjunctiva clear, lids normal. No icterus.   ENMT: Lips pink without lesions, good dentition, moist mucous membranes. Phonation normal.  Neck: No masses, no thyromegaly. Moves freely without pain.  CV: Pulse as reported by patient  Respiratory: Unlabored respiratory effort, no cough or audible wheeze  Psych: Alert and oriented x3, normal affect and mood.     Annual Health Assessment Questions:    1.  Are you currently engaging in any exercise or physical activity? No    2.  How would you describe your mood or emotional well-being today? fair    3.  Have you had any falls in the last year? No    4.  Have you noticed any problems with your balance or had difficulty walking? No    5.  In the last six months have you experienced any leakage of urine? No    6. DPA/Advanced Directive: Patient has Advanced Directive on file.       Assessment and Plan:   The following treatment plan was discussed:     1. Metastatic breast carcinoma_[facility]  2. SOB (shortness of breath)  3. Mild persistent asthma without complication  - REFERRAL TO PULMONARY AND SLEEP MEDICINE General Pulmonary  - DX-CHEST-2 VIEWS; Future  - predniSONE (DELTASONE) 20 MG Tab; Take 2 Tabs by mouth every day for 5 days.  Dispense: 10 Tab; Refill: 0  - albuterol 108 (90 Base) MCG/ACT Aero Soln inhalation aerosol; Inhale 2 Puffs by mouth every 6 hours as needed for Shortness of Breath.  Dispense: 8.5 g; Refill: 5  - Mometasone Furo-Formoterol Fum 100-5 MCG/ACT Aerosol; Inhale 1 Inhalation by mouth 2 Times a Day.  Dispense: 13 g; Refill: 5  - strict ER precaution discussed.     4. Essential hypertension  Chronic, controlled with Amlodipine 5 mg qd, no s/e reported, will continue.    - Pt was counseled on lifestyle modification    "       Follow-up: Return for As needed.

## 2020-08-07 PROBLEM — R06.02 SOB (SHORTNESS OF BREATH): Status: ACTIVE | Noted: 2020-01-01

## 2020-08-07 PROBLEM — J84.10 PULMONARY FIBROSIS (HCC): Status: ACTIVE | Noted: 2020-01-01

## 2020-08-07 PROBLEM — C78.00 LUNG METASTASES: Status: ACTIVE | Noted: 2020-01-01

## 2020-08-10 NOTE — TELEPHONE ENCOUNTER
Phone Number Called: [phone] (home)       Call outcome: Spoke to patient regarding message below.    Message: pt notified

## 2020-08-10 NOTE — TELEPHONE ENCOUNTER
FINAL PRIOR AUTHORIZATION STATUS:    1.  Name of Medication & Dose: Dulera 100-5 mcg     2. Prior Auth Status: Approved through 8/5/2021     3. Action Taken: Pharmacy Notified: no Patient Notified: no

## 2020-08-10 NOTE — TELEPHONE ENCOUNTER
DOCUMENTATION OF PAR STATUS:    1. Name of Medication & Dose: Dulera 100-5 mcg/act aerosol     2. Name of Prescription Coverage Company & phone #: [employer]    3. Date Prior Auth Submitted: 08/10/20      4. What information was given to obtain insurance decision? ICD-10 code    5. Prior Auth Status? Pending    6. Patient Notified: no

## 2020-09-01 NOTE — PATIENT INSTRUCTIONS
This lady comes in for evaluation of possible interstitial pneumonitis, is currently on chemotherapy for breast cancer metastatic to lung.  I reviewed her scans and images, she has had a response to the most recent chemotherapy.  However there is a concerned that this may be triggering some pulmonary fibrosis or interstitial pneumonitis.  She is currently on Enherto  I reviewed her recent images, they brought with them x-rays, we have those available on file and they do show a coarse reticular pattern.  She has cough without sputum mucus or hemoptysis.  It is unclear if this is a reaction to the chemotherapy agent, she is on a current holiday from that medication as there was concerned that it was triggering cough and possible pneumonitis.  I have ordered a high resolution CAT scan as well as lung function testing and oxygen testing to see if this is an active process, high resolution CAT scan would show groundglass or pneumonitis, hopefully this is fibrotic and not active and is burnt out but that remains to be determined.  In the meantime she is seeing her oncologist tomorrow, Dr. Dustin Yost, and she plans to ask him to hold off on resuming the current chemotherapy as she feels it is triggering her symptoms.  We will arrange for the imaging, lung function testing and then follow-up.    In the meantime she did have prior asthma, from [place], and was using Dulera and albuterol.  A short course of prednisone was given recently.  She has Dulera which is somewhat out of date, is worried about the expense, I did update her prescription in the event that this is beneficial for her, and explained that Dulera should be used morning and night rinse and gargle after and that she has to use it as a maintenance medication with the albuterol as a rescue medication for the cough.  We will see her back once the studies are accomplished

## 2020-09-01 NOTE — PROGRESS NOTES
Katie Monroy is a 75 y.o. female here for interstitial pneumonitis on chemotherapy for metastatic breast cancer to lung. Patient was referred by her primary care and oncologist.    History of Present Illness:      This lady comes in for evaluation of possible interstitial pneumonitis, is currently on chemotherapy for breast cancer metastatic to lung.  I reviewed her scans and images, she has had a response to the most recent chemotherapy.  However there is a concerned that this may be triggering some pulmonary fibrosis or interstitial pneumonitis.  She is currently on Enherto  I reviewed her recent images, they brought with them x-rays, we have those available on file and they do show a coarse reticular pattern.  She has cough without sputum mucus or hemoptysis.  It is unclear if this is a reaction to the chemotherapy agent, she is on a current holiday from that medication as there was concerned that it was triggering cough and possible pneumonitis.  I have ordered a high resolution CAT scan as well as lung function testing and oxygen testing to see if this is an active process, high resolution CAT scan would show groundglass or pneumonitis, hopefully this is fibrotic and not active and is burnt out but that remains to be determined.  In the meantime she is seeing her oncologist tomorrow, Dr. Dustin Yost, and she plans to ask him to hold off on resuming the current chemotherapy as she feels it is triggering her symptoms.  We will arrange for the imaging, lung function testing and then follow-up.    In the meantime she did have prior asthma, from [place], and was using Dulera and albuterol.  A short course of prednisone was given recently.  She has Dulera which is somewhat out of date, is worried about the expense, I did update her prescription in the event that this is beneficial for her, and explained that Dulera should be used morning and night rinse and gargle after and that she has to use it as a  maintenance medication with the albuterol as a rescue medication for the cough.  We will see her back once the studies are accomplished    Constitutional ROS: No unexpected change in weight, No unexplained fevers  Eyes: No change in vision or blurring or double vision  Mouth/Throat ROS: No sore throat, No recent change in voice or hoarseness  Pulmonary ROS: See present history for pertinent positives  Cardiovascular ROS: No chest pain to suggest acute coronary syndrome  Gastrointestinal ROS: No abdominal pain to suggest peptic disease  Musculoskeletal/Extremities ROS: no acute artritis or unusual swelling; does have a fatty tumor on her back which may need to be addressed in the future  Hematologic/Lymphatic ROS: No easy bleeding or unusual lymph node swelling  Neurologic ROS: No new or unusual weakness  Allergic/Immunologic: No  urticaria or allergic rash      Current Outpatient Medications   Medication Sig Dispense Refill   • mupirocin (BACTROBAN) 2 % Ointment      • Mometasone Furo-Formoterol Fum 100-5 MCG/ACT Aerosol Inhale 1 Inhalation by mouth 2 Times a Day. 13 g 5   • albuterol 108 (90 Base) MCG/ACT Aero Soln inhalation aerosol Inhale 2 Puffs by mouth every 6 hours as needed for Shortness of Breath. 8.5 g 5   • ezetimibe (ZETIA) 10 MG Tab Take 1 Tab by mouth every day. 90 Tab 1   • ibuprofen (MOTRIN) 200 MG Tab Take 200 mg by mouth every 6 hours as needed.     • ALPRAZolam (XANAX) 0.25 MG Tab Take 0.25 mg by mouth at bedtime as needed for Sleep.     • amLODIPine (NORVASC) 5 MG Tab Take 1 Tab by mouth every day. 90 Tab 1   • B Complex Vitamins (VITAMIN B COMPLEX PO) Take  by mouth.     • Multiple Vitamins-Minerals (ONE-A-DAY 50 PLUS PO) Take  by mouth.     • Coenzyme Q10 (COQ-10 PO) Take 1 Tab by mouth every day.     • cyanocobalamin (VITAMIN B-12) 500 MCG Tab Take 500 mcg by mouth every day.     • vitamin D (CHOLECALCIFEROL) 1000 UNIT Tab Take 1,000 Units by mouth every day.       No current  "facility-administered medications for this visit.        Social History     Tobacco Use   • Smoking status: Never Smoker   • Smokeless tobacco: Never Used   Substance Use Topics   • Alcohol use: Not Currently     Alcohol/week: 0.0 oz   • Drug use: No        Past Medical History:   Diagnosis Date   • Arthritis     osteo   • Asthma     does not use inhalers   • Breast cancer (HCC)    • Bunion     both feet   • Cancer (HCC)     skin   • Cancer (HCC) 2017    breast   • Hypertension 2017    pt states well controlled on meds   • Irritable bowel syndrome    • Plantar fasciitis of right foot    • Prediabetes        Past Surgical History:   Procedure Laterality Date   • MASTECTOMY Left 3/17/2017    Procedure: MASTECTOMY - PARTIAL, WIRE LOCALIZED;  Surgeon: Kayt Gastelum M.D.;  Location: SURGERY [facility];  Service:    • NODE BIOPSY SENTINEL Left 3/17/2017    Procedure: NODE BIOPSY SENTINEL;  Surgeon: Katy Gastelum M.D.;  Location: SURGERY [facility];  Service:    • OTHER      right ovary removed       Allergies: Bactrim [sulfamethoxazole w-trimethoprim] and Statins [hmg-coa-r inhibitors]    Family History   Problem Relation Age of Onset   • Cancer Mother         leukemia   • Heart Disease Mother         arrhythmia   • Cancer Father         lung   • Lung Disease Father    • No Known Problems Sister    • Diabetes Neg Hx        Physical Examination    Vitals:    09/01/20 0940   Height: 1.626 m (5' 4\")   Weight: 65.3 kg (144 lb)   Weight % change since last entry.: 0 %   BP: 112/80   Pulse: 76   BMI (Calculated): 24.72   Resp: 14   Temp: 36.4 °C (97.6 °F)   TempSrc: Temporal       General Appearance: alert, no distress  Skin: Skin color, texture, turgor normal. No rashes or lesions.  Eyes: negative  Oropharynx: Lips, mucosa, and tongue normal. Teeth and gums normal. Oropharynx moist and without lesion  Lungs: positive findings: Does have minimal basilar crackles, without prolonged phases or wheezes  Heart: " negative. RRR without murmur, gallop, or rubs.  No ectopy.  Abdomen: Abdomen soft, non-tender. . No masses,  No organomegaly  Extremities:  No deformities, edema, or skin discoloration  Joints: No acute arthritis  Peripheral Pulses:perfused  Neurologic: intact grossly  No clubbing or cyanosis; she describes lymphedema left upper extremity related to her prior breast cancer and radiation      Imaging: High resolution CAT scan ordered    PFTS: Ordered      Assessment and Plan  1. Pulmonary fibrosis (HCC)  Unclear if this is active pneumonitis or scarring  - PULMONARY FUNCTION TESTS -Test requested: Complete Pulmonary Function Test; Future  - Exercise Test for Bronchospasm / 6-Minute Walk; Future    2. Metastatic breast carcinoma_[facility]   Dr. Dustin Yost    3. Malignant neoplasm metastatic to lung, unspecified laterality (HCC)    4. Mild persistent asthma without complication    - Mometasone Furo-Formoterol Fum 100-5 MCG/ACT Aerosol; Inhale 1 Inhalation by mouth 2 Times a Day.  Dispense: 13 g; Refill: 5  - albuterol 108 (90 Base) MCG/ACT Aero Soln inhalation aerosol; Inhale 2 Puffs by mouth every 6 hours as needed for Shortness of Breath.  Dispense: 8.5 g; Refill: 5    5. SOB (shortness of breath)  Multifactorial, interstitial process and reactive airways  - PULMONARY FUNCTION TESTS -Test requested: Complete Pulmonary Function Test; Future  - Exercise Test for Bronchospasm / 6-Minute Walk; Future    6. Interstitial pulmonary disease (HCC)  Activity to be determined  - CT-CHEST, HIGH RESOLUTION LUNG; Future  - PULMONARY FUNCTION TESTS -Test requested: Complete Pulmonary Function Test; Future  - Exercise Test for Bronchospasm / 6-Minute Walk; Future      Followup Return in about 6 weeks (around 10/13/2020) for follow up visit with Dr. Pippa Mg.

## 2020-09-02 NOTE — TELEPHONE ENCOUNTER
Received a call from radiology scheduling Antoinette Smith EXT#[id number] (or e-mail) or [phone] Patient insisting on getting her CT scheduled for tomorrow BUT your order states schedule after 10/1/20.    Meanwhile the patient has also called and I let her know we had already received a call from radiology and I would speak to Dr Mg regarding this issue of scheduling.    Patient phone number [phone]

## 2020-09-02 NOTE — TELEPHONE ENCOUNTER
I called Radiology and spoke to Yanique and she did call patient and Katie is scheduled for CT on 9/3/20.    Earlier scheduling Issue is resolved.

## 2020-09-04 PROBLEM — J84.9 INTERSTITIAL LUNG DISEASE (HCC): Status: ACTIVE | Noted: 2020-01-01

## 2020-09-04 PROBLEM — R06.02 SOB (SHORTNESS OF BREATH): Status: RESOLVED | Noted: 2020-01-01 | Resolved: 2020-01-01

## 2020-09-18 NOTE — PROCEDURES
Test on Room Air.  Interpretation;  There was significant desaturation from 96% to 88% at the onset of the study however patient did not require supplemental oxygen and recovered to 91% suggesting some initial hypoventilation.  This test does not clearly support supplemental oxygen with activity.  Distance walked was 800 feet or 56% predicted suggesting some exercise intolerance.

## 2020-09-18 NOTE — PROCEDURES
Technician: Kiara Leach RRT   Good/fair patient effort & cooperation. FVCs were repeatable, but efforts were variable and pt had a hard time with max exhalations. Pt was unable to perform DLCO due to coughing on the breath hold.  The results of this test meet the ATS/ERS standards for acceptability & reproducibility.  Test was performed on the Solexa Body Plethysmograph-Elite DX system.  Predicted equations for Spirometry are GLI-2012, ITS for lung volumes, and GLI-2017 for DLCO.  The DLCO was uncorrected for Hgb.  A bronchodilator of Ventolin HFA -2puffs via spacer administered.  DLCO performed during dilation period.    Interpretation;   1.  Baseline spirometry shows normal airflows.  2.  There is no significant bronchodilator response.  3.  Total lung capacity is within normal limits at 4.17 L or 85% predicted.  4.  DLCO maneuver was not performed.    Pulmonary function testing shows normal airflows although FVC is mildly reduced compared to FEV1 which may suggest early restriction.  Flow volume loop is abnormal which may be related to patient difficulty with test as reported but would consider vocal cord dysfunction.

## 2020-09-29 NOTE — PROGRESS NOTES
Subjective:   CC: acute abdominal pain     HPI:     Katie Monroy is a 75 y.o. female, established patient of the clinic, presents with the following concerns:     Pt presents with development of acute intermittent LUQ pain for 4 weeks. Pain is described as sharp, radiates to the L mid back, nothing makes it worse, lying down sometimes makes it better. She denies fever, chills, nausea, vomiting, constipation, diarrhea, hematochezia, melena, bowel habit changes, skin rash. Symptoms do not change with meal. She has hx of metastatic breast cancer to the lung. She was on chemotherapy but it was temporarily discontinued in 6/2020 due to development of SOB, pulmonary fibrosis. She is using inhalers as directed by Pulm. She saw her Pulmonologist this morning. Supplemental oxygen recommended. She will f/u with oncology in 10/2020. She has CT scan of the abdomen ordered and scheduled in 2 days.  Pt has been taking 4 ibuprofen per day. Tylenol does not work. Pain can be severe at times. However, she does not have any pain during OV today.     Current medicines (including changes today)  Current Outpatient Medications   Medication Sig Dispense Refill   • tramadol (ULTRAM) 50 MG Tab Take 1 Tab by mouth every 8 hours as needed for Moderate Pain or Severe Pain for up to 10 days. 30 Tab 0   • Mometasone Furo-Formoterol Fum 100-5 MCG/ACT Aerosol Inhale 1 Inhalation by mouth 2 Times a Day. 13 g 5   • albuterol 108 (90 Base) MCG/ACT Aero Soln inhalation aerosol Inhale 2 Puffs by mouth every 6 hours as needed for Shortness of Breath. 8.5 g 5   • ezetimibe (ZETIA) 10 MG Tab Take 1 Tab by mouth every day. 90 Tab 1   • ibuprofen (MOTRIN) 200 MG Tab Take 200 mg by mouth every 6 hours as needed.     • ALPRAZolam (XANAX) 0.25 MG Tab Take 0.25 mg by mouth at bedtime as needed for Sleep.     • amLODIPine (NORVASC) 5 MG Tab Take 1 Tab by mouth every day. 90 Tab 1   • B Complex Vitamins (VITAMIN B COMPLEX PO) Take  by mouth.     • Multiple  "Vitamins-Minerals (ONE-A-DAY 50 PLUS PO) Take  by mouth.     • Coenzyme Q10 (COQ-10 PO) Take 1 Tab by mouth every day.     • cyanocobalamin (VITAMIN B-12) 500 MCG Tab Take 500 mcg by mouth every day.     • vitamin D (CHOLECALCIFEROL) 1000 UNIT Tab Take 1,000 Units by mouth every day.       No current facility-administered medications for this visit.      She  has a past medical history of Arthritis, Asthma, Back pain, Breast cancer (HCC), Bronchitis, Bunion, Cancer (HCC), Cancer (HCC) (2017), Chickenpox, Hypertension (2017), Irritable bowel syndrome, Painful breathing, Plantar fasciitis of right foot, Prediabetes, Shortness of breath, Tonsillitis, Wears glasses, and Wheezing.    I personally reviewed patient's problem list, allergies, medications, family hx, social hx with patient and update EPIC.     REVIEW OF SYSTEMS:  CONSTITUTIONAL:  Denies night sweats, fatigue, malaise, lethargy, fever or chills.  RESPIRATORY:  Denies cough, wheeze, hemoptysis, or shortness of breath.  CARDIOVASCULAR:  Denies chest pains, palpitations, pedal edema     Objective:     /66 (BP Location: Right arm, Patient Position: Sitting, BP Cuff Size: Adult)   Pulse (!) 107   Temp 36.6 °C (97.9 °F) (Temporal)   Ht 1.6 m (5' 3\")   Wt 65.4 kg (144 lb 2.9 oz)   SpO2 94%  Body mass index is 25.54 kg/m².    Physical Exam:  Constitutional: awake, alert, in no distress.  Skin: Warm, dry, good turgor, no rashes, bruises, ulcers in visible areas.  Eye: conjunctiva clear, lids neg for edema or lesions.  Neck: Trachea midline, no masses, no thyromegaly. No cervical or supraclavicular lymphadenopathy  Respiratory: Unlabored respiratory effort, lungs clear to auscultation, no wheezes, no rales.  Cardiovascular: Normal S1, S2, no murmur, no pedal edema.  Abdomen: Soft, non-tender to palpation, active BS, no hernia, no hepatosplenomegaly, negative rebound or guarding.   Psych: Oriented x3, affect and mood wnl, intact judgement and insight. "       Assessment and Plan:   The following treatment plan was discussed    1. Acute LUQ pain  - abdominal CT   - avoidance of excessive consumption of NSAID.   - tramadol (ULTRAM) 50 MG Tab; Take 1 Tab by mouth every 8 hours as needed for Moderate Pain or Severe Pain for up to 10 days.  Dispense: 30 Tab; Refill: 0  - Consent for Opiate Prescription  - Risks, benefits, side effects, as well as potential health complications associated with Tramadol discussed with patient. Appropriate counseling provided.      2. Metastatic breast carcinoma_[facility]  3. Malignant neoplasm metastatic to lung, unspecified laterality (HCC)  4. Interstitial lung disease_Dr. Mg  5. Pulmonary fibrosis_Dr. Mg  Pt has metastatic breast cancer to the lung. She was on chemotherapy managed by [facility] oncology. Unfortunately, she develops SOB and was diagnosed with pulmonary fibrosis. She is working with Dr. Ma (Pul). Chemo was temporarily discontinued since 6/2020 due to lung condition. She will f/u with oncology in 10/2020. She is taking Dulara and Albuterol as directed by Pulm. Supplemental oxygen is ordered by her Pulmonologist.   - cont Dulara and Albuterol as directed  - f/u with Pulm and Oncology.     6. Essential hypertension  Chronic, controlled with Amlodipine 5 mg qd, no s/e reported, will continue.    - Comp Metabolic Panel; Future  - MICROALBUMIN CREAT RATIO URINE; Future  - recommended dietary modification, exercise, and weight loss.   - avoid alcohol, drugs, tobacco products     7. Dyslipidemia  - continue Zetia 10 mg qd.   - Pt was counseled on lifestyle modification     - Lipid Profile; Future    8. Prediabetes  - Pt was counseled on dietary modification, weight loss   - Recommended moderate intensity exercise at least 30 minutes per day x 5 days per week.  - Follow up in 6 months.   - HEMOGLOBIN A1C; Future     Ly JOSEPH Bradshaw M.D.     Patient was seen for 40 minutes face to face of which greater than  50% of appointment time was spent on counseling and coordination of care regarding the above       Followup: Return in about 6 months (around 3/29/2021) for Multiple issues.    Please note that this dictation was created using voice recognition software. I have made every reasonable attempt to correct obvious errors, but I expect that there are errors of grammar and possibly content that I did not discover before finalizing the note.

## 2020-09-29 NOTE — LETTER
Coco Ma M.D.  [facility]   [address],   [facility] [place] [address]  Phone: [phone] - Fax: [phone]           Encounter Date: 9/29/2020  Provider: Coco Ma M.D.  Location of Care: [facility] PULMONARY MEDICINE  [id number]      Patient:   Katie Monroy   MR Number: [id number]   YOB: 1945     PROGRESS NOTE:  Chief Complaint   Patient presents with   • Follow-Up     last seen 9/1/2020 Dr. Mg    • Results     CPFT 60, 6 MW 9/18/220, HRCT 9/3/2020          HPI: This patient is a 75 y.o. female whom is followed in our clinic for ILD last seen by Dr. Mg for initial consult on 9/1/20. The pt has a hx of breast Ca dx. Initially in 2017 tx with XRT and lumpectomy. She then had recurrence in 5/2019 found incidentally when she had imaging done to evaluate for injuries after a mechanical fall. She had pulmonary metastasis at the time confirmed by lung bx. She was tx with 2 different chemo regimens for Her2+, ER/VA- recurrent, metastatic lung Ca with progression of disease and most recently was put on Enhertu in February due to intolerance of capcetabine. Over the past several months the pt has had progressive SOB of cough and she has demonstrated pneumonitis with bibasilar GGO on imaging concerning for chemotherapy effects thus was referred to us. Her Enhertu was stopped temporarily and it has been reported to be associated with rare ILD/pulmonary fibrosis however the pt has CT chest from [facility] dating as far back as 9/2019 prior to this agent reporting ILD in b/l bases. I do not have these images to compare directly however her most recent HRCT ordered after her first apt w/us shows fibrotic NSIP vs. Acute pneumonitis on a pattern of UIP. She has been tx for RAD in the past and did complete a short course of steroids for her cough with no change in sxs. PFTs done 9/18 showed desaturation to 87% with ambulation and normal  airflows, no BD response, normal TLC and unfortunately DLCO could not be obtained and her inspiratory flow volume loop was mildly truncated. She is also c/o focal, L-sided, anterior rib pain that is intermittent, sharp and sometimes dull and response to anti-inflammatory agents. No f/c. No nt sweats or weight loss. She is a life-long non-smoker. No family hx of CTD.     Past Medical History:   Diagnosis Date   • Arthritis     osteo   • Asthma     does not use inhalers   • Back pain    • Breast cancer (HCC)    • Bronchitis    • Bunion     both feet   • Cancer (HCC)     skin   • Cancer (HCC) 2017    breast   • Chickenpox    • Hypertension 2017    pt states well controlled on meds   • Irritable bowel syndrome    • Painful breathing    • Plantar fasciitis of right foot    • Prediabetes    • Shortness of breath    • Tonsillitis    • Wears glasses    • Wheezing        Social History     Socioeconomic History   • Marital status:      Spouse name: Not on file   • Number of children: Not on file   • Years of education: Not on file   • Highest education level: Not on file   Occupational History   • Occupation: retired-  , [employer]   Social Needs   • Financial resource strain: Hard   • Food insecurity     Worry: Not on file     Inability: Not on file   • Transportation needs     Medical: Not on file     Non-medical: Not on file   Tobacco Use   • Smoking status: Never Smoker   • Smokeless tobacco: Never Used   Substance and Sexual Activity   • Alcohol use: Not Currently     Alcohol/week: 0.0 oz   • Drug use: No   • Sexual activity: Yes     Partners: Male   Lifestyle   • Physical activity     Days per week: Not on file     Minutes per session: Not on file   • Stress: Not on file   Relationships   • Social connections     Talks on phone: Not on file     Gets together: Not on file     Attends [employer] service: Not on file     Active member of club or organization: Not on file     Attends meetings of  clubs or organizations: Not on file     Relationship status: Not on file   • Intimate partner violence     Fear of current or ex partner: Not on file     Emotionally abused: Not on file     Physically abused: Not on file     Forced sexual activity: Not on file   Other Topics Concern   • Not on file   Social History Narrative    Nam[place]ian.     .     2 daughters.        Family History   Problem Relation Age of Onset   • Cancer Mother         leukemia   • Heart Disease Mother         arrhythmia   • Cancer Father         lung   • Lung Disease Father    • No Known Problems Sister    • Diabetes Neg Hx        Current Outpatient Medications on File Prior to Visit   Medication Sig Dispense Refill   • Mometasone Furo-Formoterol Fum 100-5 MCG/ACT Aerosol Inhale 1 Inhalation by mouth 2 Times a Day. 13 g 5   • albuterol 108 (90 Base) MCG/ACT Aero Soln inhalation aerosol Inhale 2 Puffs by mouth every 6 hours as needed for Shortness of Breath. 8.5 g 5   • ezetimibe (ZETIA) 10 MG Tab Take 1 Tab by mouth every day. 90 Tab 1   • ibuprofen (MOTRIN) 200 MG Tab Take 200 mg by mouth every 6 hours as needed.     • ALPRAZolam (XANAX) 0.25 MG Tab Take 0.25 mg by mouth at bedtime as needed for Sleep.     • B Complex Vitamins (VITAMIN B COMPLEX PO) Take  by mouth.     • Multiple Vitamins-Minerals (ONE-A-DAY 50 PLUS PO) Take  by mouth.     • Coenzyme Q10 (COQ-10 PO) Take 1 Tab by mouth every day.     • cyanocobalamin (VITAMIN B-12) 500 MCG Tab Take 500 mcg by mouth every day.     • vitamin D (CHOLECALCIFEROL) 1000 UNIT Tab Take 1,000 Units by mouth every day.     • amLODIPine (NORVASC) 5 MG Tab Take 1 Tab by mouth every day. 90 Tab 1     No current facility-administered medications on file prior to visit.        Bactrim [sulfamethoxazole w-trimethoprim] and Statins [hmg-coa-r inhibitors]      ROS:   Review of Systems   Constitutional: Negative for chills, diaphoresis, fever, malaise/fatigue and weight loss.   HENT: Negative for  "congestion, ear discharge, ear pain, hearing loss, nosebleeds, sinus pain, sore throat and tinnitus.    Eyes: Negative for blurred vision, double vision, photophobia, pain, discharge and redness.   Respiratory: Positive for cough and shortness of breath. Negative for hemoptysis, sputum production, wheezing and stridor.    Cardiovascular: Positive for chest pain. Negative for palpitations, orthopnea, claudication, leg swelling and PND.   Gastrointestinal: Negative for abdominal pain, constipation, diarrhea, heartburn, nausea and vomiting.   Genitourinary: Negative for dysuria and urgency.   Musculoskeletal: Positive for back pain. Negative for falls, joint pain, myalgias and neck pain.   Skin: Negative for itching and rash.   Neurological: Negative for dizziness, tremors, speech change, focal weakness, weakness and headaches.   Endo/Heme/Allergies: Negative for environmental allergies.   Psychiatric/Behavioral: Negative for depression.       /74 (BP Location: Left arm, Patient Position: Sitting, BP Cuff Size: Adult)   Pulse 99   Temp 36.4 °C (97.5 °F) (Temporal)   Resp 16   Ht 1.6 m (5' 3\")   Wt 64.9 kg (143 lb)   SpO2 94%   Physical Exam  Vitals signs reviewed.   Constitutional:       General: She is not in acute distress.     Appearance: Normal appearance. She is well-developed and normal weight.   HENT:      Head: Normocephalic and atraumatic.      Right Ear: External ear normal.      Left Ear: External ear normal.      Nose: Nose normal. No congestion.      Mouth/Throat:      Mouth: Mucous membranes are moist.      Pharynx: Oropharynx is clear. No oropharyngeal exudate.   Eyes:      General: No scleral icterus.     Conjunctiva/sclera: Conjunctivae normal.      Pupils: Pupils are equal, round, and reactive to light.   Neck:      Musculoskeletal: Normal range of motion and neck supple.      Vascular: No JVD.      Trachea: No tracheal deviation.   Cardiovascular:      Rate and Rhythm: Normal rate and " regular rhythm.      Heart sounds: Normal heart sounds. No murmur. No friction rub. No gallop.    Pulmonary:      Effort: Pulmonary effort is normal. No accessory muscle usage or respiratory distress.      Breath sounds: No wheezing or rales.      Comments: Bilateral dry inspiratory crackles heard 50% up posterior lung fields.  Abdominal:      General: There is no distension.      Palpations: Abdomen is soft.      Tenderness: There is no abdominal tenderness.   Musculoskeletal: Normal range of motion.         General: Tenderness present. No deformity.      Right lower leg: No edema.      Left lower leg: No edema.      Comments: Point tenderness to palpation in the left anterior lower rib no palpated abnormality   Lymphadenopathy:      Cervical: No cervical adenopathy.   Skin:     General: Skin is warm and dry.      Findings: No rash.      Nails: There is no clubbing.     Neurological:      Mental Status: She is alert and oriented to person, place, and time.      Cranial Nerves: No cranial nerve deficit.      Gait: Gait normal.   Psychiatric:         Mood and Affect: Mood normal.         Behavior: Behavior normal.         PFTs as reviewed by me personally: as per hPI    Imaging as reviewed by me personally:  As per hPI    Assessment:  1. Interstitial pulmonary disease (HCC)  DME O2 New Set Up    JOVITA IGG RENAN W/RFLX TO JOVITA IGG IFA    SSA 52 AND 60 (RO)(MICHAEL) AB, IGG    SYSTEMIC SCLEROSIS PANEL    RHEUMATOID ARTHRITIS FACTOR    CCP-CYCLIC CITRULLINATED PEPTID   2. Metastatic breast carcinoma_[facility]  [id number] (W/O CXR)   3. Chest pain, unspecified type  [id number] (W/O CXR)   4. Respiratory failure with hypoxia, unspecified chronicity (HCC)         Plan:  1. This is a new dx for the pt but not a new finding since starting Enhertu based on CT reports from 9/19 ad 12/19 although I cannot say if there has been significant change/progression since starting Enhertu therefore to be safe I would  recommend we continue to hold this agent. We will request images be pushed for direct comparison. I will screen for CTD and we will get her started on supplemental O2 with activity. I would like to hold off on steroids until I can view images from [place] and screen for CTD however I think a trial of high-dose steroids for the pneumonitis may be warranted given new respiratory failure.  2. See above. I cannot say for sure that her pulmonary findings were exacerbated by chemotherapy however unlikely caused by it given chronic changes were appreciated prior to Enhertu. W/U as per above.   3. This is very focal and TTP therefore unlikely to be PE. She has pulmonary mets but no bony mets that I am aware of. I have ordered rib films. Okay to continue prn NSAIDs.  4. New dx for pt and likely 2/2 chronic interstitial process with possible acute component. As per above we will start supplemental O2 and consider systemic steroids. Short interval f/u.   Return in about 3 weeks (around 10/20/2020).          Electronically signed by Coco Ma M.D.  on 09/29/20    Dustin Yost M.D.  [address]  [facility] [address]  Via Fax: [phone]

## 2020-09-29 NOTE — PROGRESS NOTES
Chief Complaint   Patient presents with   • Follow-Up     last seen 9/1/2020 Dr. Mg    • Results     CPFT 60, 6 MW 9/18/220, HRCT 9/3/2020          HPI: This patient is a 75 y.o. female whom is followed in our clinic for ILD last seen by Dr. Mg for initial consult on 9/1/20. The pt has a hx of breast Ca dx. Initially in 2017 tx with XRT and lumpectomy. She then had recurrence in 5/2019 found incidentally when she had imaging done to evaluate for injuries after a mechanical fall. She had pulmonary metastasis at the time confirmed by lung bx. She was tx with 2 different chemo regimens for Her2+, ER/[place]- recurrent, metastatic lung Ca with progression of disease and most recently was put on Enhertu in February due to intolerance of capcetabine. Over the past several months the pt has had progressive SOB of cough and she has demonstrated pneumonitis with bibasilar GGO on imaging concerning for chemotherapy effects thus was referred to us. Her Enhertu was stopped temporarily and it has been reported to be associated with rare ILD/pulmonary fibrosis however the pt has CT chest from [facility] dating as far back as 9/2019 prior to this agent reporting ILD in b/l bases. I do not have these images to compare directly however her most recent HRCT ordered after her first apt w/us shows fibrotic NSIP vs. Acute pneumonitis on a pattern of UIP. She has been tx for RAD in the past and did complete a short course of steroids for her cough with no change in sxs. PFTs done 9/18 showed desaturation to 87% with ambulation and normal airflows, no BD response, normal TLC and unfortunately DLCO could not be obtained and her inspiratory flow volume loop was mildly truncated. She is also c/o focal, L-sided, anterior rib pain that is intermittent, sharp and sometimes dull and response to anti-inflammatory agents. No f/c. No nt sweats or weight loss. She is a life-long non-smoker. No family hx of CTD.     Past Medical History:    Diagnosis Date   • Arthritis     osteo   • Asthma     does not use inhalers   • Back pain    • Breast cancer (HCC)    • Bronchitis    • Bunion     both feet   • Cancer (HCC)     skin   • Cancer (HCC) 2017    breast   • Chickenpox    • Hypertension 2017    pt states well controlled on meds   • Irritable bowel syndrome    • Painful breathing    • Plantar fasciitis of right foot    • Prediabetes    • Shortness of breath    • Tonsillitis    • Wears glasses    • Wheezing        Social History     Socioeconomic History   • Marital status:      Spouse name: Not on file   • Number of children: Not on file   • Years of education: Not on file   • Highest education level: Not on file   Occupational History   • Occupation: retired-  , [employer]   Social Needs   • Financial resource strain: Hard   • Food insecurity     Worry: Not on file     Inability: Not on file   • Transportation needs     Medical: Not on file     Non-medical: Not on file   Tobacco Use   • Smoking status: Never Smoker   • Smokeless tobacco: Never Used   Substance and Sexual Activity   • Alcohol use: Not Currently     Alcohol/week: 0.0 oz   • Drug use: No   • Sexual activity: Yes     Partners: Male   Lifestyle   • Physical activity     Days per week: Not on file     Minutes per session: Not on file   • Stress: Not on file   Relationships   • Social connections     Talks on phone: Not on file     Gets together: Not on file     Attends Christian service: Not on file     Active member of club or organization: Not on file     Attends meetings of clubs or organizations: Not on file     Relationship status: Not on file   • Intimate partner violence     Fear of current or ex partner: Not on file     Emotionally abused: Not on file     Physically abused: Not on file     Forced sexual activity: Not on file   Other Topics Concern   • Not on file   Social History Narrative    Colombian.     .     2 daughters.        Family History    Problem Relation Age of Onset   • Cancer Mother         leukemia   • Heart Disease Mother         arrhythmia   • Cancer Father         lung   • Lung Disease Father    • No Known Problems Sister    • Diabetes Neg Hx        Current Outpatient Medications on File Prior to Visit   Medication Sig Dispense Refill   • Mometasone Furo-Formoterol Fum 100-5 MCG/ACT Aerosol Inhale 1 Inhalation by mouth 2 Times a Day. 13 g 5   • albuterol 108 (90 Base) MCG/ACT Aero Soln inhalation aerosol Inhale 2 Puffs by mouth every 6 hours as needed for Shortness of Breath. 8.5 g 5   • ezetimibe (ZETIA) 10 MG Tab Take 1 Tab by mouth every day. 90 Tab 1   • ibuprofen (MOTRIN) 200 MG Tab Take 200 mg by mouth every 6 hours as needed.     • ALPRAZolam (XANAX) 0.25 MG Tab Take 0.25 mg by mouth at bedtime as needed for Sleep.     • B Complex Vitamins (VITAMIN B COMPLEX PO) Take  by mouth.     • Multiple Vitamins-Minerals (ONE-A-DAY 50 PLUS PO) Take  by mouth.     • Coenzyme Q10 (COQ-10 PO) Take 1 Tab by mouth every day.     • cyanocobalamin (VITAMIN B-12) 500 MCG Tab Take 500 mcg by mouth every day.     • vitamin D (CHOLECALCIFEROL) 1000 UNIT Tab Take 1,000 Units by mouth every day.     • amLODIPine (NORVASC) 5 MG Tab Take 1 Tab by mouth every day. 90 Tab 1     No current facility-administered medications on file prior to visit.        Bactrim [sulfamethoxazole w-trimethoprim] and Statins [hmg-coa-r inhibitors]      ROS:   Review of Systems   Constitutional: Negative for chills, diaphoresis, fever, malaise/fatigue and weight loss.   HENT: Negative for congestion, ear discharge, ear pain, hearing loss, nosebleeds, sinus pain, sore throat and tinnitus.    Eyes: Negative for blurred vision, double vision, photophobia, pain, discharge and redness.   Respiratory: Positive for cough and shortness of breath. Negative for hemoptysis, sputum production, wheezing and stridor.    Cardiovascular: Positive for chest pain. Negative for palpitations,  "orthopnea, claudication, leg swelling and PND.   Gastrointestinal: Negative for abdominal pain, constipation, diarrhea, heartburn, nausea and vomiting.   Genitourinary: Negative for dysuria and urgency.   Musculoskeletal: Positive for back pain. Negative for falls, joint pain, myalgias and neck pain.   Skin: Negative for itching and rash.   Neurological: Negative for dizziness, tremors, speech change, focal weakness, weakness and headaches.   Endo/Heme/Allergies: Negative for environmental allergies.   Psychiatric/Behavioral: Negative for depression.       /74 (BP Location: Left arm, Patient Position: Sitting, BP Cuff Size: Adult)   Pulse 99   Temp 36.4 °C (97.5 °F) (Temporal)   Resp 16   Ht 1.6 m (5' 3\")   Wt 64.9 kg (143 lb)   SpO2 94%   Physical Exam  Vitals signs reviewed.   Constitutional:       General: She is not in acute distress.     Appearance: Normal appearance. She is well-developed and normal weight.   HENT:      Head: Normocephalic and atraumatic.      Right Ear: External ear normal.      Left Ear: External ear normal.      Nose: Nose normal. No congestion.      Mouth/Throat:      Mouth: Mucous membranes are moist.      Pharynx: Oropharynx is clear. No oropharyngeal exudate.   Eyes:      General: No scleral icterus.     Conjunctiva/sclera: Conjunctivae normal.      Pupils: Pupils are equal, round, and reactive to light.   Neck:      Musculoskeletal: Normal range of motion and neck supple.      Vascular: No JVD.      Trachea: No tracheal deviation.   Cardiovascular:      Rate and Rhythm: Normal rate and regular rhythm.      Heart sounds: Normal heart sounds. No murmur. No friction rub. No gallop.    Pulmonary:      Effort: Pulmonary effort is normal. No accessory muscle usage or respiratory distress.      Breath sounds: No wheezing or rales.      Comments: Bilateral dry inspiratory crackles heard 50% up posterior lung fields.  Abdominal:      General: There is no distension.      Palpations: " Abdomen is soft.      Tenderness: There is no abdominal tenderness.   Musculoskeletal: Normal range of motion.         General: Tenderness present. No deformity.      Right lower leg: No edema.      Left lower leg: No edema.      Comments: Point tenderness to palpation in the left anterior lower rib no palpated abnormality   Lymphadenopathy:      Cervical: No cervical adenopathy.   Skin:     General: Skin is warm and dry.      Findings: No rash.      Nails: There is no clubbing.     Neurological:      Mental Status: She is alert and oriented to person, place, and time.      Cranial Nerves: No cranial nerve deficit.      Gait: Gait normal.   Psychiatric:         Mood and Affect: Mood normal.         Behavior: Behavior normal.         PFTs as reviewed by me personally: as per hPI    Imaging as reviewed by me personally:  As per hPI    Assessment:  1. Interstitial pulmonary disease (HCC)  DME O2 New Set Up    JOVITA IGG RENAN W/RFLX TO JOVITA IGG IFA    SSA 52 AND 60 (RO)(MICHAEL) AB, IGG    SYSTEMIC SCLEROSIS PANEL    RHEUMATOID ARTHRITIS FACTOR    CCP-CYCLIC CITRULLINATED PEPTID   2. Metastatic breast carcinoma_[facility]  [id number] (W/O CXR)   3. Chest pain, unspecified type  [id number] (W/O CXR)   4. Respiratory failure with hypoxia, unspecified chronicity (HCC)         Plan:  1. This is a new dx for the pt but not a new finding since starting Enhertu based on CT reports from 9/19 ad 12/19 although I cannot say if there has been significant change/progression since starting Enhertu therefore to be safe I would recommend we continue to hold this agent. We will request images be pushed for direct comparison. I will screen for CTD and we will get her started on supplemental O2 with activity. I would like to hold off on steroids until I can view images from [facility] and screen for CTD however I think a trial of high-dose steroids for the pneumonitis may be warranted given new respiratory failure.  2. See  above. I cannot say for sure that her pulmonary findings were exacerbated by chemotherapy however unlikely caused by it given chronic changes were appreciated prior to Enhertu. W/U as per above.   3. This is very focal and TTP therefore unlikely to be PE. She has pulmonary mets but no bony mets that I am aware of. I have ordered rib films. Okay to continue prn NSAIDs.  4. New dx for pt and likely 2/2 chronic interstitial process with possible acute component. As per above we will start supplemental O2 and consider systemic steroids. Short interval f/u.   Return in about 3 weeks (around 10/20/2020).

## 2020-09-30 NOTE — TELEPHONE ENCOUNTER
----- Message from Coco Ma M.D. sent at 9/29/2020 12:07 PM PDT -----  That is fine.    ----- Message -----  From: Sheba Cortez, Med Ass't  Sent: 9/29/2020  11:47 AM PDT  To: Coco Ma M.D.    Is it okay for the patient to take ibup for pain?  mg tabs 2 tabs a time.

## 2020-09-30 NOTE — TELEPHONE ENCOUNTER
Patient was contacted informed per Dr. Ma as directed below. Pt states completed XRay ribs at [facility]. Pt also made aware progress note from Pulmonary visit 9/29/2020 sent to oncologist Dustin Yost MD

## 2020-10-01 NOTE — TELEPHONE ENCOUNTER
Coco Ma M.D.  You 4 hours ago (12:45 PM)     There are no acute or healing rib fractures. At this point I still think her pain is musculoskeletal and she can continue ibuprofen. If the pain is not improving or worsening then she should contact her PCP or go to urgent care.     Message text

## 2020-10-01 NOTE — TELEPHONE ENCOUNTER
"Patient was contacted informer per Dr. Ma \"There are no acute or healing rib fractures. At this point I still think her pain is musculoskeletal and she can continue ibuprofen. If the pain is not improving or worsening then she should contact her PCP or go to urgent care.\"     Patient aware, confirmed received oxygen and was advised 2 LPM exertion and Nocturnal   "

## 2020-10-05 NOTE — PATIENT COMMUNICATION
I faxed notes as patient requested to Dr. Yost [phone]     I called Dr. Yots's office to confirm fax. Fax confirmed [phone].    Notes have been faxed once more and will call office to confirm notes received.

## 2020-10-05 NOTE — TELEPHONE ENCOUNTER
Caller: Katie    Phone Number: [phone] (home)     Message: Pt called and l/m,. Wanting to verify is something before 10/19.      Called and spoke with pt. Pt said she has completed all tests that were ordered and would like to see if she can be seen sooner.  Scheduled pt an appt on 10/06/2020 with Dr Ma.  (she kept the appt on 10/19, awaiting to see if her  appt can be changed.)

## 2020-10-05 NOTE — PATIENT COMMUNICATION
I confirmed with staff at Dr. Yost's office notes from 9/29/2020 and testing received.    Messaged sent to the patient via [email or web address]

## 2020-10-06 NOTE — PROGRESS NOTES
Chief Complaint   Patient presents with   • Follow-Up     last seen 9/29/2020    • Results     labs 9/29/220          HPI: This patient is a 75 y.o. female whom is followed in our clinic for ILD c/b hypoxic respiratory failure last seen by me on 9/29/20.  The pt has a hx of breast Ca dx. Initially in 2017 tx with XRT and lumpectomy. She then had recurrence in 5/2019 found incidentally when she had imaging done to evaluate for injuries after a mechanical fall. She had pulmonary metastasis at the time confirmed by lung bx. She was tx with 2 different chemo regimens for Her2+, ER/KS- recurrent, metastatic lung Ca with progression of disease and most recently was put on Enhertu in February due to intolerance of capcetabine. Over the past several months the pt has had progressive SOB of cough and she has demonstrated pneumonitis with bibasilar GGO on imaging concerning for chemotherapy effects thus was referred to us. Her Enhertu was stopped temporarily and it has been reported to be associated with rare ILD/pulmonary fibrosis however the pt has CT chest from [facility] dating as far back as 9/2019 prior to this agent reporting ILD in b/l bases.  I did not have the images to review at her last appointment but have been able to obtain images from September 2019, December 2019 and compare them to her CT chest done on September 3 as well as more recent CT abdomen and pelvis done on October 1.  The patient did have bibasilar, subpleural reticulation without associated fibrosis or significant groundglass opacity both in September 2019 and December 2019.  There does appear to have been significant progression since that time based on CT done on September 3 with new groundglass opacities involving bilateral lower lobes, increased interstitial thickening with appearance of traction bronchiectasis and some honeycombing suggesting advanced fibrotic process.  Furthermore she now has demonstrated desaturation with ambulation.  We  did connective tissue disease screening which showed a mildly positive JOVITA at 1:160 in a speckled pattern and anti-RNP polymerase III negative SSA, mildly elevated rheumatoid factor XX 9 but no elevation of CCP antibodies.  I did explain to the patient that unfortunately this particular pattern of antibody presence has been demonstrated in a decent number of patients with breast cancer particularly those were ER/OR negative and that this does not necessarily give her a diagnosis of underlying connective tissue disease.  Furthermore she is concerned that 1 of the pulmonary nodules appear slightly larger on her most recent CT abdomen and pelvis and is fearful that she may not be able to continue on Enhurtu.  Her oncologist, Dr. Yost at [facility] on 10/9.  She continues to complain of mid left-sided back pain that is dual and sharp anterior left-sided subcostal pain that is intermittent and pinpoint in location.  We did rib films at our last clinic visit to evaluate for rib pathology and there were no significant findings.  No obvious bony metastases.    Past Medical History:   Diagnosis Date   • Arthritis     osteo   • Asthma     does not use inhalers   • Back pain    • Breast cancer (HCC)    • Bronchitis    • Bunion     both feet   • Cancer (HCC)     skin   • Cancer (HCC) 2017    breast   • Chickenpox    • Hypertension 2017    pt states well controlled on meds   • Irritable bowel syndrome    • Painful breathing    • Plantar fasciitis of right foot    • Prediabetes    • Shortness of breath    • Tonsillitis    • Wears glasses    • Wheezing        Social History     Socioeconomic History   • Marital status:      Spouse name: Not on file   • Number of children: Not on file   • Years of education: Not on file   • Highest education level: Not on file   Occupational History   • Occupation: retired-  , college   Social Needs   • Financial resource strain: Hard   • Food insecurity     Worry:  Not on file     Inability: Not on file   • Transportation needs     Medical: Not on file     Non-medical: Not on file   Tobacco Use   • Smoking status: Never Smoker   • Smokeless tobacco: Never Used   Substance and Sexual Activity   • Alcohol use: Not Currently     Alcohol/week: 0.0 oz   • Drug use: No   • Sexual activity: Yes     Partners: Male   Lifestyle   • Physical activity     Days per week: Not on file     Minutes per session: Not on file   • Stress: Not on file   Relationships   • Social connections     Talks on phone: Not on file     Gets together: Not on file     Attends Yarsani service: Not on file     Active member of club or organization: Not on file     Attends meetings of clubs or organizations: Not on file     Relationship status: Not on file   • Intimate partner violence     Fear of current or ex partner: Not on file     Emotionally abused: Not on file     Physically abused: Not on file     Forced sexual activity: Not on file   Other Topics Concern   • Not on file   Social History Narrative    [place].     .     2 daughters.        Family History   Problem Relation Age of Onset   • Cancer Mother         leukemia   • Heart Disease Mother         arrhythmia   • Cancer Father         lung   • Lung Disease Father    • No Known Problems Sister    • Diabetes Neg Hx        Current Outpatient Medications on File Prior to Visit   Medication Sig Dispense Refill   • tramadol (ULTRAM) 50 MG Tab Take 1 Tab by mouth every 8 hours as needed for Moderate Pain or Severe Pain for up to 10 days. 30 Tab 0   • Mometasone Furo-Formoterol Fum 100-5 MCG/ACT Aerosol Inhale 1 Inhalation by mouth 2 Times a Day. 13 g 5   • albuterol 108 (90 Base) MCG/ACT Aero Soln inhalation aerosol Inhale 2 Puffs by mouth every 6 hours as needed for Shortness of Breath. 8.5 g 5   • ezetimibe (ZETIA) 10 MG Tab Take 1 Tab by mouth every day. 90 Tab 1   • ibuprofen (MOTRIN) 200 MG Tab Take 200 mg by mouth every 6 hours as needed.     •  ALPRAZolam (XANAX) 0.25 MG Tab Take 0.25 mg by mouth at bedtime as needed for Sleep.     • amLODIPine (NORVASC) 5 MG Tab Take 1 Tab by mouth every day. 90 Tab 1   • B Complex Vitamins (VITAMIN B COMPLEX PO) Take  by mouth.     • Multiple Vitamins-Minerals (ONE-A-DAY 50 PLUS PO) Take  by mouth.     • Coenzyme Q10 (COQ-10 PO) Take 1 Tab by mouth every day.     • cyanocobalamin (VITAMIN B-12) 500 MCG Tab Take 500 mcg by mouth every day.     • vitamin D (CHOLECALCIFEROL) 1000 UNIT Tab Take 1,000 Units by mouth every day.       No current facility-administered medications on file prior to visit.        Bactrim [sulfamethoxazole w-trimethoprim] and Statins [hmg-coa-r inhibitors]      ROS:   Review of Systems   Constitutional: Negative for chills, diaphoresis, fever, malaise/fatigue and weight loss.   HENT: Negative for congestion, ear discharge, ear pain, hearing loss, nosebleeds, sinus pain, sore throat and tinnitus.    Eyes: Negative for blurred vision, double vision, photophobia, pain, discharge and redness.   Respiratory: Positive for shortness of breath. Negative for cough, hemoptysis, sputum production, wheezing and stridor.    Cardiovascular: Positive for chest pain. Negative for palpitations, orthopnea, claudication, leg swelling and PND.   Gastrointestinal: Negative for abdominal pain, constipation, diarrhea, heartburn, nausea and vomiting.   Genitourinary: Negative for dysuria and urgency.   Musculoskeletal: Negative for back pain, falls, joint pain, myalgias and neck pain.   Skin: Negative for itching and rash.   Neurological: Negative for dizziness, tremors, speech change, focal weakness, weakness and headaches.   Endo/Heme/Allergies: Negative for environmental allergies.   Psychiatric/Behavioral: Negative for depression.       /70 (BP Location: Left arm, Patient Position: Sitting, BP Cuff Size: Adult)   Pulse 92   Temp 36.4 °C (97.5 °F) (Temporal)   Resp 16   Wt 65.8 kg (145 lb)   SpO2 96%    Physical Exam  Vitals signs reviewed.   Constitutional:       General: She is not in acute distress.     Appearance: Normal appearance. She is well-developed and normal weight.   HENT:      Head: Normocephalic and atraumatic.      Right Ear: External ear normal.      Left Ear: External ear normal.      Nose: Nose normal. No congestion.      Mouth/Throat:      Mouth: Mucous membranes are moist.      Pharynx: Oropharynx is clear. No oropharyngeal exudate.   Eyes:      General: No scleral icterus.     Extraocular Movements: Extraocular movements intact.      Conjunctiva/sclera: Conjunctivae normal.      Pupils: Pupils are equal, round, and reactive to light.   Neck:      Musculoskeletal: Normal range of motion and neck supple.      Vascular: No JVD.      Trachea: No tracheal deviation.   Cardiovascular:      Rate and Rhythm: Normal rate and regular rhythm.      Heart sounds: Normal heart sounds. No murmur. No friction rub. No gallop.    Pulmonary:      Effort: Pulmonary effort is normal. No accessory muscle usage or respiratory distress.      Breath sounds: No wheezing or rales.      Comments: Early, dry, fine inspiratory bibasilar crackles roughly 1/3 of the posterior lung fields  Abdominal:      General: There is no distension.      Palpations: Abdomen is soft.      Tenderness: There is no abdominal tenderness.   Musculoskeletal: Normal range of motion.         General: No tenderness or deformity.      Right lower leg: No edema.      Left lower leg: No edema.   Lymphadenopathy:      Cervical: No cervical adenopathy.   Skin:     General: Skin is warm and dry.      Findings: No rash.      Nails: There is no clubbing.     Neurological:      Mental Status: She is alert and oriented to person, place, and time.      Cranial Nerves: No cranial nerve deficit.      Gait: Gait normal.   Psychiatric:         Mood and Affect: Mood normal.         Behavior: Behavior normal.         PFTs as reviewed by me personally:  Pending    Imaging as reviewed by me personally: As per HPI    Assessment:  1. Respiratory failure with hypoxia, unspecified chronicity (HCC)  Overnight Oximetry   2. Interstitial pulmonary disease (HCC)     3. Metastatic breast carcinoma_[facility]         Plan:  1.  This is a new diagnosis and secondary to progressive interstitial lung disease with evidence of fibrosis.  She is having difficulty using the oxygen due to getting tangled in the tubing at night and difficulty with the smell and tubing.  I do think she will need it at night but did offer to do an overnight oximetry study to ensure she is having desaturation at night.  In the interim I encouraged her to use supplemental oxygen with activity during the day.  See discussion below regarding treatment otherwise.  2.  This process was clearly beginning prior to initiation of therapy with Enhertu however I cannot say that the radiograph in the attic component was not exacerbated by the chemotherapeutic agent.  Reviewing Dr. Yost's notes from early September, there are no other obvious known agents that could have caused the findings we see in September and December.  Given that she has significant groundglass opacities, some of the changes may be reversible and I recommended a trial of high-dose prednisone dose to 60 mg for 2 weeks, 40 mg for 2 weeks followed by 20 mg until follow-up with me in 4 to 6 weeks.  We will give her dapsone for PCP prophylaxis for the first 4 weeks as patient reports adverse reaction to Bactrim in the past.  She would like to discuss with oncology and I have left a voicemail with Dr. Yost's clinic today.  As per HPI, I cannot say that this is due to connective tissue disease given the pattern of antibody elevation can be seen in breast cancer pacifically her type.  Either way the treatment would not be different.  We would start with immunosuppression with prednisone to see if she responds, consider steroid sparing  agents such as mycophenolate if there was a response versus transition to anti-fibrotic agents.  For now we will continue supplemental oxygen and follow-up after her visit with oncology.  3.  Patient with known pulmonary metastases 1 of which appears to be slightly larger and patient is quite concerned appropriately so regarding this.  I did discuss that rapidly progressive fibrotic lung disease would likely be an issue faster than her pulmonary nodules but I did inform her that I would try to contact her oncology provider to speak with him directly and I will see her back after her appointment with them on October 9.  Return for Patient has follow-up.

## 2020-10-08 NOTE — TELEPHONE ENCOUNTER
Jose Nurse Navigator at [facility] called and stated they had pt in clinic and she had informed them that she recently has some tests done ordered by Dr. Ma. (PFT, 6MW, X-ray). She requested results be sent to [phone]. Faxed per request.

## 2020-10-19 NOTE — PATIENT INSTRUCTIONS
Prednisone 40 mg x7 days, then 30 mg daily x7 days, then 20 mg daily x2 weeks then 10 mg daily for two weeks    Consider ketoralac for pain (toradol)

## 2020-10-19 NOTE — PROGRESS NOTES
Chief Complaint   Patient presents with   • Follow-Up     ILD Last seen 10/6/2020    • Results     OPO 10/16/2020          HPI: This patient is a 75 y.o. female whom is followed in our clinic for ILD last seen by me on 10/6/20.   The pt has a hx of breast Ca dx. Initially in 2017 tx with XRT and lumpectomy. She then had recurrence in 5/2019 found incidentally when she had imaging done to evaluate for injuries after a mechanical fall. She had pulmonary metastasis at the time confirmed by lung bx. She was tx with 2 different chemo regimens for Her2+, ER/MN- recurrent, metastatic lung Ca with progression of disease and most recently was put on Enhertu in February due to intolerance of capcetabine. Over the past several months the pt has had progressive SOB of cough and she has demonstrated pneumonitis with bibasilar GGO on imaging concerning for chemotherapy effects thus was referred to us. Her Enhertu was stopped temporarily and it has been reported to be associated with rare ILD/pulmonary fibrosis however the pt has CT chest from [facility] dating as far back as 9/2019 prior to this agent reporting ILD in b/l bases.    I was able to review images from September 2019, December 2019 and compare them to her CT chest done on September 3 as well as more recent CT abdomen and pelvis done on October 1.  The patient did have bibasilar, subpleural reticulation without associated fibrosis or significant groundglass opacity both in September 2019 and December 2019.  There does appear to have been significant progression since that time based on CT done on September 3 with new groundglass opacities involving bilateral lower lobes, increased interstitial thickening with appearance of traction bronchiectasis and some honeycombing suggesting advanced fibrotic process.  Since being referred to us, she has exhibited desaturation with ambulation in addition to nocturnal hypoxia on room air.  We did connective tissue disease  screening which showed a mildly positive JOVITA at 1:160 in a speckled pattern and anti-RNP polymerase III negative SSA, mildly elevated rheumatoid factor XX 9 but no elevation of CCP antibodies. Unfortunately this particular pattern of antibody presence has been demonstrated in a decent number of patients with breast cancer particularly those were ER/SC negative and that this does not necessarily give her a diagnosis of underlying connective tissue disease.  Our plan at our last clinic visit was to start prednisone for presumed interstitial lung disease secondary to chemotherapeutic agent.  We started with 60 mg which patient did not tolerate due to elevated heart rate and elevated blood pressure.  She subsequently reduced it to 40 mg which she has been taking for the past 4 days.  I had also prescribed her dapsone for P HARPAL prophylaxis however she is not tolerating this.  She has not tolerated Bactrim in the past.  She presents today for routine follow-up and to review overnight oximetry done on room air as she has not been tolerating oxygen tubing.  As per above she did desaturate remaining below 88% for the majority of the night.  She is 92% on room air at rest today.  She continues to complain of left-sided mid back pain as well as anterior left-sided subcostal pain for which we ordered rib films and showed no fracture.  I reviewed her CT from September as well as October with no clear chest wall involvement in those areas, there is a lesion anteriorly on the left that could have some pleural involvement and her pain does improve with nonsteroidal anti-inflammatories as well as prednisone.  Pulmonary function testing from September 18 showed normal airflows, low normal total lung capacity at 85% predicted however patient was unable to perform DLCO.    Past Medical History:   Diagnosis Date   • Arthritis     osteo   • Asthma     does not use inhalers   • Back pain    • Breast cancer (HCC)    • Bronchitis    • Bunion      both feet   • Cancer (HCC)     skin   • Cancer (HCC) 2017    breast   • Chickenpox    • Hypertension 2017    pt states well controlled on meds   • Irritable bowel syndrome    • Painful breathing    • Plantar fasciitis of right foot    • Prediabetes    • Shortness of breath    • Tonsillitis    • Wears glasses    • Wheezing        Social History     Socioeconomic History   • Marital status:      Spouse name: Not on file   • Number of children: Not on file   • Years of education: Not on file   • Highest education level: Not on file   Occupational History   • Occupation: retired-  , [employer]   Social Needs   • Financial resource strain: Hard   • Food insecurity     Worry: Not on file     Inability: Not on file   • Transportation needs     Medical: Not on file     Non-medical: Not on file   Tobacco Use   • Smoking status: Never Smoker   • Smokeless tobacco: Never Used   Substance and Sexual Activity   • Alcohol use: Not Currently     Alcohol/week: 0.0 oz   • Drug use: No   • Sexual activity: Yes     Partners: Male   Lifestyle   • Physical activity     Days per week: Not on file     Minutes per session: Not on file   • Stress: Not on file   Relationships   • Social connections     Talks on phone: Not on file     Gets together: Not on file     Attends Taoism service: Not on file     Active member of club or organization: Not on file     Attends meetings of clubs or organizations: Not on file     Relationship status: Not on file   • Intimate partner violence     Fear of current or ex partner: Not on file     Emotionally abused: Not on file     Physically abused: Not on file     Forced sexual activity: Not on file   Other Topics Concern   • Not on file   Social History Narrative    [place].     .     2 daughters.        Family History   Problem Relation Age of Onset   • Cancer Mother         leukemia   • Heart Disease Mother         arrhythmia   • Cancer Father         lung   •  Lung Disease Father    • No Known Problems Sister    • Diabetes Neg Hx        Current Outpatient Medications on File Prior to Visit   Medication Sig Dispense Refill   • predniSONE (DELTASONE) 20 MG Tab Take 60 mg (3 tablets) by mouth daily in AM x14 days, then take 40 mg (2 tablets) by mouth daily x14 days then take 20 mg (one tablet) daily 90 Tab 2   • Mometasone Furo-Formoterol Fum 100-5 MCG/ACT Aerosol Inhale 1 Inhalation by mouth 2 Times a Day. 13 g 5   • albuterol 108 (90 Base) MCG/ACT Aero Soln inhalation aerosol Inhale 2 Puffs by mouth every 6 hours as needed for Shortness of Breath. 8.5 g 5   • ezetimibe (ZETIA) 10 MG Tab Take 1 Tab by mouth every day. 90 Tab 1   • ibuprofen (MOTRIN) 200 MG Tab Take 200 mg by mouth every 6 hours as needed.     • ALPRAZolam (XANAX) 0.25 MG Tab Take 0.25 mg by mouth at bedtime as needed for Sleep.     • amLODIPine (NORVASC) 5 MG Tab Take 1 Tab by mouth every day. 90 Tab 1   • B Complex Vitamins (VITAMIN B COMPLEX PO) Take  by mouth.     • Multiple Vitamins-Minerals (ONE-A-DAY 50 PLUS PO) Take  by mouth.     • Coenzyme Q10 (COQ-10 PO) Take 1 Tab by mouth every day.     • vitamin D (CHOLECALCIFEROL) 1000 UNIT Tab Take 1,000 Units by mouth every day.     • dapsone 100 MG Tab Take 1 Tab by mouth every day. Until prednisone is only 20 mg, then stop 28 Tab 0   • cyanocobalamin (VITAMIN B-12) 500 MCG Tab Take 500 mcg by mouth every day.       No current facility-administered medications on file prior to visit.        Bactrim [sulfamethoxazole w-trimethoprim] and Statins [hmg-coa-r inhibitors]      ROS:   Review of Systems   Constitutional: Negative for chills, diaphoresis, fever, malaise/fatigue and weight loss.   HENT: Negative for congestion, ear discharge, ear pain, hearing loss, nosebleeds, sinus pain, sore throat and tinnitus.    Eyes: Negative for blurred vision, double vision, photophobia, pain, discharge and redness.   Respiratory: Negative for cough, hemoptysis, sputum  "production, shortness of breath, wheezing and stridor.    Cardiovascular: Negative for chest pain, palpitations, orthopnea, claudication, leg swelling and PND.   Gastrointestinal: Positive for nausea. Negative for abdominal pain, constipation, diarrhea, heartburn and vomiting.   Genitourinary: Negative for dysuria and urgency.   Musculoskeletal: Positive for back pain. Negative for falls, joint pain, myalgias and neck pain.   Skin: Negative for itching and rash.   Neurological: Negative for dizziness, tremors, speech change, focal weakness, weakness and headaches.   Endo/Heme/Allergies: Negative for environmental allergies.   Psychiatric/Behavioral: Negative for depression. The patient is nervous/anxious.        /70 (BP Location: Left arm, Patient Position: Sitting, BP Cuff Size: Adult)   Pulse 92   Temp 36.5 °C (97.7 °F) (Temporal)   Resp 20   Ht 1.6 m (5' 3\")   Wt 65.8 kg (145 lb)   SpO2 92%   Physical Exam  Constitutional:       General: She is not in acute distress.     Appearance: Normal appearance. She is well-developed and normal weight.   HENT:      Head: Normocephalic and atraumatic.      Right Ear: External ear normal.      Left Ear: External ear normal.      Nose: Nose normal. No congestion.      Mouth/Throat:      Mouth: Mucous membranes are moist.      Pharynx: Oropharynx is clear. No oropharyngeal exudate.   Eyes:      General: No scleral icterus.     Extraocular Movements: Extraocular movements intact.      Conjunctiva/sclera: Conjunctivae normal.      Pupils: Pupils are equal, round, and reactive to light.   Neck:      Musculoskeletal: Normal range of motion and neck supple.      Vascular: No JVD.      Trachea: No tracheal deviation.   Cardiovascular:      Rate and Rhythm: Normal rate and regular rhythm.      Heart sounds: Normal heart sounds. No murmur. No friction rub. No gallop.    Pulmonary:      Effort: No accessory muscle usage or respiratory distress.      Breath sounds: Normal " breath sounds. No wheezing or rales.   Abdominal:      General: There is no distension.      Palpations: Abdomen is soft.      Tenderness: There is no abdominal tenderness.   Musculoskeletal: Normal range of motion.         General: Tenderness present. No deformity.      Right lower leg: No edema.      Left lower leg: No edema.      Comments: Point tenderness to mid thorax just left of midline, no crepitus   Lymphadenopathy:      Cervical: No cervical adenopathy.   Skin:     General: Skin is warm and dry.      Findings: No rash.      Nails: There is no clubbing.     Neurological:      Mental Status: She is alert and oriented to person, place, and time.      Cranial Nerves: No cranial nerve deficit.      Gait: Gait normal.   Psychiatric:         Mood and Affect: Mood normal.         Behavior: Behavior normal.         PFTs as reviewed by me personally: as per HPI    Imaging as reviewed by me personally:  As per hPI    Assessment:  1. Interstitial pulmonary disease (HCC)     2. Metastatic breast carcinoma_[facility]     3. Respiratory failure with hypoxia, unspecified chronicity (HCC)     4. Chronic left-sided thoracic back pain         Plan:  1.  This was present at least to some degree as far back as September 2019 but has progressed significantly.  Her connective tissue disease serologies suggest underlying connective tissue disease but as per HPI this pattern can be seen in patients with estrogen and progesterone receptor negative breast cancer.  We will proceed with steroid trial given groundglass opacities on most recent CT but altering the dosing to 40 mg for another 2 days which will be 1 week at this dose followed by 30 mg for 7 days, followed by 20 mg for 14 days, followed by 10 mg for 14 days and follow-up with me.  The lower dosing was to help the patient tolerate and also to remove the risk for PJP given that she is not tolerating dapsone.  She will follow up with me in 6 weeks and remain off  Enhertu.  2.  Followed closely by oncology.  She is apparently starting an alternative regimen for the next 30 days while we see if we can improve her pulmonary status.  3.  Patient is okay on room air at rest.  I strongly encouraged her to use supplemental oxygen at night and with activity.  4.  There are no obvious lesions on imaging as recently as October 1 however I am not sure if this imaging goes high enough.  Encouraged her to follow-up with both PCP and oncology given concern for metastasis.  Return in about 6 weeks (around 11/30/2020) for ild.

## 2020-10-20 NOTE — PROCEDURES
This was an overnight oximetry done on room air on 10/16/20.  Duration was 9 hours and 3 minutes. Mean SpO2 was 87%. The pt spent the majority of the time (309 minutes) with SpO2 <88%. No variations to suggest RONNY. This pt has significant nocturnal hypoxia and would benefit from supplemental O2 at night.

## 2020-12-04 PROBLEM — J96.11 CHRONIC RESPIRATORY FAILURE WITH HYPOXIA (HCC): Status: ACTIVE | Noted: 2020-01-01

## 2020-12-04 PROBLEM — N12 PYELONEPHRITIS: Status: ACTIVE | Noted: 2020-01-01

## 2020-12-04 NOTE — ED TRIAGE NOTES
PATIENT STATED THAT SHE HAS LEFT SIDED FLANK PAIN; GOING ON FOR TWO MONTHS. PATIENT STATED THAT LAST NIGHT SHE HAS BLOOD IN HER URINE. THIS IS NEW FOR THE PATIENT. PATIENT STATED THAT SHE ATTEMPTED TO RESOLVE PAIN BY TAKING MEDICATIONS, WITHOUT SUCCESS. PATIENT TOOK TRAMADOL, TORADOL, AND IBUPROFEN AT HOME. PATIENT BELIEVES THAT THIS PAIN STARTED AFTER HAVING A BONE DENSITY SCAN.

## 2020-12-04 NOTE — ED NOTES
Pharmacy Medication Reconciliation      Medication reconciliation updated and complete per pt  Allergies have been verified and updated   No oral ABX within the last 14 days  [facility]

## 2020-12-04 NOTE — ASSESSMENT & PLAN NOTE
History of interstitial lung disease.  At baseline for now.  Continue with bronchodilators as needed.  RT protocol in place.

## 2020-12-04 NOTE — ASSESSMENT & PLAN NOTE
History of invasive ductal carcinoma H ER 2+, ER/TN negative stage IV with metastasis to the lungs.  Status post radiation therapy.  Currently on Enhertu with last infusion was on November 17, 2020 and now with evidence of advanced disease with a CT showed new liver metastases.  Overall prognosis is guarded  Patient follow-up with oncology at [facility].

## 2020-12-04 NOTE — ED PROVIDER NOTES
ED Provider Note    CHIEF COMPLAINT  Chief Complaint   Patient presents with   • Flank Pain     LEFT SIDED FLANK PAIN   • Blood in Urine     STARTED LAST NIGHT       HPI  Katie Monroy is a 75 y.o. female who presents to the emergency department with a chief complaint of flank pain.  The patient has had flank pain for the last several months.  Is been localized to the left side.  She is been seeing a physician for this and they have not been able to figure out what is causing her pain.  She had laboratory test done yesterday.  She does not know the results of these.  She has been told that she should get a CT scan of her abdomen and pelvis.  Past medical history includes current treatment for metastatic breast cancer.  She had chemotherapy for the first time on 17 November.  She denies any fevers.  No cough.  No shortness of breath.  She has had some hematuria and dysuria.  This just started last night.    REVIEW OF SYSTEMS  See HPI for further details. All other systems are negative.     PAST MEDICAL HISTORY  Past Medical History:   Diagnosis Date   • Arthritis     osteo   • Asthma     does not use inhalers   • Back pain    • Breast cancer (HCC)    • Bronchitis    • Bunion     both feet   • Cancer (HCC)     skin   • Cancer (HCC) 2017    breast   • Chickenpox    • Hypertension 2017    pt states well controlled on meds   • Irritable bowel syndrome    • Painful breathing    • Plantar fasciitis of right foot    • Prediabetes    • Shortness of breath    • Tonsillitis    • Wears glasses    • Wheezing        FAMILY HISTORY  Family History   Problem Relation Age of Onset   • Cancer Mother         leukemia   • Heart Disease Mother         arrhythmia   • Cancer Father         lung   • Lung Disease Father    • No Known Problems Sister    • Diabetes Neg Hx        SOCIAL HISTORY  Social History     Socioeconomic History   • Marital status:      Spouse name: Not on file   • Number of children: Not on file   • Years of  education: Not on file   • Highest education level: Not on file   Occupational History   • Occupation: retired-  , [employer]   Social Needs   • Financial resource strain: Hard   • Food insecurity     Worry: Not on file     Inability: Not on file   • Transportation needs     Medical: Not on file     Non-medical: Not on file   Tobacco Use   • Smoking status: Never Smoker   • Smokeless tobacco: Never Used   Substance and Sexual Activity   • Alcohol use: Not Currently     Alcohol/week: 0.0 oz   • Drug use: No   • Sexual activity: Yes     Partners: Male   Lifestyle   • Physical activity     Days per week: Not on file     Minutes per session: Not on file   • Stress: Not on file   Relationships   • Social connections     Talks on phone: Not on file     Gets together: Not on file     Attends Baptism service: Not on file     Active member of club or organization: Not on file     Attends meetings of clubs or organizations: Not on file     Relationship status: Not on file   • Intimate partner violence     Fear of current or ex partner: Not on file     Emotionally abused: Not on file     Physically abused: Not on file     Forced sexual activity: Not on file   Other Topics Concern   • Not on file   Social History Narrative    Monegasque.     .     2 daughters.        SURGICAL HISTORY  Past Surgical History:   Procedure Laterality Date   • MASTECTOMY Left 3/17/2017    Procedure: MASTECTOMY - PARTIAL, WIRE LOCALIZED;  Surgeon: Katy Gastelum M.D.;  [place] [facility] [place]:    • NODE BIOPSY SENTINEL Left 3/17/2017    Procedure: NODE BIOPSY SENTINEL;  Surgeon: Katy Gastelum M.D.;  [place] [facility] [place]  Service:    • LUNG BIOPSY OPEN     • OTHER      right ovary removed   • TONSILLECTOMY         CURRENT MEDICATIONS  Home Medications     Reviewed by Myriam Martínez R.N. (Registered Nurse) on 12/04/20 at 0657  Med List Status: Partial   Medication Last Dose Status  "  albuterol 108 (90 Base) MCG/ACT Aero Soln inhalation aerosol  Active   ALPRAZolam (XANAX) 0.25 MG Tab  Active   amLODIPine (NORVASC) 5 MG Tab  Active   B Complex Vitamins (VITAMIN B COMPLEX PO)  Active   Coenzyme Q10 (COQ-10 PO) 12/3/2020 Active   cyanocobalamin (VITAMIN B-12) 500 MCG Tab 12/3/2020 Active   dapsone 100 MG Tab  Active   ezetimibe (ZETIA) 10 MG Tab  Active   Mometasone Furo-Formoterol Fum 100-5 MCG/ACT Aerosol  Active   Multiple Vitamins-Minerals (ONE-A-DAY 50 PLUS PO) 12/3/2020 Active   naproxen (NAPROSYN) 500 MG Tab 12/3/2020 Active   predniSONE (DELTASONE) 20 MG Tab  Active   vitamin D (CHOLECALCIFEROL) 1000 UNIT Tab 12/3/2020 Active                ALLERGIES  Allergies   Allergen Reactions   • Bactrim [Sulfamethoxazole W-Trimethoprim]      Rash and pain   • Statins [Hmg-Coa-R Inhibitors]        PHYSICAL EXAM  VITAL SIGNS: /68   Pulse (!) 106   Temp 36.1 °C (96.9 °F) (Temporal)   Resp 20   Ht 1.6 m (5' 3\")   Wt 66.6 kg (146 lb 13.2 oz)   SpO2 91%   BMI 26.01 kg/m²   Constitutional: Well developed, Well nourished, mild distress, Non-toxic appearance.   HENT: Normocephalic, Atraumatic, Bilateral external ears normal, Oropharynx moist, No oral exudates, Nose normal.   Eyes: PERRL, EOMI, Conjunctiva normal, No discharge.   Neck: Normal range of motion, No tenderness, Supple, No stridor.   Lymphatic: No lymphadenopathy noted.   Cardiovascular: Normal heart rate, Normal rhythm, No murmurs, No rubs, No gallops.   Thorax & Lungs: Normal breath sounds, No respiratory distress, No wheezing, No chest tenderness.   Abdomen: Left CVA tenderness to palpation, there is a palpable soft tissue mass in the associated area.  Most consistent on physical exam with lipoma.  No overlying skin changes or erythema.  No right CVA tenderness to palpation.  No anterior abdominal tenderness to palpation or palpable masses.  No rebound tenderness or guarding.  Active bowel sounds.  Skin: Warm, Dry, No erythema, No " rash.   Back: No tenderness, No CVA tenderness.   Extremities: Intact distal pulses, No edema, No tenderness, No cyanosis, No clubbing.   Neurologic: Alert & oriented x 3, Normal motor function, Normal sensory function, No focal deficits noted.       RADIOLOGY/PROCEDURES  CT-ABDOMEN-PELVIS WITH    (Results Pending)     Results for orders placed or performed during the hospital encounter of 12/04/20   URINALYSIS,CULTURE IF INDICATED    Specimen: Urine   Result Value Ref Range    Color Yellow     Character Cloudy (A)     Specific Gravity 1.020 <1.035    Ph 6.0 5.0 - 8.0    Glucose Negative Negative mg/dL    Ketones 15 (A) Negative mg/dL    Protein Negative Negative mg/dL    Bilirubin Negative Negative    Nitrite Negative Negative    Leukocyte Esterase Moderate (A) Negative    Occult Blood Large (A) Negative    Micro Urine Req Microscopic    CBC WITH DIFFERENTIAL   Result Value Ref Range    WBC 12.7 (H) 4.8 - 10.8 K/uL    RBC 4.99 4.20 - 5.40 M/uL    Hemoglobin 14.5 12.0 - 16.0 g/dL    Hematocrit 44.5 37.0 - 47.0 %    MCV 89.2 81.4 - 97.8 fL    MCH 29.1 27.0 - 33.0 pg    MCHC 32.6 (L) 33.6 - 35.0 g/dL    RDW 42.8 35.9 - 50.0 fL    Platelet Count 228 164 - 446 K/uL    MPV 9.6 9.0 - 12.9 fL    Neutrophils-Polys 80.30 (H) 44.00 - 72.00 %    Lymphocytes 10.40 (L) 22.00 - 41.00 %    Monocytes 7.70 0.00 - 13.40 %    Eosinophils 0.90 0.00 - 6.90 %    Basophils 0.20 0.00 - 1.80 %    Immature Granulocytes 0.50 0.00 - 0.90 %    Nucleated RBC 0.00 /100 WBC    Neutrophils (Absolute) 10.17 (H) 2.00 - 7.15 K/uL    Lymphs (Absolute) 1.32 1.00 - 4.80 K/uL    Monos (Absolute) 0.98 (H) 0.00 - 0.85 K/uL    Eos (Absolute) 0.11 0.00 - 0.51 K/uL    Baso (Absolute) 0.02 0.00 - 0.12 K/uL    Immature Granulocytes (abs) 0.06 0.00 - 0.11 K/uL    NRBC (Absolute) 0.00 K/uL   BASIC METABOLIC PANEL   Result Value Ref Range    Sodium 135 135 - 145 mmol/L    Potassium 3.9 3.6 - 5.5 mmol/L    Chloride 98 96 - 112 mmol/L    Co2 26 20 - 33 mmol/L     Glucose 107 (H) 65 - 99 mg/dL    Bun 11 8 - 22 mg/dL    Creatinine 0.53 0.50 - 1.40 mg/dL    Calcium 9.0 8.4 - 10.2 mg/dL    Anion Gap 11.0 7.0 - 16.0   PROTHROMBIN TIME (INR)   Result Value Ref Range    PT 13.2 12.0 - 14.6 sec    INR 1.03 0.87 - 1.13   APTT   Result Value Ref Range    APTT 30.0 24.7 - 36.0 sec   URINE MICROSCOPIC (W/UA)   Result Value Ref Range    WBC  (A) /hpf    RBC 10-20 (A) /hpf    Bacteria Moderate (A) None /hpf    Epithelial Cells Few Few /hpf    Mucous Threads Few /hpf    Urine Crystals Few Amorphous /hpf   ESTIMATED GFR   Result Value Ref Range    GFR If African American >60 >60 mL/min/1.73 m 2    GFR If Non African American >60 >60 mL/min/1.73 m 2         COURSE & MEDICAL DECISION MAKING  Pertinent Labs & Imaging studies reviewed. (See chart for details)    I have reviewed the electronic medical record.  The patient had laboratory studies yesterday including urinalysis which was remarkable for significant leukocytes most likely consistent with a urinary tract infection.  Patient may have pyelonephritis causing her flank pain.  Differential diagnosis also includes ureterolithiasis, and metastatic disease.    An IV is established.  Laboratory studies are obtained.  The patient is treated with hydromorphone for pain control, Zofran for nausea.    Pain was well controlled with hydromorphone.  Laboratory studies as above.  White blood cell count is elevated.  The patient is not neutropenic.  Urinalysis is consistent with urinary tract infection.  The patient is treated with ceftriaxone.  CT scan of the abdomen and pelvis is obtained.  This demonstrates worsening metastatic disease.  No evidence of ureterolithiasis.    Patient will be admitted to the hospital given comorbidities and evidence of pyelonephritis.  I spoke with the on-call hospitalist Dr. Blood for admission.        FINAL IMPRESSION  1. Gross hematuria    2. Flank pain    3. Pyelonephritis    4. Metastatic breast cancer  ([facility])            Electronically signed by: Frandy Rosales M.D., 12/4/2020 8:03 AM

## 2020-12-04 NOTE — ED NOTES
Pt care assumed, male guest at bedside  Pt reports she already gave a urine sample  Pt reports she is always on 2L NC, pt placed on 2L NC    PIV placed, labs drawn, pt updated on plan of care

## 2020-12-04 NOTE — ASSESSMENT & PLAN NOTE
Complaining of dysuria, hematuria, and left flank pain with subjective fevers at home.  Evidence of UTI on UA.  Started broad-spectrum IV antibiotics.  Urine culture ordered.  Follow-up with culture results.

## 2020-12-04 NOTE — H&P
Hospital Medicine History & Physical Note    Date of Service  12/4/2020    Primary Care Physician  Hailey Bradshaw M.D.    Consultants  None    Code Status  Full Code    Chief Complaint  Chief Complaint   Patient presents with   • Flank Pain     LEFT SIDED FLANK PAIN   • Blood in Urine     STARTED LAST NIGHT       History of Presenting Illness  75 y.o. female who has past medical history of metastatic breast cancer to the lungs H ER 2+, ER/CO negative, status post lumpectomy currently on chemotherapy with last infusion on November 17, 2020 and next chemo therapy anticipated to be on next Tuesday, history of interstitial lung disease and chronic respiratory failure, and history of left sided back pain started 2 months ago comes in with worsening left-sided back pain/flank pain, and sudden onset of hematuria and dysuria.  The symptoms started last night as patient stated.  Patient has subjective fever but stated that usually she does not spike fevers in case of infection.  She also noticed that her urine color was red and was having symptoms of dysuria and polyuria.  The pain was unbearable so she presented to the hospital for further evaluation.  In ER she was noted to be slightly tachycardic.  Blood pressure was stable.  She was afebrile.  On blood work there was slight leukocytosis.  UA came back positive for UTI.  CT abdomen pelvis showed interval progression of metastatic disease with numerous hepatic metastatic lesions and enlarging adrenal masses and new pancreatic body lesion as well as enlarging pulmonary nodules.  The liver and pancreatic lesions are new compared to recent CT of the abdomen pelvis done on 10/1/2020.  She also had bone scan recently which was negative for bony metastases.  Patient follow-up with oncology at [facility].  Was started on broad-spectrum IV antibiotics.  Cultures obtained.    Review of Systems  Review of Systems   Constitutional: Positive for fever, malaise/fatigue  and weight loss. Negative for chills.   HENT: Negative for ear pain, hearing loss and tinnitus.    Eyes: Negative for blurred vision, double vision and photophobia.   Respiratory: Negative for cough and hemoptysis.    Cardiovascular: Negative for chest pain, palpitations and orthopnea.   Gastrointestinal: Negative for heartburn, nausea and vomiting.   Genitourinary: Positive for dysuria, flank pain, frequency, hematuria and urgency.   Musculoskeletal: Negative for myalgias and neck pain.   Skin: Negative for rash.   Neurological: Negative for dizziness, tingling and headaches.   Psychiatric/Behavioral: Negative for depression, substance abuse and suicidal ideas.       Past Medical History   has a past medical history of Arthritis, Asthma, Back pain, Breast cancer ([facility]), Bronchitis, Bunion, Cancer ([facility]), Cancer (HCC) (2017), Chickenpox, Hypertension (2017), Irritable bowel syndrome, Painful breathing, Plantar fasciitis of right foot, Prediabetes, Shortness of breath, Tonsillitis, Wears glasses, and Wheezing.    Surgical History   has a past surgical history that includes other; mastectomy (Left, 3/17/2017); node biopsy sentinel (Left, 3/17/2017); lung biopsy open; and tonsillectomy.     Family History  family history includes Cancer in her father and mother; Heart Disease in her mother; Lung Disease in her father; No Known Problems in her sister.     Social History   reports that she has never smoked. She has never used smokeless tobacco. She reports previous alcohol use. She reports that she does not use drugs.    Allergies  Allergies   Allergen Reactions   • Bactrim [Sulfamethoxazole W-Trimethoprim] Rash     Rash and pain   • Statins [Hmg-Coa-R Inhibitors] Myalgia       Medications  Prior to Admission Medications   Prescriptions Last Dose Informant Patient Reported? Taking?   ALPRAZolam (XANAX) 0.25 MG Tab 12/3/2020 at hs Patient No No   Sig: Take 1 Tab by mouth at bedtime as needed for Anxiety for up to 30 days.    B Complex Vitamins (VITAMIN B COMPLEX PO) 12/3/2020 at am Patient Yes No   Sig: Take 1 Tab by mouth every day.   Coenzyme Q10 (COQ-10 PO) 12/3/2020 at am Patient Yes No   Sig: Take 1 Tab by mouth every day.   Mometasone Furo-Formoterol Fum 100-5 MCG/ACT Aerosol Not Taking at Unknown time Patient No No   Sig: Inhale 1 Inhalation by mouth 2 Times a Day.   Patient not taking: Reported on 12/4/2020   Multiple Vitamins-Minerals (ONE-A-DAY 50 PLUS PO) 12/3/2020 at Unknown time Patient Yes No   Sig: Take  by mouth.   albuterol 108 (90 Base) MCG/ACT Aero Soln inhalation aerosol prn at prn Patient No No   Sig: Inhale 2 Puffs by mouth every 6 hours as needed for Shortness of Breath.   amLODIPine (NORVASC) 5 MG Tab 12/3/2020 at am Patient No No   Sig: Take 1 Tab by mouth every day.   cyanocobalamin (VITAMIN B-12) 500 MCG Tab 12/3/2020 at am Patient Yes No   Sig: Take 500 mcg by mouth every day.   dapsone 100 MG Tab Not Taking at Unknown time Patient No No   Sig: Take 1 Tab by mouth every day. Until prednisone is only 20 mg, then stop   Patient not taking: Reported on 12/4/2020   ezetimibe (ZETIA) 10 MG Tab 12/3/2020 at am Patient No No   Sig: Take 1 Tab by mouth every day.   ibuprofen (MOTRIN) 200 MG Tab 12/3/2020 at pm Patient Yes Yes   Sig: Take 400 mg by mouth every 6 hours as needed for Mild Pain, Fever or Headache.   naproxen (NAPROSYN) 500 MG Tab 12/3/2020 at Unknown time Patient No No   Sig: Take 1 Tab by mouth 2 times daily with meals as needed.   Patient not taking: Reported on 12/4/2020   nitrofurantoin (MACROBID) 100 MG Cap Not Taking at Unknown time Patient No No   Sig: Take 1 Cap by mouth 2 times a day for 7 days.   Patient not taking: Reported on 12/4/2020   predniSONE (DELTASONE) 20 MG Tab Not Taking at Unknown time Patient No No   Sig: Take 60 mg (3 tablets) by mouth daily in AM x14 days, then take 40 mg (2 tablets) by mouth daily x14 days then take 20 mg (one tablet) daily   Patient not taking: Reported on  12/4/2020   traMADol (ULTRAM) 50 MG Tab 12/3/2020 at pm Patient Yes Yes   Sig: Take 50 mg by mouth every four hours as needed for Moderate Pain.   vitamin D (CHOLECALCIFEROL) 1000 UNIT Tab 12/3/2020 at am Patient Yes No   Sig: Take 1,000 Units by mouth every day.      Facility-Administered Medications: None       Physical Exam  Temp:  [36.1 °C (96.9 °F)] 36.1 °C (96.9 °F)  Pulse:  [106] 106  Resp:  [20] 20  BP: (127-149)/(68-69) 149/68  SpO2:  [91 %-93 %] 91 %    Physical Exam  Constitutional:       General: She is not in acute distress.     Appearance: She is not ill-appearing.   HENT:      Head: Normocephalic and atraumatic.      Mouth/Throat:      Mouth: Mucous membranes are dry.   Eyes:      Extraocular Movements: Extraocular movements intact.   Cardiovascular:      Rate and Rhythm: Regular rhythm. Tachycardia present.      Heart sounds: No friction rub. No gallop.    Pulmonary:      Effort: Pulmonary effort is normal. No respiratory distress.   Abdominal:      General: There is no distension.      Palpations: Abdomen is soft.      Tenderness: There is left CVA tenderness.   Musculoskeletal:         General: No tenderness.   Skin:     Coloration: Skin is not jaundiced.   Neurological:      Mental Status: She is alert and oriented to person, place, and time.   Psychiatric:         Mood and Affect: Mood is anxious.         Laboratory:  Recent Labs     12/03/20  1454 12/04/20  0721   WBC 9.5 12.7*   RBC 4.81 4.99   HEMOGLOBIN 14.2 14.5   HEMATOCRIT 44.1 44.5   MCV 91.7 89.2   MCH 29.5 29.1   MCHC 32.2* 32.6*   RDW 45.2 42.8   PLATELETCT 229 228   MPV 9.5 9.6     Recent Labs     12/03/20  1454 12/04/20  0721   SODIUM 136 135   POTASSIUM 4.4 3.9   CHLORIDE 100 98   CO2 31 26   GLUCOSE 95 107*   BUN 14 11   CREATININE 0.68 0.53   CALCIUM 9.0 9.0     Recent Labs     12/03/20  1454 12/04/20  0721   ALTSGPT 46  --    ASTSGOT 63*  --    ALKPHOSPHAT 127*  --    TBILIRUBIN 0.4  --    GLUCOSE 95 107*     Recent Labs      12/04/20  0721   APTT 30.0   INR 1.03     No results for input(s): NTPROBNP in the last 72 hours.      No results for input(s): TROPONINT in the last 72 hours.    Imaging:  CT-ABDOMEN-PELVIS WITH   Final Result      1.  Interval progression of metastatic disease with numerous hepatic metastatic lesions, enlarging adrenal masses, new pancreatic body lesion, and enlarging pulmonary nodules.      2.  Atherosclerosis.      3.  Diverticulosis            Assessment/Plan:  I anticipate this patient is appropriate for observation status at this time.    * Pyelonephritis  Assessment & Plan  Complaining of dysuria, hematuria, and left flank pain with subjective fevers at home.  Evidence of UTI on UA.  Started broad-spectrum IV antibiotics.  Urine culture ordered.  Follow-up with culture results.    Chronic respiratory failure with hypoxia (HCC)- (present on admission)  Assessment & Plan  History of interstitial lung disease.  At baseline for now.  Continue with bronchodilators as needed.  RT protocol in place.    Metastatic breast carcinoma_[facility]- (present on admission)  Assessment & Plan  History of invasive ductal carcinoma H ER 2+, ER/MT negative stage IV with metastasis to the lungs.  Status post radiation therapy.  Currently on Enhertu with last infusion was on November 17, 2020 and now with evidence of advanced disease with a CT showed new liver metastases.  Overall prognosis is guarded  Patient follow-up with oncology at [facility].

## 2020-12-05 NOTE — DISCHARGE SUMMARY
Discharge Summary    CHIEF COMPLAINT ON ADMISSION  Chief Complaint   Patient presents with   • Flank Pain     LEFT SIDED FLANK PAIN   • Blood in Urine     STARTED LAST NIGHT       Reason for Admission  Flank Pain     Admission Date  12/4/2020    CODE STATUS  Full Code    HPI & HOSPITAL COURSE  This is a 75 y.o. female here with  metastatic breast cancer to the lungs H ER 2+, ER/MD negative, status post lumpectomy currently on chemotherapy with last infusion on November 17, 2020 and next chemo therapy anticipated to be on next Tuesday, history of interstitial lung disease and chronic respiratory failure, and history of left sided back pain started 2 months ago comes in with worsening left-sided back pain/flank pain, and sudden onset of hematuria and dysuria.  The symptoms started last night as patient stated.  Patient has subjective fever but stated that usually she does not spike fevers in case of infection.  She also noticed that her urine color was red and was having symptoms of dysuria and polyuria.  The pain was unbearable so she presented to the hospital for further evaluation.  In ER she was noted to be slightly tachycardic.  Blood pressure was stable.  She was afebrile.  On blood work there was slight leukocytosis.  UA came back positive for UTI.  CT abdomen pelvis showed interval progression of metastatic disease with numerous hepatic metastatic lesions and enlarging adrenal masses and new pancreatic body lesion as well as enlarging pulmonary nodules.  The liver and pancreatic lesions are new compared to recent CT of the abdomen pelvis done on 10/1/2020.  She also had bone scan recently which was negative for bony metastases.  Patient follow-up with oncology at [facility].  Was started on broad-spectrum IV antibiotics.  Cultures obtained.    She is seen at the bed side this morning and feels better and she is adamant that she wants to go home.i have prescribed cefdinir 300mg bid for 7 days  and zofran prn as needed.she states that she has an oncologist in [place] and will f/u with him.  I have also d/w the pt ct of abd results.    Therefore, she is discharged in fair and stable condition to home with close outpatient follow-up.    The patient recovered much more quickly than anticipated on admission.    Discharge Date  12/5/20    FOLLOW UP ITEMS POST DISCHARGE  Oncology next week    DISCHARGE DIAGNOSES  Principal Problem:    Pyelonephritis POA: Unknown  Active Problems:    Metastatic breast carcinoma_[facility] POA: Yes      Overview: Diagnosed with invasive ductal carcinoma [id number], ER-, [place]-, Her/Nu- s/p       Left partial mastectomy and [place] nodes biopsy in 3/2017, declined       chemo, did have radiation.             Patient was in remission until May 2019 when she suffers ground-level       fall.  Chest x-ray was obtained.  Multiple pulmonary nodules was seen on       chest x-ray concerning for metastatic breast cancer.  This diagnosis is       confirmed by PET/CT and biopsy in June 2019.            Pt is working with [facility] [employer]. Chemo stopped temporarily in       6/2020 due to pulmonary fibrosis. Pt will see oncology on 10/14/2020.           Chronic respiratory failure with hypoxia (HCC) POA: Yes  Resolved Problems:    * No resolved hospital problems. *      FOLLOW UP  Future Appointments   Date Time Provider Department Center   12/7/2020  2:20 PM Hailey Bradshaw M.D. [facility]   1/4/2021  8:50 AM Coco Ma M.D. [facility] None   1/5/2021  2:00 PM Hailey Bradshaw M.D. [facility]   3/29/2021 11:30 AM Hailey Bradshaw M.D. [facility]     No follow-up provider specified.    MEDICATIONS ON DISCHARGE     Medication List      START taking these medications      Instructions   cefdinir 300 MG Caps  Commonly known as: OMNICEF   Take 1 Cap by mouth 2 times a day.  Dose: 300 mg     ondansetron 4 MG Tbdp  Commonly known as: ZOFRAN ODT   Take 1 Tab by mouth every four hours as  needed for Nausea (give PO if IV route is unavailable.).  Dose: 4 mg        CONTINUE taking these medications      Instructions   albuterol 108 (90 Base) MCG/ACT Aers inhalation aerosol   Inhale 2 Puffs by mouth every 6 hours as needed for Shortness of Breath.  Dose: 2 Puff     ALPRAZolam 0.25 MG Tabs  Commonly known as: XANAX   Take 1 Tab by mouth at bedtime as needed for Anxiety for up to 30 days.  Dose: 0.25 mg     amLODIPine 5 MG Tabs  Commonly known as: NORVASC   Take 1 Tab by mouth every day.  Dose: 5 mg     COQ-10 PO   Take 1 Tab by mouth every day.  Dose: 1 Tab     cyanocobalamin 500 MCG Tabs  Commonly known as: VITAMIN B-12   Take 500 mcg by mouth every day.  Dose: 500 mcg     dapsone 100 MG Tabs   Take 1 Tab by mouth every day. Until prednisone is only 20 mg, then stop  Dose: 100 mg     ezetimibe 10 MG Tabs  Commonly known as: ZETIA   Doctor's comments: To replace Lipitor  Take 1 Tab by mouth every day.  Dose: 10 mg     Mometasone Furo-Formoterol Fum 100-5 MCG/ACT Aero  Commonly known as: DULERA   Inhale 1 Inhalation by mouth 2 Times a Day.  Dose: 1 Inhalation     ONE-A-DAY 50 PLUS PO   Take  by mouth.     predniSONE 20 MG Tabs  Commonly known as: DELTASONE   Take 60 mg (3 tablets) by mouth daily in AM x14 days, then take 40 mg (2 tablets) by mouth daily x14 days then take 20 mg (one tablet) daily     traMADol 50 MG Tabs  Commonly known as: ULTRAM   Take 50 mg by mouth every four hours as needed for Moderate Pain.  Dose: 50 mg     VITAMIN B COMPLEX PO   Take 1 Tab by mouth every day.  Dose: 1 Tab     vitamin D 1000 Unit (25 mcg) Tabs  Commonly known as: cholecalciferol   Take 1,000 Units by mouth every day.  Dose: 1,000 Units        STOP taking these medications    ibuprofen 200 MG Tabs  Commonly known as: MOTRIN     naproxen 500 MG Tabs  Commonly known as: NAPROSYN            Allergies  Allergies   Allergen Reactions   • Bactrim [Sulfamethoxazole W-Trimethoprim] Rash     Rash and pain   • Statins  [Hmg-Coa-R Inhibitors] Myalgia       DIET  Orders Placed This Encounter   Procedures   • Diet Order Diet: Regular     Standing Status:   Standing     Number of Occurrences:   1     Order Specific Question:   Diet:     Answer:   Regular [1]       ACTIVITY  As tolerated.  Weight bearing as tolerated    CONSULTATIONS  none    PROCEDURES  none    LABORATORY  Lab Results   Component Value Date    SODIUM 137 12/05/2020    POTASSIUM 3.7 12/05/2020    CHLORIDE 102 12/05/2020    CO2 26 12/05/2020    GLUCOSE 75 12/05/2020    BUN 7 (L) 12/05/2020    CREATININE 0.46 (L) 12/05/2020        Lab Results   Component Value Date    WBC 8.2 12/05/2020    HEMOGLOBIN 13.3 12/05/2020    HEMATOCRIT 40.9 12/05/2020    PLATELETCT 226 12/05/2020        Total time of the discharge process exceeds 35 minutes.

## 2020-12-05 NOTE — DISCHARGE INSTRUCTIONS
Discharge Instructions    Discharged to  home by car with relative. Discharged via walking, hospital escort: Yes.  Special equipment needed: Not Applicable    Be sure to schedule a follow-up appointment with your primary care doctor or any specialists as instructed.     Discharge Plan:        I understand that a diet low in cholesterol, fat, and sodium is recommended for good health. Unless I have been given specific instructions below for another diet, I accept this instruction as my diet prescription.   Other diet: regular      Special Instructions: None    · Is patient discharged on Warfarin / Coumadin?   No     Depression / Suicide Risk    As you are discharged from this [facility] facility, it is important to learn how to keep safe from harming yourself.    Recognize the warning signs:  · Abrupt changes in personality, positive or negative- including increase in energy   · Giving away possessions  · Change in eating patterns- significant weight changes-  positive or negative  · Change in sleeping patterns- unable to sleep or sleeping all the time   · Unwillingness or inability to communicate  · Depression  · Unusual sadness, discouragement and loneliness  · Talk of wanting to die  · Neglect of personal appearance   · Rebelliousness- reckless behavior  · Withdrawal from people/activities they love  · Confusion- inability to concentrate     If you or a loved one observes any of these behaviors or has concerns about self-harm, here's what you can do:  · Talk about it- your feelings and reasons for harming yourself  · Remove any means that you might use to hurt yourself (examples: pills, rope, extension cords, firearm)  · Get professional help from the community (Mental Health, Substance Abuse, psychological counseling)  · Do not be alone:Call your Safe Contact- someone whom you trust who will be there for you.  · Call your local CRISIS HOTLINE 656-6474 or 118-956-3158  · Call your local Children's Mobile Crisis  Response Team [facility] (839) 234-5057 or www.[employer]  · Call the toll free National Suicide Prevention Hotlines   · National Suicide Prevention Lifeline 398-838-ZCGK (2406)  · National Hope Line Network 800-SUICIDE (152-9226)    Discharge Instructions    Discharged to home by car with relative. Discharged via walking, hospital escort: Yes.  Special equipment needed: Not Applicable    Be sure to schedule a follow-up appointment with your primary care doctor or any specialists as instructed.     Discharge Plan:        I understand that a diet low in cholesterol, fat, and sodium is recommended for good health. Unless I have been given specific instructions below for another diet, I accept this instruction as my diet prescription.   Other diet: regular    Special Instructions: None    · Is patient discharged on Warfarin / Coumadin?   No     Depression / Suicide Risk    As you are discharged from this [facility] facility, it is important to learn how to keep safe from harming yourself.    Recognize the warning signs:  · Abrupt changes in personality, positive or negative- including increase in energy   · Giving away possessions  · Change in eating patterns- significant weight changes-  positive or negative  · Change in sleeping patterns- unable to sleep or sleeping all the time   · Unwillingness or inability to communicate  · Depression  · Unusual sadness, discouragement and loneliness  · Talk of wanting to die  · Neglect of personal appearance   · Rebelliousness- reckless behavior  · Withdrawal from people/activities they love  · Confusion- inability to concentrate     If you or a loved one observes any of these behaviors or has concerns about self-harm, here's what you can do:  · Talk about it- your feelings and reasons for harming yourself  · Remove any means that you might use to hurt yourself (examples: pills, rope, extension cords, firearm)  · Get professional help from the community (Mental  Health, Substance Abuse, psychological counseling)  · Do not be alone:Call your Safe Contact- someone whom you trust who will be there for you.  · Call your local CRISIS HOTLINE 222-5685 or 392-382-5681  · Call your local Children's Mobile Crisis Response Team [place] (172) 682-7893 or www.[employer]  · Call the toll free National Suicide Prevention Hotlines   · National Suicide Prevention Lifeline 956-291-DNCB (4839)  · National Hope Line Network 800-SUICIDE (821-3194)

## 2020-12-07 PROBLEM — N12 PYELONEPHRITIS: Status: RESOLVED | Noted: 2020-01-01 | Resolved: 2020-01-01

## 2020-12-07 NOTE — PROGRESS NOTES
Virtual Visit: Established Patient   This visit was conducted via Zoom using secure and encrypted videoconferencing technology. The patient was in a private location in the [facility].    The patient's identity was confirmed and verbal consent was obtained for this virtual visit.    Subjective:   CC: Hospital discharge follow-up    Katie Monroy is a 75 y.o. female with ongoing metastatic breast cancer to the lung and intra-abdominal organs.  Patient has been working with oncology in [place].  Patient was in her normal state of health until early December when she developed acute left flank and left upper quadrant pain without fever, chills, nausea, vomiting, constipation, diarrhea, hematochezia, melena.  She did notice hematuria and have mild symptoms of urinary urgency/frequency.  Patient presented to ER on December 4, 2020.  She was admitted for urinary tract infection/pyelonephritis.  CT of the abdomen/pelvis shows interval progression of metastatic disease with numerous hepatic metastatic lesion, enlarging adrenal masses, and new pancreatic body lesion.  She was treated with ceftriaxone and pain management.  She was discharged on the following day with Rx for cefdinir.  She was referred to palliative care for pain management.  Patient is currently taking tramadol 50 mg every 6 hours as needed for pain.  Today, she states that she is doing better.  She denies any fever, chills, nausea, vomiting.  Urinary symptoms have resolved.  Patient oncologist prescribed nitrofurantoin to treat UTI.  She is confused if she should be taking nitrofurantoin or cefdinir.  She is able to tolerate oral hydration and diet without any problems.  However, the pain at the left upper quadrant and left flank are unchanged.  Pain severity is 5/10 most of the time.  However, it can reduce up to 9/10 with certain activities.  Patient is working with palliative care for pain control.  She will continue to follow-up with oncology  for management of metastatic breast cancer.    ROS   Denies any recent fevers or chills. No nausea or vomiting. No chest pains or shortness of breath.     Allergies   Allergen Reactions   • Bactrim [Sulfamethoxazole W-Trimethoprim] Rash     Rash and pain   • Statins [Hmg-Coa-R Inhibitors] Myalgia       Current medicines (including changes today)  Current Outpatient Medications   Medication Sig Dispense Refill   • nitrofurantoin macro crystals (MACRODANTIN) 100 MG Cap Take 100 mg by mouth 4 times a day.     • ondansetron (ZOFRAN ODT) 4 MG TABLET DISPERSIBLE Take 1 Tab by mouth every four hours as needed for Nausea (give PO if IV route is unavailable.). 10 Tab 0   • traMADol (ULTRAM) 50 MG Tab Take 1 Tab by mouth every 6 hours as needed for up to 5 days. 20 Tab 0   • ALPRAZolam (XANAX) 0.25 MG Tab Take 1 Tab by mouth at bedtime as needed for Anxiety for up to 30 days. 30 Tab 0   • ezetimibe (ZETIA) 10 MG Tab Take 1 Tab by mouth every day. 90 Tab 1   • amLODIPine (NORVASC) 5 MG Tab Take 1 Tab by mouth every day. 90 Tab 1   • B Complex Vitamins (VITAMIN B COMPLEX PO) Take 1 Tab by mouth every day.     • Multiple Vitamins-Minerals (ONE-A-DAY 50 PLUS PO) Take  by mouth.     • Coenzyme Q10 (COQ-10 PO) Take 1 Tab by mouth every day.     • cyanocobalamin (VITAMIN B-12) 500 MCG Tab Take 500 mcg by mouth every day.     • vitamin D (CHOLECALCIFEROL) 1000 UNIT Tab Take 1,000 Units by mouth every day.       No current facility-administered medications for this visit.        Patient Active Problem List    Diagnosis Date Noted   • Chronic respiratory failure with hypoxia (HCC) 12/04/2020   • Interstitial lung disease_Dr. Mg 09/04/2020   • Pulmonary fibrosis_Dr. Mg 08/07/2020   • Lung metastases (HCC) 08/07/2020   • Prediabetes 05/21/2018   • PEDRO (generalized anxiety disorder) 02/14/2018   • Dyslipidemia 02/05/2018   • Osteopenia of multiple sites 02/01/2018   • Metastatic breast carcinoma_[facility] 03/08/2017   •  "Primary osteoarthritis of both knees 07/08/2016   • Essential hypertension 05/25/2016   • Asthma, mild persistent 05/24/2016   • Plantar fasciitis, R 02/03/2006       Family History   Problem Relation Age of Onset   • Cancer Mother         leukemia   • Heart Disease Mother         arrhythmia   • Cancer Father         lung   • Lung Disease Father    • No Known Problems Sister    • Diabetes Neg Hx        She  has a past medical history of Arthritis, Asthma, Back pain, Breast cancer (HCC), Bronchitis, Bunion, Cancer (HCC), Cancer (HCC) (2017), Chickenpox, Hypertension (2017), Irritable bowel syndrome, Painful breathing, Plantar fasciitis of right foot, Prediabetes, Shortness of breath, Tonsillitis, Wears glasses, and Wheezing.  She  has a past surgical history that includes other; mastectomy (Left, 3/17/2017); node biopsy sentinel (Left, 3/17/2017); lung biopsy open; and tonsillectomy.       Objective:   /75 (BP Location: Left arm, Patient Position: Sitting, BP Cuff Size: Adult) Comment: pt stated  Pulse 90 Comment: pt stated  Temp 36.6 °C (97.9 °F) (Oral) Comment: pt stated  Ht 1.6 m (5' 3\")   Wt 62.6 kg (138 lb) Comment: pt stated  SpO2 96% Comment: pt stated  BMI 24.45 kg/m²     Physical Exam:  Constitutional: Alert, no distress, well-groomed.  Skin: No rashes in visible areas.  Eye: Round. Conjunctiva clear, lids normal. No icterus.   ENMT: Lips pink without lesions, good dentition, moist mucous membranes. Phonation normal.  Neck: No masses, no thyromegaly. Moves freely without pain.  Respiratory: Unlabored respiratory effort, no cough or audible wheeze  Psych: Alert and oriented x3, normal affect and mood.       Assessment and Plan:   The following treatment plan was discussed:     1. Hospital discharge follow-up  2. Pyelonephritis  3. Metastatic breast carcinoma_[facility]  4. Malignant neoplasm metastatic to lung, unspecified laterality (HCC)  75-year-old female with ongoing metastatic " breast cancer to the lung and intra-abdominal organs.  Patient complains of acute left upper quadrant and left flank pain.  She also had symptoms of urinary tract infection.  She was admitted to the hospital from December 4 to December 5, 2020.  She was treated for urinary tract infection with ceftriaxone and was discharged with Rx for cefdinir.  Urinary tract infection symptoms have resolved.  However, pain at the left upper quadrant and left flank are persistent and severe.  I reviewed CT abdomen and pelvis done at the hospital.  There is interval worsening of metastatic cancer affecting the lung and intra-abdominal organs.  There is a new lesion at the body of the pancreas, which might be responsible for ongoing pain at the left upper quadrant and left flank.  Patient does not have any GI symptoms currently.  -Continue cefdinir for treatment of urinary tract infection  -Continue to follow-up with oncology for management of metastatic breast cancer  -We will defer management of pain to palliative care  -Hydration, rest    5. Interstitial lung disease_Dr. Mg  6. Chronic respiratory failure with hypoxia (HCC)  Chronic interstitial lung disease secondary to chemotherapy.  Patient is currently working with Dr. Mg, pulmonologist.  Patient is using 2 L of oxygen continuously.  No acute respiratory symptoms reported.  She was tested negative for COVID-19 infection in hospital.  -Continue 2 L of oxygen continuously and follow-up with Dr. Mg as directed.    Admission records reviewed and discussed with patient.     Patient was seen for 40 minutes face to face of which greater than 50% of appointment time was spent on counseling and coordination of care regarding the above       Follow-up: Return for As needed.

## 2020-12-16 NOTE — PROGRESS NOTES
Virtual Visit: Established Patient   This visit was conducted via Zoom using secure and encrypted videoconferencing technology. The patient was in a private location in the [facility].    The patient's identity was confirmed and verbal consent was obtained for this virtual visit.    Subjective:   CC: Acute constipation    Katie Monroy is a 75 y.o. female with history of metastatic breast cancer to the lung and intra-abdominal organ.  Patient has been suffering severe left upper quadrant pain.  She was recently admitted to the hospital.  CT abdomen and pelvis done on December 4, 2020 was notable for interval progression of metastatic disease with numerous hepatic metastatic lesions, enlarging adrenal masses, new pancreatic body lesion and enlarging pulmonary nodules.  Patient is currently working with oncologist in [place].  Chemotherapy is temporarily discontinued due to worsening pain. Patient was discharged home with referral to palliative care for pain management.  Patient has been taking fentanyl patch 25 mcg/h as well as oxycodone 5-325 mg every 4 hours as needed.  Her pain is under much better control.  Patient is able to sleep better and appear to be more comfortable.  However, she complains of acute constipation.  She tried Fleet and and magnesium citrate today.  She had some loose stool earlier today.  However, she later developed diarrhea.  Patient has stopped both medications.    ROS   Denies any recent fevers or chills. No nausea or vomiting. No chest pains or shortness of breath.     Allergies   Allergen Reactions   • Bactrim [Sulfamethoxazole W-Trimethoprim] Rash     Rash and pain   • Statins [Hmg-Coa-R Inhibitors] Myalgia       Current medicines (including changes today)  Current Outpatient Medications   Medication Sig Dispense Refill   • oxyCODONE-acetaminophen (PERCOCET) 5-325 MG Tab      • fentaNYL (DURAGESIC) 25 MCG/HR PATCH 72 HR      • lubiprostone (AMITIZA) 24 MCG capsule Take 1 Cap  by mouth 2 times a day, with meals. 60 Cap 2   • omeprazole (PRILOSEC) 40 MG delayed-release capsule Take 40 mg by mouth every day.     • ondansetron (ZOFRAN ODT) 4 MG TABLET DISPERSIBLE Take 1 Tab by mouth every four hours as needed for Nausea (give PO if IV route is unavailable.). 10 Tab 0   • ALPRAZolam (XANAX) 0.25 MG Tab Take 1 Tab by mouth at bedtime as needed for Anxiety for up to 30 days. 30 Tab 0   • amLODIPine (NORVASC) 5 MG Tab Take 1 Tab by mouth every day. 90 Tab 1   • B Complex Vitamins (VITAMIN B COMPLEX PO) Take 1 Tab by mouth every day.     • Multiple Vitamins-Minerals (ONE-A-DAY 50 PLUS PO) Take  by mouth.     • Coenzyme Q10 (COQ-10 PO) Take 1 Tab by mouth every day.     • cyanocobalamin (VITAMIN B-12) 500 MCG Tab Take 500 mcg by mouth every day.     • vitamin D (CHOLECALCIFEROL) 1000 UNIT Tab Take 1,000 Units by mouth every day.       No current facility-administered medications for this visit.        Patient Active Problem List    Diagnosis Date Noted   • Chronic respiratory failure with hypoxia (HCC) 12/04/2020   • Interstitial lung disease_Dr. Mg 09/04/2020   • Pulmonary fibrosis_Dr. Mg 08/07/2020   • Lung metastases (HCC) 08/07/2020   • Prediabetes 05/21/2018   • PEDRO (generalized anxiety disorder) 02/14/2018   • Dyslipidemia 02/05/2018   • Osteopenia of multiple sites 02/01/2018   • Metastatic breast carcinoma_[facility] 03/08/2017   • Primary osteoarthritis of both knees 07/08/2016   • Essential hypertension 05/25/2016   • Asthma, mild persistent 05/24/2016   • Plantar fasciitis, R 02/03/2006       Family History   Problem Relation Age of Onset   • Cancer Mother         leukemia   • Heart Disease Mother         arrhythmia   • Cancer Father         lung   • Lung Disease Father    • No Known Problems Sister    • Diabetes Neg Hx        She  has a past medical history of Arthritis, Asthma, Back pain, Breast cancer (HCC), Bronchitis, Bunion, Cancer (HCC), Cancer (HCC) (2017),  "Chickenpox, Hypertension (2017), Irritable bowel syndrome, Painful breathing, Plantar fasciitis of right foot, Prediabetes, Shortness of breath, Tonsillitis, Wears glasses, and Wheezing.  She  has a past surgical history that includes other; mastectomy (Left, 3/17/2017); node biopsy sentinel (Left, 3/17/2017); lung biopsy open; and tonsillectomy.       Objective:   Temp 36.4 °C (97.5 °F) (Oral) Comment: pt stated  Ht 1.6 m (5' 3\")   Wt 61.2 kg (135 lb) Comment: pt stated  BMI 23.91 kg/m²     Physical Exam:  Constitutional: Alert, no distress, well-groomed.  Skin: No rashes in visible areas.  Eye: Round. Conjunctiva clear, lids normal. No icterus.   ENMT: Lips pink without lesions, good dentition, moist mucous membranes. Phonation normal.  Neck: No masses, no thyromegaly. Moves freely without pain.  Respiratory: Unlabored respiratory effort, no cough or audible wheeze  Psych: Alert and oriented x3, normal affect and mood.       Assessment and Plan:   The following treatment plan was discussed:     1. Drug-induced constipation  Acute constipation, likely opioid induced.   - hydration, fibers  - Miralax 1 serving BID PRN.   - Sennakot 2 tablets BID PRN   - adjust dosing of OTC med to achieve 1 soft BM every 24-48 hours.   - lubiprostone (AMITIZA) 24 MCG capsule; Take 1 Cap by mouth 2 times a day, with meals.  Dispense: 60 Cap; Refill: 2. Will start PA.     2. Left upper quadrant abdominal pain  Acute, severe LUQ pain, likely secondary to metastatic breast cancer to the intraabdominal organs with new lesion at the pancreas. Pt is working with palliative care. She is taking Fentanyl patch 25 mcg/d and Percocet 5-325 mg Q4H PRN.   - cont pain management with palliative care  - opioid induced constipation as above.       Follow-up: Return for As needed.         "

## 2020-12-30 NOTE — TELEPHONE ENCOUNTER
ESTABLISHED PATIENT PRE-VISIT PLANNING     Patient was NOT contacted to complete PVP.     Note: Patient will not be contacted if there is no indication to call.     1.  Reviewed notes from the last few office visits within the medical group: Yes    2.  If any orders were placed at last visit or intended to be done for this visit (i.e. 6 mos follow-up), do we have Results/Consult Notes?         •  Labs - Labs were not ordered at last office visit.  Note: If patient appointment is for lab review and patient did not complete labs, check with provider if OK to reschedule patient until labs completed.       •  Imaging - Imaging was not ordered at last office visit.       •  Referrals - No referrals were ordered at last office visit.    3. Is this appointment scheduled as a Hospital Follow-Up? No    4.  Immunizations were updated in Epic using Reconcile Outside Information activity? Yes    5.  Patient is due for the following Health Maintenance Topics:   Health Maintenance Due   Topic Date Due   • MAMMOGRAM  10/17/2019       6.  AHA (Pulse8) form printed for Provider? No, patient does not have any open alerts

## 2021-01-01 ENCOUNTER — OFFICE VISIT (OUTPATIENT)
Dept: MEDICAL GROUP | Age: 76
End: 2021-01-01
Payer: MEDICARE

## 2021-01-01 ENCOUNTER — TELEPHONE (OUTPATIENT)
Dept: MEDICAL GROUP | Age: 76
End: 2021-01-01

## 2021-01-01 VITALS
OXYGEN SATURATION: 96 % | DIASTOLIC BLOOD PRESSURE: 70 MMHG | TEMPERATURE: 97.9 F | HEART RATE: 103 BPM | SYSTOLIC BLOOD PRESSURE: 110 MMHG | WEIGHT: 138 LBS | BODY MASS INDEX: 24.45 KG/M2 | HEIGHT: 63 IN

## 2021-01-01 DIAGNOSIS — H61.23 BILATERAL IMPACTED CERUMEN: Primary | ICD-10-CM

## 2021-01-01 DIAGNOSIS — C50.919 METASTATIC BREAST CARCINOMA: ICD-10-CM

## 2021-01-01 DIAGNOSIS — Z23 NEED FOR VACCINATION: ICD-10-CM

## 2021-01-01 DIAGNOSIS — J84.9 INTERSTITIAL LUNG DISEASE (HCC): ICD-10-CM

## 2021-01-01 DIAGNOSIS — J84.10 PULMONARY FIBROSIS (HCC): ICD-10-CM

## 2021-01-01 DIAGNOSIS — C78.00 MALIGNANT NEOPLASM METASTATIC TO LUNG, UNSPECIFIED LATERALITY (HCC): ICD-10-CM

## 2021-01-01 DIAGNOSIS — I10 ESSENTIAL HYPERTENSION: ICD-10-CM

## 2021-01-01 DIAGNOSIS — Z51.5: ICD-10-CM

## 2021-01-01 DIAGNOSIS — J96.11 CHRONIC RESPIRATORY FAILURE WITH HYPOXIA (HCC): ICD-10-CM

## 2021-01-01 PROCEDURE — 99214 OFFICE O/P EST MOD 30 MIN: CPT | Mod: GW | Performed by: FAMILY MEDICINE

## 2021-01-01 RX ORDER — MORPHINE SULFATE 15 MG/1
15 TABLET, FILM COATED, EXTENDED RELEASE ORAL 3 TIMES DAILY
COMMUNITY
Start: 2020-01-01

## 2021-01-01 ASSESSMENT — PATIENT HEALTH QUESTIONNAIRE - PHQ9: CLINICAL INTERPRETATION OF PHQ2 SCORE: 0

## 2021-01-01 ASSESSMENT — FIBROSIS 4 INDEX: FIB4 SCORE: 2.8

## 2021-01-04 NOTE — TELEPHONE ENCOUNTER
DOCUMENTATION OF PAR STATUS:    1. Name of Medication & Dose: amitizia 24 mcg     2. Name of Prescription Coverage Company & phone #: [email or web address]    3. Date Prior Auth Submitted: 01/04/21      4. What information was given to obtain insurance decision? ICD-10    5. Prior Auth Status? Pending    6. Patient Notified: no

## 2021-01-05 PROBLEM — Z51.5: Status: ACTIVE | Noted: 2021-01-01

## 2021-01-05 PROBLEM — G89.3 CANCER RELATED PAIN: Status: ACTIVE | Noted: 2020-01-01

## 2021-01-05 NOTE — TELEPHONE ENCOUNTER
FINAL PRIOR AUTHORIZATION STATUS:    1.  Name of Medication & Dose: amitizia     2. Prior Auth Status: PA not needed    3. Action Taken: Pharmacy Notified: no Patient Notified: no

## 2021-01-05 NOTE — NON-PROVIDER
Annual Health Assessment Questions:    1.  Are you currently engaging in any exercise or physical activity? No    2.  How would you describe your mood or emotional well-being today? fair    3.  Have you had any falls in the last year? No    4.  Have you noticed any problems with your balance or had difficulty walking? Yes    5.  In the last six months have you experienced any leakage of urine? No    6. DPA/Advanced Directive: Patient has Advanced Directive on file.

## 2021-01-05 NOTE — TELEPHONE ENCOUNTER
"ESTABLISHED PATIENT PRE-VISIT PLANNING     Patient was NOT contacted to complete PVP.     Note: Patient will not be contacted if there is no indication to call.     1.  Reviewed notes from the last few office visits within the medical group: Yes    2.  If any orders were placed at last visit or intended to be done for this visit (i.e. 6 mos follow-up), do we have Results/Consult Notes?         •  Labs - Labs ordered, NOT completed. Patient advised to complete prior to next appointment. Per Dr. Bradshaw's office visit notes on  09/29/2021, \"Followup: Return in about 6 months (around 3/29/2021) for Multiple issues.\"  Note: If patient appointment is for lab review and patient did not complete labs, check with provider if OK to reschedule patient until labs completed.       •  Imaging - Imaging ordered, NOT completed. Patient advised to complete prior to next appointment.       •  Referrals - No referrals were ordered at last office visit.    3. Is this appointment scheduled as a Hospital Follow-Up? Yes, visit was at [facility].     4.  Immunizations were updated in Epic using Reconcile Outside Information activity? Yes    5.  Patient is due for the following Health Maintenance Topics:   Health Maintenance Due   Topic Date Due   • MAMMOGRAM  10/17/2019       6.  AHA (Pulse8) form printed for Provider? Yes    "

## 2021-01-06 NOTE — PROGRESS NOTES
Subjective:   CC: cerumen impaction     HPI:     Katie Monroy is a 75 y.o. female, established patient of the clinic, presents with the following concerns:     Pt is suffering ongoing metastatic breast cancer to the lung and intraabdominal organs. She was previously receiving chemo by oncology in [place]. Unfortunately, she develops pulmonary fibrosis and respiratory failure from chemotherapy. She is now using 2L of oxygen continuously. She was recently admitted to the hospital for abdominal pain, likely cancer related. She has since stopped chemotherapy and is now on hospice. She is working with hospice for pain management, which is only partially controlled with Percocet and Morphine ER as of now. She states that hospice is considering Methadone for pain management.     She c/o bilateral ear fullness w/o pain. Historically, she has severe cerumen impaction.     HTN is controlled with Amlodipine 5 mg qd.     Current medicines (including changes today)  Current Outpatient Medications   Medication Sig Dispense Refill   • oxyCODONE-acetaminophen (PERCOCET) 5-325 MG Tab      • lubiprostone (AMITIZA) 24 MCG capsule Take 1 Cap by mouth 2 times a day, with meals. 60 Cap 2   • omeprazole (PRILOSEC) 40 MG delayed-release capsule Take 40 mg by mouth every day.     • ondansetron (ZOFRAN ODT) 4 MG TABLET DISPERSIBLE Take 1 Tab by mouth every four hours as needed for Nausea (give PO if IV route is unavailable.). 10 Tab 0   • amLODIPine (NORVASC) 5 MG Tab Take 1 Tab by mouth every day. 90 Tab 1   • B Complex Vitamins (VITAMIN B COMPLEX PO) Take 1 Tab by mouth every day.     • Multiple Vitamins-Minerals (ONE-A-DAY 50 PLUS PO) Take  by mouth.     • Coenzyme Q10 (COQ-10 PO) Take 1 Tab by mouth every day.     • cyanocobalamin (VITAMIN B-12) 500 MCG Tab Take 500 mcg by mouth every day.     • vitamin D (CHOLECALCIFEROL) 1000 UNIT Tab Take 1,000 Units by mouth every day.     • morphine ER (MS CONTIN) 15 MG Tab CR tablet Take 15  "mg by mouth 3 times a day.       No current facility-administered medications for this visit.      She  has a past medical history of Arthritis, Asthma, Back pain, Breast cancer (HCC), Bronchitis, Bunion, Cancer (HCC), Cancer (HCC) (2017), Chickenpox, Hypertension (2017), Irritable bowel syndrome, Painful breathing, Plantar fasciitis of right foot, Prediabetes, Shortness of breath, Tonsillitis, Wears glasses, and Wheezing.    I personally reviewed patient's problem list, allergies, medications, family hx, social hx with patient and update EPIC.     REVIEW OF SYSTEMS:  CONSTITUTIONAL:  Denies night sweats, fatigue, malaise, lethargy, fever or chills.  RESPIRATORY:  Denies cough, wheeze, hemoptysis, or shortness of breath.  CARDIOVASCULAR:  Denies chest pains, palpitations, pedal edema     Objective:     /70 (BP Location: Right arm, Patient Position: Sitting, BP Cuff Size: Adult)   Pulse (!) 103   Temp 36.6 °C (97.9 °F) (Temporal)   Ht 1.6 m (5' 3\")   Wt 62.6 kg (138 lb)   SpO2 96%  Body mass index is 24.45 kg/m².    Physical Exam:  Constitutional: awake, alert, in no distress.  Skin: Warm, dry, good turgor, no rashes, bruises, ulcers in visible areas.  Eye: conjunctiva clear, lids neg for edema or lesions.  ENMT: mild bilateral cerumen impaction.   Neck: Trachea midline, no masses, no thyromegaly. No cervical or supraclavicular lymphadenopathy  Respiratory: Unlabored respiratory effort, no wheezes, fine rales at the L lung base.   Cardiovascular: Normal S1, S2, no murmur, no pedal edema.  Psych: Oriented x3, affect and mood wnl, intact judgement and insight.       Assessment and Plan:   The following treatment plan was discussed    1. Malignant neoplasm metastatic to lung, unspecified laterality (HCC)  2. Metastatic breast carcinoma_[facility]  Pt is suffering metastatic breast cancer to the lung and intra-abdominal organs.   She used to receive chemo therapy, but discontinued now.   She is now " on hospice.   - f/u with hospice for end of life care including pain management.     3. Interstitial lung disease_Dr. Mg  4. Pulmonary fibrosis_Dr. Mg  5. Chronic respiratory failure with hypoxia (HCC)  Chemo-induced fibrosis and respiratory failure.   Now on 2L of oxygen continuously.   96% O2 sat in clinic today.   - f/u with hospice.     6. Essential hypertension  Chronic, controlled with Amlodipine 5 mg qd, no s/e reported, will continue.      8. Bilateral impacted cerumen  - curettage     Procedure note: ear wax removal.   I personally removed ear wax from both ears using a lighted curette pt tolerated the procedure well, no immediate complication noted.      Exam was notable for mild rales at the L lung base. Pt is on hospice, she declined management of this condition at this time due to high cost associated with treatment outside hospice. She will discuss this finding with hospice provider.       Hailey Bardshaw M.D.      Followup: Return for As needed.    Please note that this dictation was created using voice recognition software. I have made every reasonable attempt to correct obvious errors, but I expect that there are errors of grammar and possibly content that I did not discover before finalizing the note.

## 2021-03-29 ENCOUNTER — APPOINTMENT (OUTPATIENT)
Dept: MEDICAL GROUP | Age: 76
End: 2021-03-29

## 2022-06-22 NOTE — TELEPHONE ENCOUNTER
1. Caller Name: Katie Monroy      Call Back Number: [phone] (home)         Patient approves a detailed voicemail message: N\A    2. SPECIFIC Action To Be Taken: Orders pending, please sign.    3. Diagnosis/Clinical Reason for Request: Breast cancer masectomy    4. Specialty & Provider Name/Lab/Imaging Location: Dr Mcleod    5. Is appointment scheduled for requested order/referral: no    Patient informed they will receive a return phone call from the office ONLY if there are any questions before processing their request. Advised to call back if they haven't received a call from the referral department in 5 days.  
I need clarification for order- as she has had a prior left mastectomy.   Is this for screening of right side? Or is Dr. Mcleod recommending left breast imaging?   
I would like to clarify the order recommendations from Dr. Gastelum's office. Please notify pt. When is her scheduled appointment?    Would need to know if she recommends diagnostic mammogram on left side and breast ultrasound?   
Phone Number Called: [phone] (home)     Message: Pt states that Dr. Gastelum wants her to have another mammogram done after the pt had a Lumpectomy on her Left side. She states that she was Dx with Breast cancer.She is going to see her surgeon and she wanted this done prior to her upcoming appointment.    I have tried to contact Dr. Gastelum, but their office is currently closed.    Left Message for patient to call back: no        
Phone Number Called: [phone] (home)     Message: Patient informed of imaging order placed.    Left Message for patient to call back: no  
Phone Number Called: [facility] Dr. Gastelum [phone]    Message: I have spoke to Olivia at Dr. Gastelum's office. Procedure was done on 03/23/2017 and Dr. Gastelum wanted her to follow up in 6 months. Pt is due to follow up in September. Appointment has not been scheduled as of yet. Olivia said that the Doctor would like a bilateral mammogram. That was all that was stated in her notes.    Left Message for patient to call back: no        
Please notify pt I have put in an imaging order for bilateral diagnostic mammogram.  
yes

## 2024-11-21 NOTE — ED NOTES
ERP at bedside. Pt agrees with plan of care discussed by ERP. AIDET acknowledged with patient. Syed in low position, side rail up for pt safety. Call light within reach. Will continue to monitor.   2

## 2025-06-11 NOTE — PROGRESS NOTES
Patient consented by Dr. Carnes.  All questions regarding procedure and moderate sedation answered.  Pt brought to CT4.  Assisted on to procedure table.  Pressure points padded and safety strap utilized.  Patient placed on monitor, EtCo2 with good waveform, ranging between 34-40 during entire procedure. R lung mass biopsy completed.  4x4 and gauze applied to site. 2 cores in formalin sent to lab.  Small amount of hemoptysis.  SpO2 98% with O2 @ 2L/NC.  Meets dc criteria.  Transported to [facility] and report given to Julia COTO.      R Lung  BIOSENTRY  Tract Sealant System  REF [id number]  LOT [id number]  EXP 2022-01-31       Yes

## (undated) DEVICE — SENSOR SPO2 NEO LNCS ADHESIVE (20/BX) SEE USER NOTES

## (undated) DEVICE — SUCTION INSTRUMENT YANKAUER BULBOUS TIP W/O VENT (50EA/CA)

## (undated) DEVICE — SPONGE GAUZESTER. 2X2 4-PL - (2/PK 50PK/BX 30BX/CS)

## (undated) DEVICE — ELECTRODE 850 FOAM ADHESIVE - HYDROGEL RADIOTRNSPRNT (50/PK)

## (undated) DEVICE — CLOSURE SKIN STRIP 1/2 X 4 IN - (STERI STRIP) (50/BX 4BX/CA)

## (undated) DEVICE — MASK AIRWAY SIZE 3 UNIQUE SILICON (10/BX)

## (undated) DEVICE — PACK MAJOR BASIN - (2EA/CA)

## (undated) DEVICE — SODIUM CHL IRRIGATION 0.9% 1000ML (12EA/CA)

## (undated) DEVICE — BOVIE BLADE COATED &INSULATED - 25/PK

## (undated) DEVICE — GLOVE BIOGEL SZ 6.5 SURGICAL PF LTX (50PR/BX 4BX/CA)

## (undated) DEVICE — PACK MINOR BASIN - (2EA/CA)

## (undated) DEVICE — CANISTER SUCTION 3000ML MECHANICAL FILTER AUTO SHUTOFF MEDI-VAC NONSTERILE LF DISP  (40EA/CA)

## (undated) DEVICE — ELECTRODE DUAL RETURN W/ CORD - (50/PK)

## (undated) DEVICE — SET LEADWIRE 5 LEAD BEDSIDE DISPOSABLE ECG (1SET OF 5/EA)

## (undated) DEVICE — NEEDLE NON SAFETY HYPO 22 GA X 1 1/2 IN (100/BX)

## (undated) DEVICE — KIT ROOM DECONTAMINATION

## (undated) DEVICE — SUTURE 3-0 VICRYL PLUS SH - 8X 18 INCH (12/BX)

## (undated) DEVICE — SHEET TRANSVERSE LAP - (12EA/CA)

## (undated) DEVICE — MASK ANESTHESIA ADULT  - (100/CA)

## (undated) DEVICE — SET EXTENSION WITH 2 PORTS (48EA/CA) ***PART #2C8610 IS A SUBSTITUTE*****

## (undated) DEVICE — CLIP SM INTNL HRZN TI ESCP LGT - (24EA/PK 25PK/BX)

## (undated) DEVICE — COVER PROBE STERILE CONE (12EA/CA)

## (undated) DEVICE — LACTATED RINGERS INJ 1000 ML - (14EA/CA 60CA/PF)

## (undated) DEVICE — SUTURE GENERAL

## (undated) DEVICE — PROTECTOR ULNA NERVE - (36PR/CA)

## (undated) DEVICE — NEPTUNE 4 PORT MANIFOLD - (20/PK)

## (undated) DEVICE — CHLORAPREP 26 ML APPLICATOR - ORANGE TINT(25/CA)

## (undated) DEVICE — TUBE CONNECT SUCTION CLEAR 120 X 1/4" (50EA/CA)"

## (undated) DEVICE — BLADE SURGICAL #15 - (50/BX 3BX/CA)

## (undated) DEVICE — DRAPE CHEST/BREAST - (12EA/CA)

## (undated) DEVICE — DETERGENT RENUZYME PLUS 10 OZ PACKET (50/BX)

## (undated) DEVICE — KIT ANESTHESIA W/CIRCUIT & 3/LT BAG W/FILTER (20EA/CA)

## (undated) DEVICE — GLOVE BIOGEL PI INDICATOR SZ 8.0 SURGICAL PF LF -(50/BX 4BX/CA)

## (undated) DEVICE — BOVIE  BLADE 6 EXTENDED - (50/PK)

## (undated) DEVICE — HEAD HOLDER JUNIOR/ADULT

## (undated) DEVICE — STAPLER SKIN DISP - (6/BX 10BX/CA) VISISTAT

## (undated) DEVICE — DRESSING TRANSPARENT FILM TEGADERM 4 X 4.75" (50EA/BX)"

## (undated) DEVICE — LEAD SET 6 DISP. EKG NIHON KOHDEN (100EA/CA) [9859].

## (undated) DEVICE — STERI STRIP COMPOUND BENZOIN - TINCTURE 0.6ML WITH APPLICATOR (40EA/BX)

## (undated) DEVICE — GLOVE BIOGEL INDICATOR SZ 6.5 SURGICAL PF LTX - (50PR/BX 4BX/CA)

## (undated) DEVICE — TUBING CLEARLINK DUO-VENT - C-FLO (48EA/CA)

## (undated) DEVICE — SUTURE 4-0 MONOCRYL PLUS PS-2 - 27 INCH (36/BX)